# Patient Record
Sex: FEMALE | Race: WHITE | NOT HISPANIC OR LATINO | Employment: OTHER | URBAN - METROPOLITAN AREA
[De-identification: names, ages, dates, MRNs, and addresses within clinical notes are randomized per-mention and may not be internally consistent; named-entity substitution may affect disease eponyms.]

---

## 2017-02-21 ENCOUNTER — APPOINTMENT (EMERGENCY)
Dept: CT IMAGING | Facility: HOSPITAL | Age: 82
End: 2017-02-21
Payer: MEDICARE

## 2017-02-21 ENCOUNTER — APPOINTMENT (EMERGENCY)
Dept: RADIOLOGY | Facility: HOSPITAL | Age: 82
End: 2017-02-21
Payer: MEDICARE

## 2017-02-21 ENCOUNTER — HOSPITAL ENCOUNTER (EMERGENCY)
Facility: HOSPITAL | Age: 82
Discharge: HOME | End: 2017-02-21
Attending: EMERGENCY MEDICINE | Admitting: EMERGENCY MEDICINE
Payer: MEDICARE

## 2017-02-21 VITALS
WEIGHT: 135.14 LBS | HEIGHT: 64 IN | BODY MASS INDEX: 23.07 KG/M2 | HEART RATE: 89 BPM | TEMPERATURE: 98 F | SYSTOLIC BLOOD PRESSURE: 179 MMHG | OXYGEN SATURATION: 97 % | RESPIRATION RATE: 16 BRPM | DIASTOLIC BLOOD PRESSURE: 83 MMHG

## 2017-02-21 DIAGNOSIS — M16.12 ARTHRITIS OF LEFT HIP: ICD-10-CM
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
## 2017-02-21 DIAGNOSIS — S81.812A: ICD-10-CM

## 2017-02-21 DIAGNOSIS — S41.112A SKIN TEAR OF LEFT UPPER EXTREMITY: Primary | ICD-10-CM

## 2017-02-21 PROCEDURE — 73590 X-RAY EXAM OF LOWER LEG: CPT

## 2017-02-21 PROCEDURE — 90715 TDAP VACCINE 7 YRS/> IM: CPT | Performed by: EMERGENCY MEDICINE

## 2017-02-21 PROCEDURE — 73521 X-RAY EXAM HIPS BI 2 VIEWS: CPT

## 2017-02-21 PROCEDURE — 70450 CT HEAD/BRAIN W/O DYE: CPT

## 2017-02-21 PROCEDURE — 99284 EMERGENCY DEPT VISIT MOD MDM: CPT

## 2017-02-21 PROCEDURE — 90471 IMMUNIZATION ADMIN: CPT

## 2017-02-21 PROCEDURE — 73080 X-RAY EXAM OF ELBOW: CPT

## 2017-02-21 RX ORDER — ACETAMINOPHEN 325 MG/1
650 TABLET ORAL ONCE
Status: COMPLETED | OUTPATIENT
Start: 2017-02-21 | End: 2017-02-21

## 2017-02-21 RX ORDER — VERAPAMIL HYDROCHLORIDE 180 MG/1
180 CAPSULE, EXTENDED RELEASE ORAL DAILY
COMMUNITY
End: 2019-10-21 | Stop reason: SDUPTHER

## 2017-02-21 RX ORDER — LISINOPRIL 20 MG/1
20 TABLET ORAL 2 TIMES DAILY
COMMUNITY
End: 2019-08-13 | Stop reason: SDUPTHER

## 2017-02-21 RX ORDER — MULTIVIT-MIN/IRON/FOLIC ACID/K 18-600-40
2000 CAPSULE ORAL DAILY
COMMUNITY
End: 2018-04-26

## 2017-02-21 RX ORDER — FOLIC ACID 1 MG/1
1 TABLET ORAL DAILY
COMMUNITY
End: 2019-09-05 | Stop reason: SDUPTHER

## 2017-02-21 RX ORDER — AMLODIPINE BESYLATE 5 MG/1
5 TABLET ORAL DAILY
COMMUNITY
End: 2020-03-20 | Stop reason: SDUPTHER

## 2017-02-21 RX ORDER — CLOPIDOGREL BISULFATE 75 MG/1
75 TABLET ORAL DAILY
COMMUNITY
End: 2020-02-19 | Stop reason: SDUPTHER

## 2017-02-21 RX ADMIN — ACETAMINOPHEN 650 MG: 325 TABLET ORAL at 08:39

## 2017-02-21 RX ADMIN — Medication 1 APPLICATION: at 07:59

## 2017-02-21 RX ADMIN — TETANUS TOXOID, REDUCED DIPHTHERIA TOXOID AND ACELLULAR PERTUSSIS VACCINE, ADSORBED 0.5 ML: 5; 2.5; 8; 8; 2.5 SUSPENSION INTRAMUSCULAR at 08:46

## 2017-08-02 LAB — HBA1C MFR BLD HPLC: 5.8 %

## 2017-12-05 LAB — HBA1C MFR BLD HPLC: 5.6 %

## 2018-04-26 ENCOUNTER — OFFICE VISIT (OUTPATIENT)
Dept: URGENT CARE | Age: 83
End: 2018-04-26
Payer: MEDICARE

## 2018-04-26 VITALS
OXYGEN SATURATION: 97 % | DIASTOLIC BLOOD PRESSURE: 77 MMHG | HEART RATE: 99 BPM | SYSTOLIC BLOOD PRESSURE: 167 MMHG | BODY MASS INDEX: 24.73 KG/M2 | TEMPERATURE: 98.9 F | WEIGHT: 131 LBS | HEIGHT: 61 IN | RESPIRATION RATE: 20 BRPM
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
## 2018-04-26 DIAGNOSIS — S61.411A LACERATION OF RIGHT HAND WITH INFECTION, INITIAL ENCOUNTER: ICD-10-CM

## 2018-04-26 DIAGNOSIS — L08.9 LACERATION OF RIGHT HAND WITH INFECTION, INITIAL ENCOUNTER: ICD-10-CM

## 2018-04-26 PROCEDURE — G0463 HOSPITAL OUTPT CLINIC VISIT: HCPCS | Performed by: FAMILY MEDICINE

## 2018-04-26 PROCEDURE — 99213 OFFICE O/P EST LOW 20 MIN: CPT | Performed by: FAMILY MEDICINE

## 2018-04-26 RX ORDER — CEPHALEXIN 500 MG/1
500 CAPSULE ORAL EVERY 6 HOURS SCHEDULED
Qty: 28 CAPSULE | Refills: 0 | Status: SHIPPED | OUTPATIENT
Start: 2018-04-26 | End: 2018-05-03

## 2018-04-26 RX ORDER — GINSENG 100 MG
1 CAPSULE ORAL ONCE
Status: COMPLETED | OUTPATIENT
Start: 2018-04-26 | End: 2018-04-26

## 2018-04-26 RX ADMIN — Medication 1 SMALL APPLICATION: at 10:24

## 2018-04-26 NOTE — PROGRESS NOTES
[address] [facility]        NAME: Yarely Saucedo is a 80 y o  female  : 1918    MRN: [id number]  DATE: 2018  TIME: 8:33 AM    Assessment and Plan   Dog scratch [Q58  8XXA]  1  Dog scratch     2  Laceration of right hand with infection, initial encounter  cephalexin (KEFLEX) 500 mg capsule         Patient Instructions   Prescription sent to pharmacy for cephalexin antibiotic-use as directed  Apply bacitracin ointment to-3 times daily  Keep wound clean and dry  Closely monitor for signs of worsening infection  I educated the patient on the signs of sepsis  She is currently afebrile  May use Tylenol or ibuprofen for pain  Patient is up-to-date with her tetanus vaccination  The [address] [place] [address] bite/scratch report was completed  Follow up with PCP in 3-5 days  Proceed to  ER if symptoms worsen  Chief Complaint     Chief Complaint   Patient presents with    Wound Infection     scratched by dog on , rt hand,tetanus 2017         History of Present Illness   The patient is a 59-year-old female who presents with a right hand wound secondary to a dog scratch that occurred approximately 4 days ago  The patient's caregiver stated that she had a small skin tear in which they have been cleaning daily  The past 2 days they have noticed increased redness and swelling as well as pain  She has full range of motion of her right hand  Sensation is intact  Negative radiating pain  She denies any fever or chills  Good intake of water with good urine output  HPI    Review of Systems   Review of Systems   Constitutional: Negative for activity change, chills and fever  Respiratory: Negative for shortness of breath  Cardiovascular: Negative for chest pain  Musculoskeletal: Positive for arthralgias (Chronic arthritis)  Skin: Positive for color change and rash  Right hand wound           Current Medications       Current Outpatient Prescriptions:    amLODIPine (NORVASC) 5 mg tablet, Take 5 mg by mouth daily, Disp: , Rfl:     cephalexin (KEFLEX) 500 mg capsule, Take 1 capsule (500 mg total) by mouth every 6 (six) hours for 7 days, Disp: 28 capsule, Rfl: 0    clopidogrel (PLAVIX) 75 mg tablet, Take 75 mg by mouth daily, Disp: , Rfl:     folic acid (FOLVITE) 1 mg tablet, Take 1 mg by mouth daily, Disp: , Rfl:     lisinopril (ZESTRIL) 20 mg tablet, Take 20 mg by mouth 2 (two) times a day, Disp: , Rfl:     sitaGLIPtin-metFORMIN (JANUMET)  MG per tablet, Take 1 tablet by mouth 2 (two) times a day with meals, Disp: , Rfl:     verapamil (VERELAN PM) 180 MG 24 hr capsule, Take 180 mg by mouth daily, Disp: , Rfl:     Current Allergies     Allergies as of 04/26/2018    (No Known Allergies)            The following portions of the patient's history were reviewed and updated as appropriate: allergies, current medications, past family history, past medical history, past social history, past surgical history and problem list      Past Medical History:   Diagnosis Date    Diabetes mellitus ([facility] [address] )     Hypertension     Macular degeneration        History reviewed  No pertinent surgical history  No family history on file  Medications have been verified  Objective   /77   Pulse 99   Temp 98 9 °F (37 2 °C) (Temporal)   Resp 20   Ht 5' 1" (1 549 m)   Wt 59 4 kg (131 lb)   SpO2 97%   BMI 24 75 kg/m²        Physical Exam     Physical Exam   Constitutional: She appears well-developed and well-nourished  No distress  HENT:   Head: Normocephalic and atraumatic  Eyes: Conjunctivae and EOM are normal  Pupils are equal, round, and reactive to light  Right eye exhibits no discharge  Left eye exhibits no discharge  No scleral icterus  Musculoskeletal:        Right hand: She exhibits tenderness, laceration and swelling   She exhibits normal range of motion, no bony tenderness, normal two-point discrimination, normal capillary refill and no deformity  Normal sensation noted  Normal strength noted  She exhibits no finger abduction, no thumb/finger opposition and no wrist extension trouble  Hands:  Skin: She is not diaphoretic  Nursing note and vitals reviewed

## 2018-04-26 NOTE — PATIENT INSTRUCTIONS
Prescription sent to pharmacy for cephalexin antibiotic-use as directed  Apply bacitracin ointment to-3 times daily  Keep wound clean and dry  Closely monitor for signs of worsening infection  May use Tylenol or ibuprofen for pain  Patient is up-to-date with her tetanus vaccination  Follow up with PCP in 3-5 days  Proceed to  ER if symptoms worsen  Cellulitis   AMBULATORY CARE:   Cellulitis  is a skin infection caused by bacteria  Cellulitis usually appears on the legs and feet, arms and hands, or face  Common signs and symptoms include the following:   · A red, warm, swollen area on your skin    · Pain when the area is touched    · Bumps or blisters (abscess) that may drain pus    · Bumpy, raised skin that feels like an orange peel  Call 911 if:   · You have sudden trouble breathing or chest pain  Seek care immediately if:   · Your wound gets larger and more painful  · You feel a crackling under your skin when you touch it  · You have purple dots or bumps on your skin, or you see bleeding under your skin  · You have new swelling and pain in your legs  · The red, warm, swollen area gets larger  · You see red streaks coming from the infected area  Contact your healthcare provider if:   · You have a fever  · Your fever or pain does not go away or gets worse  · The area does not get smaller after 2 days of antibiotics  · Your skin is flaking or peeling off  · You have questions or concerns about your condition or care  Treatment for cellulitis  may decrease symptoms, stop the infection from spreading, and cure the infection  Treatment depends on how severe your cellulitis is  Cellulitis may go away on its own  You may  instead need antibiotics to help treat the bacterial infection and medicines for pain  Your healthcare provider may draw a [place] around the edges of your cellulitis   If your cellulitis spreads, your healthcare provider will see it outside of the [place]  Manage your symptoms:   · Elevate the area above the level of your heart  as often as you can  This will help decrease swelling and pain  Prop the area on pillows or blankets to keep it elevated comfortably  · Clean the area daily until the wound scabs over  Gently wash the area with soap and water  Pat dry  Use dressings as directed  · Place cool or warm, wet cloths on the area as directed  Use clean cloths and clean water  Leave it on the area until the cloth is room temperature  Pat the area dry with a clean, dry cloth  The cloths may help decrease pain  Prevent cellulitis:   · Do not scratch bug bites or areas of injury  You increase your risk for cellulitis by scratching these areas  · Do not share personal items, such as towels, clothing, and razors  · Clean exercise equipment  with germ-killing  before and after you use it  · Wash your hands often  Use soap and water  Wash your hands after you use the bathroom, change a child's diapers, or sneeze  Wash your hands before you prepare or eat food  Use lotion to prevent dry, cracked skin  · Wear pressure stockings as directed  You may be told to wear the stockings if you have peripheral edema  The stockings improve blood flow and decrease swelling  · Treat athlete's foot  This can help prevent the spread of a bacterial skin infection  Follow up with your healthcare provider within 3 days, or as directed: Your healthcare provider will check if your cellulitis is getting better  You may need different medicine  Write down your questions so you remember to ask them during your visits  © 2017 [address] [employer] Information is for End User's use only and may not be sold, redistributed or otherwise used for commercial purposes  All illustrations and images included in CareNotes® are the copyrighted property of [employer]  or Ashwin Perez  The above information is an  only  It is not intended as medical advice for individual conditions or treatments  Talk to your doctor, nurse or pharmacist before following any medical regimen to see if it is safe and effective for you

## 2018-06-12 LAB — HBA1C MFR BLD HPLC: 5.5 %

## 2018-12-18 LAB — HBA1C MFR BLD HPLC: 5.8 %

## 2019-08-13 ENCOUNTER — TELEPHONE (OUTPATIENT)
Dept: FAMILY MEDICINE CLINIC | Facility: CLINIC | Age: 84
End: 2019-08-13

## 2019-08-13 DIAGNOSIS — I10 ESSENTIAL HYPERTENSION: Primary | ICD-10-CM

## 2019-08-13 RX ORDER — LISINOPRIL 20 MG/1
20 TABLET ORAL 2 TIMES DAILY
Qty: 180 TABLET | Refills: 3 | Status: SHIPPED | OUTPATIENT
Start: 2019-08-13 | End: 2020-08-10

## 2019-08-13 NOTE — TELEPHONE ENCOUNTER
Call from Jori Velez (friend of yours) - she states you said you would start care from now on for her mom, house calls etc  They are requesting a med refill for patient

## 2019-08-16 ENCOUNTER — TELEPHONE (OUTPATIENT)
Dept: FAMILY MEDICINE CLINIC | Facility: CLINIC | Age: 84
End: 2019-08-16

## 2019-08-16 NOTE — TELEPHONE ENCOUNTER
Edwin Reyes is currently taking Tylenol 650 mg 3x daily for hip pain  Son also has her using a Lidocaine patch and CBD cream   He would like to know if there is anything else she can use for the pain  She can not  Have narcotics because she uses a walker to get around  Please advise

## 2019-08-27 NOTE — TELEPHONE ENCOUNTER
I spoke with patient's son in law and reviewed options for pain control  Family wants to avoid stronger pain medication as patient gets up in the middle the night to go to the bathroom and they are concerned about falling  Options would be to consider a trial of tramadol  She is currently on Plavix  This could be stopped and patient could be placed on low-dose aspirin to allow her to use p r n  NSAIDs such as Aleve

## 2019-09-05 DIAGNOSIS — E53.8 FOLIC ACID DEFICIENCY: Primary | ICD-10-CM

## 2019-09-05 RX ORDER — FOLIC ACID 1 MG/1
1 TABLET ORAL DAILY
Qty: 90 TABLET | Refills: 1 | Status: SHIPPED | OUTPATIENT
Start: 2019-09-05 | End: 2020-02-28 | Stop reason: SDUPTHER

## 2019-09-05 NOTE — TELEPHONE ENCOUNTER
Patient's daughter called stating she was a friend of the family and you would refill medications for patient  Patient has not been seen as patient at our office  Does patient need office visit? Jason Gifford listed as PCP  Please advise   She is requesting refill on Folic Acid 1 mg 90 day

## 2019-09-05 NOTE — TELEPHONE ENCOUNTER
Spoke with patient daughter, gave message  She said she was on a 4 month schedule with Dr Nicole Duenas so she is not due until October

## 2019-09-05 NOTE — TELEPHONE ENCOUNTER
I sent in script for Folic acid   I will call to arrange a home visit so she does not have to come to office

## 2019-10-07 NOTE — TELEPHONE ENCOUNTER
aFrhan Dennis called wanting to start scheduling patient for October visit or November  She also stated patient is in need of a Flu shot and an injection for her Hip Arthritis  Please advise  Farhan Dennis would like a return call [phone]  To let her know dates you have available to come out   [phone]

## 2019-10-21 ENCOUNTER — OFFICE VISIT (OUTPATIENT)
Dept: FAMILY MEDICINE CLINIC | Facility: CLINIC | Age: 84
End: 2019-10-21
Payer: MEDICARE

## 2019-10-21 DIAGNOSIS — R54 ADVANCED AGE: ICD-10-CM

## 2019-10-21 DIAGNOSIS — Z23 NEED FOR IMMUNIZATION AGAINST INFLUENZA: ICD-10-CM

## 2019-10-21 DIAGNOSIS — R26.2 AMBULATORY DYSFUNCTION: ICD-10-CM

## 2019-10-21 DIAGNOSIS — I10 ESSENTIAL HYPERTENSION: Primary | ICD-10-CM

## 2019-10-21 DIAGNOSIS — M16.12 PRIMARY OSTEOARTHRITIS OF LEFT HIP: ICD-10-CM

## 2019-10-21 DIAGNOSIS — M17.11 PRIMARY OSTEOARTHRITIS OF RIGHT KNEE: ICD-10-CM

## 2019-10-21 DIAGNOSIS — E11.9 TYPE 2 DIABETES MELLITUS WITHOUT COMPLICATION, WITHOUT LONG-TERM CURRENT USE OF INSULIN (HCC): Primary | ICD-10-CM

## 2019-10-21 DIAGNOSIS — M70.62 TROCHANTERIC BURSITIS OF LEFT HIP: ICD-10-CM

## 2019-10-21 DIAGNOSIS — I10 ESSENTIAL HYPERTENSION: ICD-10-CM

## 2019-10-21 DIAGNOSIS — I63.81 LEFT SIDED LACUNAR INFARCTION (HCC): ICD-10-CM

## 2019-10-21 PROCEDURE — 20610 DRAIN/INJ JOINT/BURSA W/O US: CPT | Performed by: FAMILY MEDICINE

## 2019-10-21 PROCEDURE — 90662 IIV NO PRSV INCREASED AG IM: CPT | Performed by: FAMILY MEDICINE

## 2019-10-21 PROCEDURE — G0008 ADMIN INFLUENZA VIRUS VAC: HCPCS | Performed by: FAMILY MEDICINE

## 2019-10-21 PROCEDURE — 99342 HOME/RES VST NEW LOW MDM 30: CPT | Performed by: FAMILY MEDICINE

## 2019-10-21 RX ORDER — VERAPAMIL HYDROCHLORIDE 180 MG/1
180 CAPSULE, EXTENDED RELEASE ORAL DAILY
Qty: 90 CAPSULE | Refills: 3 | Status: SHIPPED | OUTPATIENT
Start: 2019-10-21 | End: 2019-10-25 | Stop reason: SDUPTHER

## 2019-10-21 RX ADMIN — METHYLPREDNISOLONE ACETATE 80 MG: 80 INJECTION, SUSPENSION INTRA-ARTICULAR; INTRALESIONAL; INTRAMUSCULAR; SOFT TISSUE at 12:46

## 2019-10-21 NOTE — PROGRESS NOTES
Assessment/Plan:         Diagnoses and all orders for this visit:    Type 2 diabetes mellitus without complication, without long-term current use of insulin (HCC)  -     sitaGLIPtin-metFORMIN (JANUMET)  MG per tablet; Take 1 tablet by mouth daily after dinner    Essential hypertension    Left sided lacunar infarction [facility])    Primary osteoarthritis of left hip    Trochanteric bursitis of left hip  -     methylPREDNISolone acetate (DEPO-MEDROL) injection 80 mg  -     Large joint arthrocentesis: L greater trochanteric bursa    Primary osteoarthritis of right knee    Ambulatory dysfunction    Advanced age    Need for immunization against influenza  -     influenza vaccine, 3789-2271, high-dose, PF 0 5 mL (FLUZONE HIGH-DOSE)    Other orders  -     verapamil (CALAN-SR) 180 mg CR tablet         No changes in medications ( on 2 different Ca++ channel blockers)  No labs ordered at this time  Patient's family wants to avoid any sedating medications for pain such as Tramadol or opoids due to fall risk  I would try to avoid NSAIDs while on Plavix  She could consider x-ray guided left hip intra articular steroid injection although mobility/ transportation is an issue with patient  She has a component of left trochanteric bursitis  as a separate procedure today I performed a steroid injection of left lateral hip see procedure note  Flu vaccine today  Follow up visit in 4 months  Patient ID: Michelle Cummings is a 80 y o  female  Home visit  First office visit for this 8 year old female  Patient lives with daughter and son in law  Active problems and PMH see chart  Medications reviewed  Type 2 DM on Janumet 50/500 daily  12/2018 A1c 5 8 No reported hypoglycemic events  Last eye exam > 1 year ago  She is seen by podiatry at home  Hypertension on Lisinopril 20 mg BID, Amlodipine 5 mg daily and Verapamil  mg daily  12/2018 hemoglobin 11 6  Creatinine 0 70    Electrolytes normal   LFTs normal 08/2017 lipid profile cholesterol 151  Triglycerides 117  HDL 67  LDL 61  Hospitalizations/surgeries incarcerated right incisional hernia 2015 status post right total hip replacement 2006  Cholecystectomy 1975  The following portions of the patient's history were reviewed and updated as appropriate: allergies, current medications, past family history, past medical history, past social history, past surgical history and problem list     Review of Systems   Constitutional: Positive for fatigue  Negative for appetite change, chills, fever and unexpected weight change  HENT: Positive for hearing loss  Negative for congestion, ear pain, rhinorrhea, sore throat and trouble swallowing  Eyes: Positive for visual disturbance  History of ARMD   Respiratory: Negative for cough, shortness of breath and wheezing  Cardiovascular: Negative for chest pain, palpitations and leg swelling  History of PAF   Gastrointestinal: Negative for abdominal pain, blood in stool, constipation, diarrhea, nausea and vomiting  Remote history of UGI bleed? Genitourinary: Negative for difficulty urinating  +UI wears depends   Musculoskeletal: Positive for arthralgias, back pain and gait problem  Negative for myalgias  OA left hip  S/p right THR  on Tylenol Arthritis 650 mg 2 tablets three times a day  Ambulation limited  She can walk short distances with a walker  No recent falls  03/2004 x-rays of lumbar spine mild dextroscoliosis  Severe degenerative disc disease at L4-L5 and L5-S1  Skin: Negative for rash  Neurological: Negative for dizziness and headaches  Status post CVA 2010 left basal ganglia infarct with mild right facial weakness  04/2010 MRI brain focal early subacute infarct identified within the left basal ganglia and centrum semiovale with no mass effect or hemorrhagic transformation  Atrophy and chronic microvascular ischemic disease    04/2010 MRA neck mild smooth atherosclerotic disease of the proximal internal carotid artery bulb bilaterally right slightly greater than left  Mild atherosclerotic disease suspected within the distal right vertebral artery  04/2010 MRA brain intracranial atherosclerotic disease involving the M1 segments, middle cerebral artery branches and right posterior cerebral artery with no severe stenosis or occlusive disease   Hematological: Negative for adenopathy  Does not bruise/bleed easily  Psychiatric/Behavioral: Negative for dysphoric mood and sleep disturbance  Objective:      /60 (BP Location: Left arm, Patient Position: Sitting, Cuff Size: Standard)          Physical Exam   Constitutional: She is oriented to person, place, and time  No distress  HENT:   Mouth/Throat: Oropharynx is clear and moist and mucous membranes are normal  No oral lesions  Normal dentition  Hard of hearing    Eyes: Conjunctivae are normal  No scleral icterus  Neck: No JVD present  Carotid bruit is not present  No tracheal deviation present  No thyroid mass and no thyromegaly present  Cardiovascular: Normal rate and regular rhythm  Exam reveals no gallop  Murmur (1/6 systolic murmur at LSB) heard  Pulmonary/Chest: Effort normal and breath sounds normal  No respiratory distress  She has no wheezes  She has no rales  Abdominal: Soft  Bowel sounds are normal  She exhibits no distension, no abdominal bruit and no mass  There is no hepatosplenomegaly  There is no tenderness  There is no rebound and no guarding  Musculoskeletal: She exhibits no edema  Complete loss of range of motion of left hip with internal external rotation  Moderate tenderness over the left trochanteric bursa area  Range of motion of right hip normal   Valgus deformity of right knee  Patient lacks at least 5° of full extension of right knee  Right knee crepitus  No joint line tenderness or effusion  Lymphadenopathy:     She has no cervical adenopathy     Neurological: She is alert and oriented to person, place, and time  No cranial nerve deficit  Skin: No rash noted  No cyanosis  Nails show no clubbing  Psychiatric: She has a normal mood and affect  Her behavior is normal    Nursing note and vitals reviewed  Large joint arthrocentesis: L greater trochanteric bursa  Date/Time: 10/21/2019 7:15 PM  Consent given by: patient  Site marked: site marked  Supporting Documentation  Indications: pain (Left trochanteric bursitis)   Procedure Details  Location: hip - L greater trochanteric bursa  Preparation: Betadine  Needle size: 22 G  Ultrasound guidance: no  Approach: Direct      Patient tolerance: patient tolerated the procedure well with no immediate complications  Dressing:  Sterile dressing applied    Depo-Medrol 80 mg/mL and lidocaine 1% 4 mL

## 2019-10-22 VITALS — SYSTOLIC BLOOD PRESSURE: 140 MMHG | DIASTOLIC BLOOD PRESSURE: 60 MMHG

## 2019-10-22 PROBLEM — E11.9 TYPE 2 DIABETES MELLITUS WITHOUT COMPLICATION, WITHOUT LONG-TERM CURRENT USE OF INSULIN (HCC): Status: ACTIVE | Noted: 2019-10-22

## 2019-10-22 PROBLEM — M70.62 TROCHANTERIC BURSITIS, LEFT HIP: Status: ACTIVE | Noted: 2019-10-22

## 2019-10-22 PROBLEM — H35.30 ARMD (AGE RELATED MACULAR DEGENERATION): Status: ACTIVE | Noted: 2019-10-22

## 2019-10-22 PROBLEM — I10 ESSENTIAL HYPERTENSION: Status: ACTIVE | Noted: 2019-10-22

## 2019-10-22 PROBLEM — R26.2 AMBULATORY DYSFUNCTION: Status: ACTIVE | Noted: 2019-10-22

## 2019-10-22 PROBLEM — R54 ADVANCED AGE: Status: ACTIVE | Noted: 2019-10-22

## 2019-10-22 PROBLEM — M16.12 PRIMARY OSTEOARTHRITIS OF LEFT HIP: Status: ACTIVE | Noted: 2019-10-22

## 2019-10-22 PROBLEM — M17.11 PRIMARY OSTEOARTHRITIS OF RIGHT KNEE: Status: ACTIVE | Noted: 2019-10-22

## 2019-10-22 PROBLEM — I63.81 LEFT SIDED LACUNAR INFARCTION (HCC): Status: ACTIVE | Noted: 2019-10-22

## 2019-10-22 RX ORDER — METHYLPREDNISOLONE ACETATE 80 MG/ML
80 INJECTION, SUSPENSION INTRA-ARTICULAR; INTRALESIONAL; INTRAMUSCULAR; SOFT TISSUE ONCE
Status: COMPLETED | OUTPATIENT
Start: 2019-10-22 | End: 2019-10-21

## 2019-10-25 DIAGNOSIS — I10 ESSENTIAL HYPERTENSION: ICD-10-CM

## 2019-10-25 RX ORDER — VERAPAMIL HYDROCHLORIDE 180 MG/1
180 CAPSULE, EXTENDED RELEASE ORAL DAILY
Qty: 90 CAPSULE | Refills: 3 | Status: SHIPPED | OUTPATIENT
Start: 2019-10-25 | End: 2020-07-15 | Stop reason: SDUPTHER

## 2020-02-19 DIAGNOSIS — I63.81 LEFT SIDED LACUNAR INFARCTION (HCC): ICD-10-CM

## 2020-02-19 DIAGNOSIS — I10 ESSENTIAL HYPERTENSION: Primary | ICD-10-CM

## 2020-02-19 RX ORDER — CLOPIDOGREL BISULFATE 75 MG/1
TABLET ORAL
Qty: 90 TABLET | Refills: 3 | Status: SHIPPED | OUTPATIENT
Start: 2020-02-19 | End: 2020-10-16

## 2020-02-19 RX ORDER — CLOPIDOGREL BISULFATE 75 MG/1
75 TABLET ORAL DAILY
Qty: 30 TABLET | Refills: 5 | Status: SHIPPED | OUTPATIENT
Start: 2020-02-19 | End: 2020-02-19

## 2020-02-19 NOTE — TELEPHONE ENCOUNTER
Patient is requesting a refill of mediation that was prescribed by another provider      PLAVIX 75 mg  1 pill daily  #90 w refills    [address]

## 2020-02-28 DIAGNOSIS — E53.8 FOLIC ACID DEFICIENCY: ICD-10-CM

## 2020-02-28 RX ORDER — FOLIC ACID 1 MG/1
1 TABLET ORAL DAILY
Qty: 90 TABLET | Refills: 1 | Status: SHIPPED | OUTPATIENT
Start: 2020-02-28 | End: 2020-08-28

## 2020-02-28 NOTE — TELEPHONE ENCOUNTER
Patient's son in law requested refill on    Folic Acid 1 mg  1 pill daily  #90    [facility]

## 2020-03-20 DIAGNOSIS — E11.9 TYPE 2 DIABETES MELLITUS WITHOUT COMPLICATION, WITHOUT LONG-TERM CURRENT USE OF INSULIN (HCC): ICD-10-CM

## 2020-03-20 DIAGNOSIS — I10 ESSENTIAL HYPERTENSION: Primary | ICD-10-CM

## 2020-03-20 RX ORDER — AMLODIPINE BESYLATE 5 MG/1
5 TABLET ORAL DAILY
Qty: 90 TABLET | Refills: 3 | Status: SHIPPED | OUTPATIENT
Start: 2020-03-20 | End: 2020-10-16

## 2020-03-20 NOTE — TELEPHONE ENCOUNTER
Patient requesting refill on Janumet  mg one tab a day 90 day supply with refills and Amlodipine 5 mg 90 day supply with refills   [address] [facility]

## 2020-04-08 ENCOUNTER — TELEMEDICINE (OUTPATIENT)
Dept: FAMILY MEDICINE CLINIC | Facility: CLINIC | Age: 85
End: 2020-04-08
Payer: MEDICARE

## 2020-04-08 DIAGNOSIS — M16.12 PRIMARY OSTEOARTHRITIS OF LEFT HIP: ICD-10-CM

## 2020-04-08 DIAGNOSIS — R26.2 AMBULATORY DYSFUNCTION: ICD-10-CM

## 2020-04-08 DIAGNOSIS — E11.9 TYPE 2 DIABETES MELLITUS WITHOUT COMPLICATION, WITHOUT LONG-TERM CURRENT USE OF INSULIN (HCC): Primary | ICD-10-CM

## 2020-04-08 DIAGNOSIS — M70.62 TROCHANTERIC BURSITIS, LEFT HIP: ICD-10-CM

## 2020-04-08 DIAGNOSIS — R54 ADVANCED AGE: ICD-10-CM

## 2020-04-08 DIAGNOSIS — I10 ESSENTIAL HYPERTENSION: ICD-10-CM

## 2020-04-08 DIAGNOSIS — I63.81 LEFT SIDED LACUNAR INFARCTION (HCC): ICD-10-CM

## 2020-04-08 DIAGNOSIS — M17.11 PRIMARY OSTEOARTHRITIS OF RIGHT KNEE: ICD-10-CM

## 2020-04-08 PROCEDURE — 99214 OFFICE O/P EST MOD 30 MIN: CPT | Performed by: FAMILY MEDICINE

## 2020-05-15 ENCOUNTER — TELEPHONE (OUTPATIENT)
Dept: FAMILY MEDICINE CLINIC | Facility: CLINIC | Age: 85
End: 2020-05-15

## 2020-05-15 DIAGNOSIS — L30.9 PERIANAL DERMATITIS: Primary | ICD-10-CM

## 2020-05-15 RX ORDER — NYSTATIN 100000 U/G
CREAM TOPICAL 2 TIMES DAILY
Qty: 30 G | Refills: 1 | Status: SHIPPED | OUTPATIENT
Start: 2020-05-15 | End: 2022-07-09

## 2020-05-18 ENCOUNTER — TELEPHONE (OUTPATIENT)
Dept: FAMILY MEDICINE CLINIC | Facility: CLINIC | Age: 85
End: 2020-05-18

## 2020-07-15 DIAGNOSIS — I10 ESSENTIAL HYPERTENSION: ICD-10-CM

## 2020-07-15 RX ORDER — VERAPAMIL HYDROCHLORIDE 180 MG/1
180 CAPSULE, EXTENDED RELEASE ORAL DAILY
Qty: 90 CAPSULE | Refills: 3 | Status: SHIPPED | OUTPATIENT
Start: 2020-07-15 | End: 2020-10-16

## 2020-08-08 DIAGNOSIS — I10 ESSENTIAL HYPERTENSION: ICD-10-CM

## 2020-08-10 RX ORDER — LISINOPRIL 20 MG/1
TABLET ORAL
Qty: 180 TABLET | Refills: 3 | Status: SHIPPED | OUTPATIENT
Start: 2020-08-10 | End: 2021-07-27

## 2020-08-28 DIAGNOSIS — E53.8 FOLIC ACID DEFICIENCY: ICD-10-CM

## 2020-08-28 RX ORDER — FOLIC ACID 1 MG/1
TABLET ORAL
Qty: 90 TABLET | Refills: 1 | Status: SHIPPED | OUTPATIENT
Start: 2020-08-28 | End: 2020-10-16

## 2020-09-14 ENCOUNTER — TELEPHONE (OUTPATIENT)
Dept: FAMILY MEDICINE CLINIC | Facility: CLINIC | Age: 85
End: 2020-09-14

## 2020-10-15 ENCOUNTER — OFFICE VISIT (OUTPATIENT)
Dept: FAMILY MEDICINE CLINIC | Facility: CLINIC | Age: 85
End: 2020-10-15
Payer: MEDICARE

## 2020-10-15 DIAGNOSIS — R54 ADVANCED AGE: ICD-10-CM

## 2020-10-15 DIAGNOSIS — I63.81 LEFT SIDED LACUNAR INFARCTION (HCC): ICD-10-CM

## 2020-10-15 DIAGNOSIS — M16.12 PRIMARY OSTEOARTHRITIS OF LEFT HIP: ICD-10-CM

## 2020-10-15 DIAGNOSIS — E11.9 TYPE 2 DIABETES MELLITUS WITHOUT COMPLICATION, WITHOUT LONG-TERM CURRENT USE OF INSULIN (HCC): Primary | ICD-10-CM

## 2020-10-15 DIAGNOSIS — I10 ESSENTIAL HYPERTENSION: ICD-10-CM

## 2020-10-15 DIAGNOSIS — M17.11 PRIMARY OSTEOARTHRITIS OF RIGHT KNEE: ICD-10-CM

## 2020-10-15 DIAGNOSIS — I48.0 PAROXYSMAL ATRIAL FIBRILLATION (HCC): ICD-10-CM

## 2020-10-15 DIAGNOSIS — R26.2 AMBULATORY DYSFUNCTION: ICD-10-CM

## 2020-10-15 DIAGNOSIS — M70.62 TROCHANTERIC BURSITIS, LEFT HIP: ICD-10-CM

## 2020-10-15 DIAGNOSIS — Z23 NEED FOR IMMUNIZATION AGAINST INFLUENZA: ICD-10-CM

## 2020-10-15 PROCEDURE — 99348 HOME/RES VST EST LOW MDM 30: CPT | Performed by: FAMILY MEDICINE

## 2020-10-15 PROCEDURE — 20610 DRAIN/INJ JOINT/BURSA W/O US: CPT | Performed by: FAMILY MEDICINE

## 2020-10-16 ENCOUNTER — TELEPHONE (OUTPATIENT)
Dept: FAMILY MEDICINE CLINIC | Facility: CLINIC | Age: 85
End: 2020-10-16

## 2020-10-16 VITALS — DIASTOLIC BLOOD PRESSURE: 80 MMHG | SYSTOLIC BLOOD PRESSURE: 140 MMHG | HEART RATE: 72 BPM | RESPIRATION RATE: 16 BRPM

## 2020-10-16 PROBLEM — I48.0 PAROXYSMAL ATRIAL FIBRILLATION (HCC): Status: ACTIVE | Noted: 2020-10-16

## 2020-10-16 PROCEDURE — G0008 ADMIN INFLUENZA VIRUS VAC: HCPCS | Performed by: FAMILY MEDICINE

## 2020-10-16 PROCEDURE — 90662 IIV NO PRSV INCREASED AG IM: CPT | Performed by: FAMILY MEDICINE

## 2020-10-16 RX ORDER — METHYLPREDNISOLONE ACETATE 80 MG/ML
80 INJECTION, SUSPENSION INTRA-ARTICULAR; INTRALESIONAL; INTRAMUSCULAR; SOFT TISSUE ONCE
Status: COMPLETED | OUTPATIENT
Start: 2020-10-16 | End: 2020-10-16

## 2020-10-16 RX ORDER — VERAPAMIL HYDROCHLORIDE 180 MG/1
180 CAPSULE, EXTENDED RELEASE ORAL DAILY
COMMUNITY
Start: 2019-04-22 | End: 2020-10-22 | Stop reason: SDUPTHER

## 2020-10-16 RX ORDER — SITAGLIPTIN AND METFORMIN HYDROCHLORIDE 500; 50 MG/1; MG/1
TABLET, FILM COATED ORAL DAILY
COMMUNITY
Start: 2019-04-09 | End: 2020-10-16

## 2020-10-16 RX ORDER — AMLODIPINE BESYLATE 5 MG/1
TABLET ORAL DAILY
COMMUNITY
Start: 2019-04-22 | End: 2020-10-22 | Stop reason: SDUPTHER

## 2020-10-16 RX ORDER — LISINOPRIL 20 MG/1
TABLET ORAL EVERY 12 HOURS
COMMUNITY
Start: 2018-11-20 | End: 2020-10-16

## 2020-10-16 RX ADMIN — METHYLPREDNISOLONE ACETATE 80 MG: 80 INJECTION, SUSPENSION INTRA-ARTICULAR; INTRALESIONAL; INTRAMUSCULAR; SOFT TISSUE at 15:06

## 2020-10-20 ENCOUNTER — TELEPHONE (OUTPATIENT)
Dept: FAMILY MEDICINE CLINIC | Facility: CLINIC | Age: 85
End: 2020-10-20

## 2020-10-22 ENCOUNTER — TELEPHONE (OUTPATIENT)
Dept: FAMILY MEDICINE CLINIC | Facility: CLINIC | Age: 85
End: 2020-10-22

## 2020-10-22 DIAGNOSIS — I10 ESSENTIAL HYPERTENSION: Primary | ICD-10-CM

## 2020-10-22 RX ORDER — VERAPAMIL HYDROCHLORIDE 180 MG/1
180 CAPSULE, EXTENDED RELEASE ORAL DAILY
Qty: 90 CAPSULE | Refills: 3 | Status: SHIPPED | OUTPATIENT
Start: 2020-10-22 | End: 2021-10-11

## 2020-10-22 RX ORDER — AMLODIPINE BESYLATE 5 MG/1
5 TABLET ORAL DAILY
Qty: 90 TABLET | Refills: 3 | Status: SHIPPED | OUTPATIENT
Start: 2020-10-22 | End: 2021-10-11

## 2020-11-24 ENCOUNTER — TELEPHONE (OUTPATIENT)
Dept: FAMILY MEDICINE CLINIC | Facility: CLINIC | Age: 85
End: 2020-11-24

## 2021-01-14 ENCOUNTER — TELEPHONE (OUTPATIENT)
Dept: FAMILY MEDICINE CLINIC | Facility: CLINIC | Age: 86
End: 2021-01-14

## 2021-01-14 NOTE — TELEPHONE ENCOUNTER
Patient son-in-law Sarah Enrique called looking for advice on how to treat patient's bedsore on her left calf  No redness around sore or fever  They have been applying neosporin for the last 2 weeks but it has not gone away  Call Matthew Cornell with advice for treatment

## 2021-01-15 DIAGNOSIS — L89.892: Primary | ICD-10-CM

## 2021-01-15 RX ORDER — OSTOMY SUPPLY 1"
EACH MISCELLANEOUS
Qty: 10 EACH | Refills: 2 | Status: SHIPPED | OUTPATIENT
Start: 2021-01-15 | End: 2022-07-09

## 2021-01-15 NOTE — TELEPHONE ENCOUNTER
I called and spoke with patient's son-in-law  Patient has developed a small open area left lateral calf no trauma or injury she sleeps on her left side likely a pressure area family has been cleaning area and applying Neosporin daily no fevers or chills  Minimal drainage  Plan thin DuoDerm applied to affected area change every 3 days and as needed  Monitor for any changes    Advised to call if any worsening

## 2021-01-15 NOTE — TELEPHONE ENCOUNTER
Son-in-law, Josh Mendez, phoned asking for call to be returned to him instead of Andreia Kurtz   His phone # is [phone]

## 2021-03-24 ENCOUNTER — TELEPHONE (OUTPATIENT)
Dept: FAMILY MEDICINE CLINIC | Facility: CLINIC | Age: 86
End: 2021-03-24

## 2021-03-24 NOTE — TELEPHONE ENCOUNTER
Spoke to Patient's son  We will call if we have any updates   As of right now there is no update on home bound vaccines

## 2021-03-24 NOTE — TELEPHONE ENCOUNTER
Roman Francois called asking about a Covid vaccine for patient  He would like to know if there are any updates on vaccines for homebound patients  Can patient be given a script to give to [facility]? Please advise/ Roman Francois would like a return call    [phone]

## 2021-03-24 NOTE — TELEPHONE ENCOUNTER
Please advise  Also, maybe clerical can check with teams Covid scheduling group to see if there are any options for homebound patients

## 2021-04-07 DIAGNOSIS — E11.9 TYPE 2 DIABETES MELLITUS WITHOUT COMPLICATION, WITHOUT LONG-TERM CURRENT USE OF INSULIN (HCC): ICD-10-CM

## 2021-07-27 DIAGNOSIS — I10 ESSENTIAL HYPERTENSION: ICD-10-CM

## 2021-07-27 RX ORDER — LISINOPRIL 20 MG/1
TABLET ORAL
Qty: 180 TABLET | Refills: 3 | Status: SHIPPED | OUTPATIENT
Start: 2021-07-27

## 2021-10-11 DIAGNOSIS — I10 ESSENTIAL HYPERTENSION: ICD-10-CM

## 2021-10-11 RX ORDER — AMLODIPINE BESYLATE 5 MG/1
TABLET ORAL
Qty: 90 TABLET | Refills: 3 | Status: SHIPPED | OUTPATIENT
Start: 2021-10-11

## 2021-10-11 RX ORDER — VERAPAMIL HYDROCHLORIDE 180 MG/1
CAPSULE, EXTENDED RELEASE ORAL
Qty: 90 CAPSULE | Refills: 3 | Status: SHIPPED | OUTPATIENT
Start: 2021-10-11

## 2021-10-25 ENCOUNTER — OFFICE VISIT (OUTPATIENT)
Dept: FAMILY MEDICINE CLINIC | Facility: CLINIC | Age: 86
End: 2021-10-25
Payer: MEDICARE

## 2021-10-25 DIAGNOSIS — R26.2 AMBULATORY DYSFUNCTION: ICD-10-CM

## 2021-10-25 DIAGNOSIS — I48.0 PAROXYSMAL ATRIAL FIBRILLATION (HCC): ICD-10-CM

## 2021-10-25 DIAGNOSIS — E11.9 TYPE 2 DIABETES MELLITUS WITHOUT COMPLICATION, WITHOUT LONG-TERM CURRENT USE OF INSULIN (HCC): Primary | ICD-10-CM

## 2021-10-25 DIAGNOSIS — M16.12 PRIMARY OSTEOARTHRITIS OF LEFT HIP: ICD-10-CM

## 2021-10-25 DIAGNOSIS — I63.81 LEFT SIDED LACUNAR INFARCTION (HCC): ICD-10-CM

## 2021-10-25 DIAGNOSIS — R54 ADVANCED AGE: ICD-10-CM

## 2021-10-25 DIAGNOSIS — M17.11 PRIMARY OSTEOARTHRITIS OF RIGHT KNEE: ICD-10-CM

## 2021-10-25 DIAGNOSIS — I10 ESSENTIAL HYPERTENSION: ICD-10-CM

## 2021-10-25 PROCEDURE — 99349 HOME/RES VST EST MOD MDM 40: CPT | Performed by: FAMILY MEDICINE

## 2021-10-26 VITALS — DIASTOLIC BLOOD PRESSURE: 80 MMHG | SYSTOLIC BLOOD PRESSURE: 128 MMHG | HEART RATE: 76 BPM | RESPIRATION RATE: 16 BRPM

## 2021-10-26 PROBLEM — M70.62 TROCHANTERIC BURSITIS, LEFT HIP: Status: RESOLVED | Noted: 2019-10-22 | Resolved: 2021-10-26

## 2022-04-04 DIAGNOSIS — E11.9 TYPE 2 DIABETES MELLITUS WITHOUT COMPLICATION, WITHOUT LONG-TERM CURRENT USE OF INSULIN (HCC): ICD-10-CM

## 2022-04-21 ENCOUNTER — HOSPITAL ENCOUNTER (INPATIENT)
Facility: HOSPITAL | Age: 87
LOS: 6 days | Discharge: NURSING, REHAB OR LONG-TERM CARE | DRG: 872 | End: 2022-04-27
Attending: EMERGENCY MEDICINE | Admitting: INTERNAL MEDICINE
Payer: MEDICARE

## 2022-04-21 ENCOUNTER — APPOINTMENT (EMERGENCY)
Dept: CT IMAGING | Facility: HOSPITAL | Age: 87
DRG: 872 | End: 2022-04-21
Payer: MEDICARE

## 2022-04-21 ENCOUNTER — APPOINTMENT (EMERGENCY)
Dept: RADIOLOGY | Facility: HOSPITAL | Age: 87
DRG: 872 | End: 2022-04-21
Payer: MEDICARE

## 2022-04-21 DIAGNOSIS — R79.89 ELEVATED LFTS: ICD-10-CM

## 2022-04-21 DIAGNOSIS — A41.9 SEPSIS (HCC): Primary | ICD-10-CM

## 2022-04-21 DIAGNOSIS — T39.1X1A TOXIC EFFECT OF ACETAMINOPHEN, ACCIDENTAL OR UNINTENTIONAL, INITIAL ENCOUNTER: ICD-10-CM

## 2022-04-21 DIAGNOSIS — I42.9 CARDIOMYOPATHY, UNSPECIFIED TYPE (HCC): ICD-10-CM

## 2022-04-21 DIAGNOSIS — R78.81 BACTEREMIA: ICD-10-CM

## 2022-04-21 DIAGNOSIS — R74.01 TRANSAMINITIS: ICD-10-CM

## 2022-04-21 LAB
2HR DELTA HS TROPONIN: 8 NG/L
ALBUMIN SERPL BCP-MCNC: 3 G/DL (ref 3.5–5)
ALP SERPL-CCNC: 666 U/L (ref 46–116)
ALT SERPL W P-5'-P-CCNC: 1507 U/L (ref 12–78)
ANION GAP SERPL CALCULATED.3IONS-SCNC: 14 MMOL/L (ref 4–13)
APAP SERPL-MCNC: 15.8 UG/ML (ref 10–20)
APTT PPP: 24 SECONDS (ref 23–37)
AST SERPL W P-5'-P-CCNC: 2363 U/L (ref 5–45)
BASOPHILS # BLD AUTO: 0.02 THOUSANDS/ΜL (ref 0–0.1)
BASOPHILS NFR BLD AUTO: 0 % (ref 0–1)
BILIRUB SERPL-MCNC: 1.01 MG/DL (ref 0.2–1)
BUN SERPL-MCNC: 26 MG/DL (ref 5–25)
CALCIUM ALBUM COR SERPL-MCNC: 9.3 MG/DL (ref 8.3–10.1)
CALCIUM SERPL-MCNC: 8.5 MG/DL (ref 8.3–10.1)
CARDIAC TROPONIN I PNL SERPL HS: 19 NG/L
CARDIAC TROPONIN I PNL SERPL HS: 27 NG/L
CHLORIDE SERPL-SCNC: 104 MMOL/L (ref 100–108)
CK SERPL-CCNC: 82 U/L (ref 26–192)
CO2 SERPL-SCNC: 22 MMOL/L (ref 21–32)
CREAT SERPL-MCNC: 0.81 MG/DL (ref 0.6–1.3)
EOSINOPHIL # BLD AUTO: 0 THOUSAND/ΜL (ref 0–0.61)
EOSINOPHIL NFR BLD AUTO: 0 % (ref 0–6)
ERYTHROCYTE [DISTWIDTH] IN BLOOD BY AUTOMATED COUNT: 13.3 % (ref 11.6–15.1)
ETHANOL SERPL-MCNC: <3 MG/DL (ref 0–3)
FLUAV RNA RESP QL NAA+PROBE: NEGATIVE
FLUBV RNA RESP QL NAA+PROBE: NEGATIVE
GFR SERPL CREATININE-BSD FRML MDRD: 58 ML/MIN/1.73SQ M
GLUCOSE SERPL-MCNC: 136 MG/DL (ref 65–140)
HCT VFR BLD AUTO: 34.8 % (ref 34.8–46.1)
HGB BLD-MCNC: 11 G/DL (ref 11.5–15.4)
IMM GRANULOCYTES # BLD AUTO: 0.04 THOUSAND/UL (ref 0–0.2)
IMM GRANULOCYTES NFR BLD AUTO: 0 % (ref 0–2)
INR PPP: 1.09 (ref 0.84–1.19)
LACTATE SERPL-SCNC: 3.3 MMOL/L (ref 0.5–2)
LACTATE SERPL-SCNC: 5.6 MMOL/L (ref 0.5–2)
LYMPHOCYTES # BLD AUTO: 0.18 THOUSANDS/ΜL (ref 0.6–4.47)
LYMPHOCYTES NFR BLD AUTO: 2 % (ref 14–44)
MCH RBC QN AUTO: 30.7 PG (ref 26.8–34.3)
MCHC RBC AUTO-ENTMCNC: 31.6 G/DL (ref 31.4–37.4)
MCV RBC AUTO: 97 FL (ref 82–98)
MONOCYTES # BLD AUTO: 0.54 THOUSAND/ΜL (ref 0.17–1.22)
MONOCYTES NFR BLD AUTO: 5 % (ref 4–12)
NEUTROPHILS # BLD AUTO: 10.28 THOUSANDS/ΜL (ref 1.85–7.62)
NEUTS SEG NFR BLD AUTO: 93 % (ref 43–75)
NRBC BLD AUTO-RTO: 0 /100 WBCS
PLATELET # BLD AUTO: 379 THOUSANDS/UL (ref 149–390)
PMV BLD AUTO: 9.1 FL (ref 8.9–12.7)
POTASSIUM SERPL-SCNC: 3.8 MMOL/L (ref 3.5–5.3)
PROT SERPL-MCNC: 6.7 G/DL (ref 6.4–8.2)
PROTHROMBIN TIME: 14.1 SECONDS (ref 11.6–14.5)
RBC # BLD AUTO: 3.58 MILLION/UL (ref 3.81–5.12)
RSV RNA RESP QL NAA+PROBE: NEGATIVE
SALICYLATES SERPL-MCNC: <3 MG/DL (ref 3–20)
SARS-COV-2 RNA RESP QL NAA+PROBE: NEGATIVE
SODIUM SERPL-SCNC: 140 MMOL/L (ref 136–145)
WBC # BLD AUTO: 11.06 THOUSAND/UL (ref 4.31–10.16)

## 2022-04-21 PROCEDURE — 82550 ASSAY OF CK (CPK): CPT | Performed by: EMERGENCY MEDICINE

## 2022-04-21 PROCEDURE — 87040 BLOOD CULTURE FOR BACTERIA: CPT | Performed by: EMERGENCY MEDICINE

## 2022-04-21 PROCEDURE — 87186 SC STD MICRODIL/AGAR DIL: CPT | Performed by: EMERGENCY MEDICINE

## 2022-04-21 PROCEDURE — 82077 ASSAY SPEC XCP UR&BREATH IA: CPT | Performed by: EMERGENCY MEDICINE

## 2022-04-21 PROCEDURE — 83036 HEMOGLOBIN GLYCOSYLATED A1C: CPT

## 2022-04-21 PROCEDURE — 85730 THROMBOPLASTIN TIME PARTIAL: CPT | Performed by: EMERGENCY MEDICINE

## 2022-04-21 PROCEDURE — 93005 ELECTROCARDIOGRAM TRACING: CPT

## 2022-04-21 PROCEDURE — 87154 CUL TYP ID BLD PTHGN 6+ TRGT: CPT | Performed by: EMERGENCY MEDICINE

## 2022-04-21 PROCEDURE — 85025 COMPLETE CBC W/AUTO DIFF WBC: CPT | Performed by: EMERGENCY MEDICINE

## 2022-04-21 PROCEDURE — G1004 CDSM NDSC: HCPCS

## 2022-04-21 PROCEDURE — 80143 DRUG ASSAY ACETAMINOPHEN: CPT | Performed by: EMERGENCY MEDICINE

## 2022-04-21 PROCEDURE — 96365 THER/PROPH/DIAG IV INF INIT: CPT

## 2022-04-21 PROCEDURE — 0241U HB NFCT DS VIR RESP RNA 4 TRGT: CPT | Performed by: EMERGENCY MEDICINE

## 2022-04-21 PROCEDURE — 96361 HYDRATE IV INFUSION ADD-ON: CPT

## 2022-04-21 PROCEDURE — 84484 ASSAY OF TROPONIN QUANT: CPT | Performed by: EMERGENCY MEDICINE

## 2022-04-21 PROCEDURE — 74177 CT ABD & PELVIS W/CONTRAST: CPT

## 2022-04-21 PROCEDURE — 36415 COLL VENOUS BLD VENIPUNCTURE: CPT

## 2022-04-21 PROCEDURE — 80179 DRUG ASSAY SALICYLATE: CPT | Performed by: EMERGENCY MEDICINE

## 2022-04-21 PROCEDURE — 85610 PROTHROMBIN TIME: CPT | Performed by: EMERGENCY MEDICINE

## 2022-04-21 PROCEDURE — 80053 COMPREHEN METABOLIC PANEL: CPT | Performed by: EMERGENCY MEDICINE

## 2022-04-21 PROCEDURE — 82248 BILIRUBIN DIRECT: CPT

## 2022-04-21 PROCEDURE — 99285 EMERGENCY DEPT VISIT HI MDM: CPT

## 2022-04-21 PROCEDURE — 83605 ASSAY OF LACTIC ACID: CPT | Performed by: EMERGENCY MEDICINE

## 2022-04-21 PROCEDURE — 87077 CULTURE AEROBIC IDENTIFY: CPT | Performed by: EMERGENCY MEDICINE

## 2022-04-21 PROCEDURE — 71045 X-RAY EXAM CHEST 1 VIEW: CPT

## 2022-04-21 PROCEDURE — 99285 EMERGENCY DEPT VISIT HI MDM: CPT | Performed by: EMERGENCY MEDICINE

## 2022-04-21 RX ORDER — ACETAMINOPHEN 160 MG/5ML
1 SUSPENSION, ORAL (FINAL DOSE FORM) ORAL ONCE
Status: DISCONTINUED | OUTPATIENT
Start: 2022-04-21 | End: 2022-04-21

## 2022-04-21 RX ORDER — ACETAMINOPHEN 160 MG/5ML
1 SUSPENSION, ORAL (FINAL DOSE FORM) ORAL ONCE
Status: COMPLETED | OUTPATIENT
Start: 2022-04-21 | End: 2022-04-21

## 2022-04-21 RX ADMIN — CEFTRIAXONE 1000 MG: 1 INJECTION, POWDER, FOR SOLUTION INTRAMUSCULAR; INTRAVENOUS at 20:26

## 2022-04-21 RX ADMIN — IOHEXOL 100 ML: 350 INJECTION, SOLUTION INTRAVENOUS at 21:19

## 2022-04-21 RX ADMIN — SODIUM CHLORIDE 1500 ML: 0.9 INJECTION, SOLUTION INTRAVENOUS at 21:52

## 2022-04-22 ENCOUNTER — APPOINTMENT (INPATIENT)
Dept: ULTRASOUND IMAGING | Facility: HOSPITAL | Age: 87
DRG: 872 | End: 2022-04-22
Payer: MEDICARE

## 2022-04-22 PROBLEM — T39.1X1A ACETAMINOPHEN TOXICITY: Status: ACTIVE | Noted: 2022-04-22

## 2022-04-22 PROBLEM — R78.81 BACTEREMIA: Status: ACTIVE | Noted: 2022-04-22

## 2022-04-22 PROBLEM — T39.1X1A ACETAMINOPHEN TOXICITY: Status: RESOLVED | Noted: 2022-04-22 | Resolved: 2022-04-22

## 2022-04-22 PROBLEM — R74.01 TRANSAMINITIS: Status: ACTIVE | Noted: 2022-04-22

## 2022-04-22 LAB
4HR DELTA HS TROPONIN: 6 NG/L
ALBUMIN SERPL BCP-MCNC: 2.5 G/DL (ref 3.5–5)
ALBUMIN SERPL BCP-MCNC: 2.7 G/DL (ref 3.5–5)
ALP SERPL-CCNC: 439 U/L (ref 46–116)
ALP SERPL-CCNC: 517 U/L (ref 46–116)
ALT SERPL W P-5'-P-CCNC: 1126 U/L (ref 12–78)
ALT SERPL W P-5'-P-CCNC: 1516 U/L (ref 12–78)
ANION GAP SERPL CALCULATED.3IONS-SCNC: 12 MMOL/L (ref 4–13)
APAP SERPL-MCNC: <2 UG/ML (ref 10–20)
AST SERPL W P-5'-P-CCNC: 1410 U/L (ref 5–45)
AST SERPL W P-5'-P-CCNC: 717 U/L (ref 5–45)
BACTERIA UR QL AUTO: NORMAL /HPF
BASOPHILS # BLD AUTO: 0.03 THOUSANDS/ΜL (ref 0–0.1)
BASOPHILS NFR BLD AUTO: 0 % (ref 0–1)
BILIRUB DIRECT SERPL-MCNC: 0.13 MG/DL (ref 0–0.2)
BILIRUB DIRECT SERPL-MCNC: 0.14 MG/DL (ref 0–0.2)
BILIRUB DIRECT SERPL-MCNC: 0.43 MG/DL (ref 0–0.2)
BILIRUB SERPL-MCNC: 0.29 MG/DL (ref 0.2–1)
BILIRUB SERPL-MCNC: 0.37 MG/DL (ref 0.2–1)
BILIRUB UR QL STRIP: NEGATIVE
BUN SERPL-MCNC: 16 MG/DL (ref 5–25)
CALCIUM ALBUM COR SERPL-MCNC: 9.1 MG/DL (ref 8.3–10.1)
CALCIUM SERPL-MCNC: 8.1 MG/DL (ref 8.3–10.1)
CARDIAC TROPONIN I PNL SERPL HS: 25 NG/L
CHLORIDE SERPL-SCNC: 108 MMOL/L (ref 100–108)
CLARITY UR: CLEAR
CO2 SERPL-SCNC: 24 MMOL/L (ref 21–32)
COLOR UR: YELLOW
CREAT SERPL-MCNC: 0.57 MG/DL (ref 0.6–1.3)
EOSINOPHIL # BLD AUTO: 0 THOUSAND/ΜL (ref 0–0.61)
EOSINOPHIL NFR BLD AUTO: 0 % (ref 0–6)
ERYTHROCYTE [DISTWIDTH] IN BLOOD BY AUTOMATED COUNT: 13.7 % (ref 11.6–15.1)
EST. AVERAGE GLUCOSE BLD GHB EST-MCNC: 111 MG/DL
GFR SERPL CREATININE-BSD FRML MDRD: 74 ML/MIN/1.73SQ M
GLUCOSE SERPL-MCNC: 125 MG/DL (ref 65–140)
GLUCOSE SERPL-MCNC: 130 MG/DL (ref 65–140)
GLUCOSE SERPL-MCNC: 131 MG/DL (ref 65–140)
GLUCOSE SERPL-MCNC: 131 MG/DL (ref 65–140)
GLUCOSE SERPL-MCNC: 98 MG/DL (ref 65–140)
GLUCOSE UR STRIP-MCNC: NEGATIVE MG/DL
HBA1C MFR BLD: 5.5 %
HCT VFR BLD AUTO: 32.5 % (ref 34.8–46.1)
HGB BLD-MCNC: 10.2 G/DL (ref 11.5–15.4)
HGB UR QL STRIP.AUTO: ABNORMAL
IMM GRANULOCYTES # BLD AUTO: 0.1 THOUSAND/UL (ref 0–0.2)
IMM GRANULOCYTES NFR BLD AUTO: 1 % (ref 0–2)
INR PPP: 1.27 (ref 0.84–1.19)
KETONES UR STRIP-MCNC: ABNORMAL MG/DL
LACTATE SERPL-SCNC: 1.3 MMOL/L (ref 0.5–2)
LEUKOCYTE ESTERASE UR QL STRIP: NEGATIVE
LYMPHOCYTES # BLD AUTO: 0.72 THOUSANDS/ΜL (ref 0.6–4.47)
LYMPHOCYTES NFR BLD AUTO: 5 % (ref 14–44)
MCH RBC QN AUTO: 30.7 PG (ref 26.8–34.3)
MCHC RBC AUTO-ENTMCNC: 31.4 G/DL (ref 31.4–37.4)
MCV RBC AUTO: 98 FL (ref 82–98)
MONOCYTES # BLD AUTO: 1.01 THOUSAND/ΜL (ref 0.17–1.22)
MONOCYTES NFR BLD AUTO: 7 % (ref 4–12)
NEUTROPHILS # BLD AUTO: 13.61 THOUSANDS/ΜL (ref 1.85–7.62)
NEUTS SEG NFR BLD AUTO: 87 % (ref 43–75)
NITRITE UR QL STRIP: NEGATIVE
NON-SQ EPI CELLS URNS QL MICRO: NORMAL /HPF
NRBC BLD AUTO-RTO: 0 /100 WBCS
PH UR STRIP.AUTO: 7 [PH]
PLATELET # BLD AUTO: 332 THOUSANDS/UL (ref 149–390)
PLATELET # BLD AUTO: 349 THOUSANDS/UL (ref 149–390)
PMV BLD AUTO: 9.5 FL (ref 8.9–12.7)
PMV BLD AUTO: 9.6 FL (ref 8.9–12.7)
POTASSIUM SERPL-SCNC: 3.5 MMOL/L (ref 3.5–5.3)
PROCALCITONIN SERPL-MCNC: 11.76 NG/ML
PROT SERPL-MCNC: 5.8 G/DL (ref 6.4–8.2)
PROT SERPL-MCNC: 6.3 G/DL (ref 6.4–8.2)
PROT UR STRIP-MCNC: ABNORMAL MG/DL
PROTHROMBIN TIME: 15.8 SECONDS (ref 11.6–14.5)
RBC # BLD AUTO: 3.32 MILLION/UL (ref 3.81–5.12)
RBC #/AREA URNS AUTO: NORMAL /HPF
SODIUM SERPL-SCNC: 144 MMOL/L (ref 136–145)
SP GR UR STRIP.AUTO: <=1.005 (ref 1–1.03)
UROBILINOGEN UR QL STRIP.AUTO: 1 E.U./DL
WBC # BLD AUTO: 15.47 THOUSAND/UL (ref 4.31–10.16)
WBC #/AREA URNS AUTO: NORMAL /HPF

## 2022-04-22 PROCEDURE — 86663 EPSTEIN-BARR ANTIBODY: CPT | Performed by: PHYSICIAN ASSISTANT

## 2022-04-22 PROCEDURE — 86644 CMV ANTIBODY: CPT | Performed by: PHYSICIAN ASSISTANT

## 2022-04-22 PROCEDURE — 76705 ECHO EXAM OF ABDOMEN: CPT

## 2022-04-22 PROCEDURE — 99223 1ST HOSP IP/OBS HIGH 75: CPT | Performed by: INTERNAL MEDICINE

## 2022-04-22 PROCEDURE — 80076 HEPATIC FUNCTION PANEL: CPT

## 2022-04-22 PROCEDURE — 36415 COLL VENOUS BLD VENIPUNCTURE: CPT | Performed by: EMERGENCY MEDICINE

## 2022-04-22 PROCEDURE — 86664 EPSTEIN-BARR NUCLEAR ANTIGEN: CPT | Performed by: PHYSICIAN ASSISTANT

## 2022-04-22 PROCEDURE — 84484 ASSAY OF TROPONIN QUANT: CPT | Performed by: EMERGENCY MEDICINE

## 2022-04-22 PROCEDURE — 86645 CMV ANTIBODY IGM: CPT | Performed by: PHYSICIAN ASSISTANT

## 2022-04-22 PROCEDURE — 82948 REAGENT STRIP/BLOOD GLUCOSE: CPT

## 2022-04-22 PROCEDURE — 82248 BILIRUBIN DIRECT: CPT

## 2022-04-22 PROCEDURE — 86665 EPSTEIN-BARR CAPSID VCA: CPT | Performed by: PHYSICIAN ASSISTANT

## 2022-04-22 PROCEDURE — 84145 PROCALCITONIN (PCT): CPT

## 2022-04-22 PROCEDURE — 80143 DRUG ASSAY ACETAMINOPHEN: CPT | Performed by: EMERGENCY MEDICINE

## 2022-04-22 PROCEDURE — 85610 PROTHROMBIN TIME: CPT | Performed by: EMERGENCY MEDICINE

## 2022-04-22 PROCEDURE — 99449 NTRPROF PH1/NTRNET/EHR 31/>: CPT | Performed by: EMERGENCY MEDICINE

## 2022-04-22 PROCEDURE — 97163 PT EVAL HIGH COMPLEX 45 MIN: CPT

## 2022-04-22 PROCEDURE — 83605 ASSAY OF LACTIC ACID: CPT

## 2022-04-22 PROCEDURE — 80074 ACUTE HEPATITIS PANEL: CPT

## 2022-04-22 PROCEDURE — 85049 AUTOMATED PLATELET COUNT: CPT

## 2022-04-22 PROCEDURE — 80053 COMPREHEN METABOLIC PANEL: CPT | Performed by: EMERGENCY MEDICINE

## 2022-04-22 PROCEDURE — 87529 HSV DNA AMP PROBE: CPT | Performed by: PHYSICIAN ASSISTANT

## 2022-04-22 PROCEDURE — 81001 URINALYSIS AUTO W/SCOPE: CPT | Performed by: EMERGENCY MEDICINE

## 2022-04-22 PROCEDURE — 85025 COMPLETE CBC W/AUTO DIFF WBC: CPT

## 2022-04-22 RX ORDER — SODIUM CHLORIDE 9 MG/ML
75 INJECTION, SOLUTION INTRAVENOUS CONTINUOUS
Status: DISCONTINUED | OUTPATIENT
Start: 2022-04-22 | End: 2022-04-22

## 2022-04-22 RX ORDER — AMLODIPINE BESYLATE 5 MG/1
5 TABLET ORAL DAILY
Status: DISCONTINUED | OUTPATIENT
Start: 2022-04-22 | End: 2022-04-27 | Stop reason: HOSPADM

## 2022-04-22 RX ORDER — LISINOPRIL 20 MG/1
20 TABLET ORAL 2 TIMES DAILY
Status: DISCONTINUED | OUTPATIENT
Start: 2022-04-22 | End: 2022-04-27 | Stop reason: HOSPADM

## 2022-04-22 RX ADMIN — ENOXAPARIN SODIUM 30 MG: 40 INJECTION SUBCUTANEOUS at 08:28

## 2022-04-22 RX ADMIN — VERAPAMIL HYDROCHLORIDE 180 MG: 180 TABLET ORAL at 08:51

## 2022-04-22 RX ADMIN — ACETYLCYSTEINE 3005 MG: 200 INJECTION, SOLUTION INTRAVENOUS at 01:52

## 2022-04-22 RX ADMIN — LISINOPRIL 20 MG: 20 TABLET ORAL at 08:28

## 2022-04-22 RX ADMIN — ACETYLCYSTEINE 9015 MG: 200 INJECTION, SOLUTION INTRAVENOUS at 00:08

## 2022-04-22 RX ADMIN — ACETYLCYSTEINE 6010 MG: 200 INJECTION, SOLUTION INTRAVENOUS at 06:13

## 2022-04-22 RX ADMIN — SODIUM CHLORIDE 75 ML/HR: 0.9 INJECTION, SOLUTION INTRAVENOUS at 03:57

## 2022-04-22 RX ADMIN — AMLODIPINE BESYLATE 5 MG: 5 TABLET ORAL at 08:28

## 2022-04-22 RX ADMIN — CEFTRIAXONE 1000 MG: 1 INJECTION, POWDER, FOR SOLUTION INTRAMUSCULAR; INTRAVENOUS at 20:18

## 2022-04-22 RX ADMIN — LISINOPRIL 20 MG: 20 TABLET ORAL at 17:31

## 2022-04-22 NOTE — CONSULTS
Consultation - [facility]  Ema Melvin 80 y o  female MRN: [id number]  Unit/Bed#: S -08 Encounter: [id number]        Inpatient consult to gastroenterology  Consult performed by: Peyman Barakat PA-C  Consult ordered by: David Kerr MD          Reason for Consult / Principal Problem: elevated LFTs    ASSESSMENT and PLAN:    Principal Problem:    Sepsis ([address] )  Active Problems:    Essential hypertension    Type 2 diabetes mellitus without complication, without long-term current use of insulin (HCC)    Acetaminophen toxicity    Transaminitis    #1  Elevated LFTs: patient presented with upper abdomina pain and fever with highly elevated LFTs  AST 2363 down to 1410  ALT [id number]  Alk-phos 666 down to 517  T bili was 1 01 now 0 37  Her Tylenol level was detectable however she had gotten Tylenol with EMS prior to admission  She does not take more than 3 to 4 Tylenol capsules daily  Medical toxicology also on board  Doubt that this is secondary to a Tylenol overdose  Suspect that this could be related to ischemia versus viral etiology  CT scan was negative  Right upper quadrant ultrasound showed some ring down artifact throughout the liver which could raise the possibility of pneumobilia but this was not noticed on contrast enhanced CT scan yesterday  Patient's pain has resolved  -can complete NAC therapy  -check CMV, HSV, EBV  -follow up acute hepatitis panel  -monitor LFTs and INR closely, will repeat in AM  -continue abx per SLIM  -------------------------------------------------------------------------------------------------------------------    HPI:  This is a 8-year-old female with a history of diabetes, hypertension, and macular degeneration who presented to the hospital yesterday secondary to upper abdominal pain and fever  The regional node had reported that she was having chest pain however patient reports that she was having pain under both breasts equally  Denies any nausea or vomiting to me  Denies any diarrhea or constipation  Denies any black stool or blood in the stool  Patient at this point reports that the pain has completely resolved  She denied having any lightheadedness, dizziness, or syncope at home  She does admit to a decreased water intake recently  Appetite is okay  She reports taking about 3 to 4 tablets of Tylenol daily for many years  She denies taking any more recently  It does not sound like she has had any recent antibiotics or other medications in the last few weeks  Denies any alcohol use  Denies any NSAID use  REVIEW OF SYSTEMS:    CONSTITUTIONAL: Denies any chills, or rigors  Good appetite, and no recent weight loss  +fever  HEENT: No earache or tinnitus  Denies hearing loss or visual disturbances  CARDIOVASCULAR: No chest pain or palpitations  RESPIRATORY: Denies any cough, hemoptysis, shortness of breath or dyspnea on exertion  GASTROINTESTINAL: As noted in the History of Present Illness  GENITOURINARY: No problems with urination  Denies any hematuria or dysuria  NEUROLOGIC: No dizziness or vertigo, denies headaches  MUSCULOSKELETAL: Denies any muscle or joint pain  SKIN: Denies skin rashes or itching  ENDOCRINE: Denies excessive thirst  Denies intolerance to heat or cold  PSYCHOSOCIAL: Denies depression or anxiety  Denies any recent memory loss  Historical Information   Past Medical History:   Diagnosis Date    Diabetes mellitus ([facility] [address] )     Hypertension     Macular degeneration      History reviewed  No pertinent surgical history  Social History   Social History     Substance and Sexual Activity   Alcohol Use No     Social History     Substance and Sexual Activity   Drug Use No     Social History     Tobacco Use   Smoking Status Never Smoker   Smokeless Tobacco Not on file     History reviewed  No pertinent family history      Meds/Allergies     Medications Prior to Admission   Medication    amLODIPine (NORVASC) 5 mg tablet    Control Gel Formula Dressing (DuoDERM CGF Extra Thin) MISC    lisinopril (ZESTRIL) 20 mg tablet    Multiple Vitamins-Minerals (CENTRUM SILVER PO)    nystatin (MYCOSTATIN) cream    sitaGLIPtin-metFORMIN (JANUMET)  MG per tablet    verapamil (VERELAN PM) 180 MG 24 hr capsule     Current Facility-Administered Medications   Medication Dose Route Frequency    acetylcysteine (ACETADOTE) 6,010 mg in dextrose 5 % 1,000 mL IVPB  100 mg/kg Intravenous Once    amLODIPine (NORVASC) tablet 5 mg  5 mg Oral Daily    cefTRIAXone (ROCEPHIN) 1,000 mg in dextrose 5 % 50 mL IVPB  1,000 mg Intravenous Q24H    enoxaparin (LOVENOX) subcutaneous injection 30 mg  30 mg Subcutaneous Daily    insulin lispro (HumaLOG) 100 units/mL subcutaneous injection 1-5 Units  1-5 Units Subcutaneous TID AC    lisinopril (ZESTRIL) tablet 20 mg  20 mg Oral BID    sodium chloride 0 9 % infusion  75 mL/hr Intravenous Continuous    verapamil (CALAN-SR) CR tablet 180 mg  180 mg Oral Daily       No Known Allergies        Objective     Blood pressure 158/60, pulse 96, temperature 98 °F (36 7 °C), resp  rate 18, height 5' 1" (1 549 m), weight 60 1 kg (132 lb 7 9 oz), SpO2 97 %  Intake/Output Summary (Last 24 hours) at 4/22/2022 1039  Last data filed at 4/22/2022 0836  Gross per 24 hour   Intake 1550 ml   Output 1450 ml   Net 100 ml         PHYSICAL EXAM:      General Appearance:   Alert, cooperative, no distress, appears stated age    HEENT:   Normocephalic, atraumatic, anicteric, no oropharyngeal thrush present      Neck:  Supple, symmetrical, trachea midline, no adenopathy;    thyroid: no enlargement/tenderness/nodules; no carotid  bruit or JVD    Lungs:   Clear to auscultation bilaterally; no rales, rhonchi or wheezing; respirations unlabored    Heart[de-identified]   S1 and S2 normal; regular rate and rhythm; no murmur, rub, or gallop     Abdomen:   Soft, non-tender, non-distended; normal bowel sounds; no masses, no organomegaly    Genitalia:   Deferred    Rectal:   Deferred    Extremities:  No cyanosis, clubbing or edema    Pulses:  2+ and symmetric all extremities    Skin:  Skin color, texture, turgor normal, no rashes or lesions    Lymph nodes:  No palpable cervical, axillary or inguinal lymphadenopathy        Lab Results:   Results from last 7 days   Lab Units 04/22/22  0522 04/22/22  0521 04/22/22  0521   WBC Thousand/uL  --   --  15 47*   HEMOGLOBIN g/dL  --   --  10 2*   HEMATOCRIT %  --   --  32 5*   PLATELETS Thousands/uL 349   < > 332   NEUTROS PCT %  --   --  87*   LYMPHS PCT %  --   --  5*   MONOS PCT %  --   --  7   EOS PCT %  --   --  0    < > = values in this interval not displayed  Results from last 7 days   Lab Units 04/22/22  0728   POTASSIUM mmol/L 3 5   CHLORIDE mmol/L 108   CO2 mmol/L 24   BUN mg/dL 16   CREATININE mg/dL 0 57*   CALCIUM mg/dL 8 1*   ALK PHOS U/L 517*   ALT U/L 1,516*   AST U/L 1,410*     Results from last 7 days   Lab Units 04/22/22  0728   INR  1 27*           Imaging Studies: I have personally reviewed pertinent imaging studies  XR chest 1 view portable    Result Date: 4/22/2022  Impression: No acute cardiopulmonary disease  Workstation performed: [id number]     CT abdomen pelvis with contrast    Result Date: 4/21/2022  Impression: No evidence of acute intra-abdominal or pelvic pathology  Workstation performed: [id number]           Patient was seen and examined by Dr Tonia Ramirez  All navarro medical decisions were made by Dr Tonia Ramirez  Thank you for allowing us to participate in the care of this present patient  We will follow-up with you closely

## 2022-04-22 NOTE — PLAN OF CARE
Problem: Potential for Falls  Goal: Patient will remain free of falls  Description: INTERVENTIONS:  - Educate patient/family on patient safety including physical limitations  - Instruct patient to call for assistance with activity   - Consult OT/PT to assist with strengthening/mobility   - Keep Call bell within reach  - Keep bed low and locked with side rails adjusted as appropriate  - Keep care items and personal belongings within reach  - Initiate and maintain comfort rounds  - Make Fall Risk Sign visible to staff  - Offer Toileting every 4 Hours, in advance of need  - Initiate/Maintain bed alarm  - Obtain necessary fall risk management equipment: bed alarm yellow bracelet  - Apply yellow socks and bracelet for high fall risk patients  - Consider moving patient to room near nurses station  Outcome: Progressing

## 2022-04-22 NOTE — ASSESSMENT & PLAN NOTE
Recent Labs     04/21/22 1954 04/22/22  0728   AST 2,363* 1,410*   ALT 1,507* 1,516*   ALKPHOS 666* 517*   TBILI 1 01* 0 37     Acetaminophen:  Daily use unknown dosage  Acetaminophen level 18 5    Imaging  · CT Abd/Pev:  Unremarkable  · U/S:  Pending  There is a mixed pattern in terms of the transaminitis  Status post acetylcysteine infusion in the emergency department  Etiologies:  Most likely secondary to infarction  Need to rule out viral etiology     Plan:  · Continue to trend LFTs, INR  · GI on board  · Follow Hepatitis panel

## 2022-04-22 NOTE — ASSESSMENT & PLAN NOTE
POA: Sepsis as evidenced by fever 101 7, tachycardia 98, leukocytosis 11 06       Recent Labs     04/21/22  1954 04/22/22  0521   WBC 11 06* 15 47*     Recent Labs     04/21/22 2021 04/21/22 2158 04/22/22  0014   LACTICACID 5 6* 3 3* 1 3       /70   Pulse 84   Temp 98 1 °F (36 7 °C)   Resp 16   Ht 5' 1" (1 549 m)   Wt 60 1 kg (132 lb 7 9 oz)   LMP  (LMP Unknown)   SpO2 95%   BMI 25 03 kg/m²     CT abdomen pelvis with contrast was unremarkable  Ultrasound right upper quadrant shows pneumobilia, normal common bile duct  Chest x-ray unremarkable  Urinalysis unremarkable  Patient had 1/2 E coli on blood cultures  Plan:   Abx:  Ceftriaxone 1 g Q 24 hours   Discontinue IV fluids   Two HIDA scan rule out acute cholecystitis given ultrasound findings   Continue to follow culture sensitivities

## 2022-04-22 NOTE — PLAN OF CARE
Problem: Potential for Falls  Goal: Patient will remain free of falls  Description: INTERVENTIONS:  - Educate patient/family on patient safety including physical limitations  - Instruct patient to call for assistance with activity   - Consult OT/PT to assist with strengthening/mobility   - Keep Call bell within reach  - Keep bed low and locked with side rails adjusted as appropriate  - Keep care items and personal belongings within reach  - Initiate and maintain comfort rounds  - Apply yellow socks and bracelet for high fall risk patients  - Consider moving patient to room near nurses station  Outcome: Progressing     Problem: Prexisting or High Potential for Compromised Skin Integrity  Goal: Skin integrity is maintained or improved  Description: INTERVENTIONS:  - Identify patients at risk for skin breakdown  - Assess and monitor skin integrity  - Assess and monitor nutrition and hydration status  - Monitor labs   - Assess for incontinence   - Turn and reposition patient  - Assist with mobility/ambulation  - Relieve pressure over bony prominences  - Avoid friction and shearing  - Provide appropriate hygiene as needed including keeping skin clean and dry  - Evaluate need for skin moisturizer/barrier cream  - Collaborate with interdisciplinary team   - Patient/family teaching  - Consider wound care consult   Outcome: Progressing     Problem: MOBILITY - ADULT  Goal: Maintain or return to baseline ADL function  Description: INTERVENTIONS:  -  Assess patient's ability to carry out ADLs; assess patient's baseline for ADL function and identify physical deficits which impact ability to perform ADLs (bathing, care of mouth/teeth, toileting, grooming, dressing, etc )  - Assess/evaluate cause of self-care deficits   - Assess range of motion  - Assess patient's mobility; develop plan if impaired  - Assess patient's need for assistive devices and provide as appropriate  - Encourage maximum independence but intervene and supervise when necessary  - Involve family in performance of ADLs  - Assess for home care needs following discharge   - Consider OT consult to assist with ADL evaluation and planning for discharge  - Provide patient education as appropriate  Outcome: Progressing  Goal: Maintains/Returns to pre admission functional level  Description: INTERVENTIONS:  - Perform BMAT or MOVE assessment daily    - Set and communicate daily mobility goal to care team and patient/family/caregiver     - Collaborate with rehabilitation services on mobility goals if consulted  - Record patient progress and toleration of activity level   Outcome: Progressing

## 2022-04-22 NOTE — H&P
[facility] 7/19/1918, 80 y o  female MRN: [id number]  Unit/Bed#: S -01 Encounter: [id number]  Primary Care Provider: Salvador Mendiola MD   Date and time admitted to hospital: 4/21/2022  7:24 PM    Sepsis [facility])  Assessment & Plan  POA: Sepsis as evidenced by fever 101 7, tachycardia 98, leukocytosis 11 06  Suspected source:  Urinary tract infection    Recent Labs     04/21/22 1954   WBC 11 06*     Recent Labs     04/21/22 2021 04/21/22 2158 04/22/22  0014   LACTICACID 5 6* 3 3* 1 3       /62 (BP Location: Left arm)   Pulse 78   Temp 99 5 °F (37 5 °C) (Oral)   Resp 18   Ht 5' 1" (1 549 m)   Wt 60 1 kg (132 lb 7 9 oz)   LMP  (LMP Unknown)   SpO2 94%   BMI 25 03 kg/m²     CT abdomen pelvis with contrast   No evidence of acute intra-abdominal or pelvic pathology  · Chest x-ray unremarkable    · In ED fluid resuscitation 30ml/KG received 1500ml  Plan:   Follow-up pending blood culture   Follow-up pending urine culture   Abx:  Ceftriaxone 1 g Q 24 hours   IVF: normal saline at 75 mL/hour        Acetaminophen toxicity  Assessment & Plan  · Takes daily acetaminophen 4 tablets per day unknown dosage  · Serum acetaminophen level 15 8    Plan  · Continue acetylcysteine started in the ED  · Trend LFTs  · Consult toxicology  Type 2 diabetes mellitus without complication, without long-term current use of insulin [facility])  Assessment & Plan  Lab Results   Component Value Date    HGBA1C 5 8 12/18/2018       No results for input(s): POCGLU in the last 72 hours  Blood Sugar Average: Last 72 hrs:  ·    Plan  · Hold Janumet  · Insulin sliding scale  · Hemoglobin A1c  Essential hypertension  Assessment & Plan  · Blood pressure acceptable  · Continue amlodipine 5 mg daily  · Continue verapamil 180 mg daily    · Continue lisinopril 20 mg b i d     Transaminitis  Assessment & Plan  Recent Labs     04/21/22 1954   AST 2,363*   ALT 1,507* ALKPHOS 666*   TBILI 1 01*     Acetaminophen:  Daily use unknown dosage  Acetaminophen level 18 5    Imaging  · CT Abd/Pev:  Unremarkable  · U/S:  Pending  There is a mixed pattern in terms of the transaminitis    Plan:  · Continue to trend LFTs  · Hepatitis panel  · Consider G I Consult if worsening liver function  VTE Pharmacologic Prophylaxis: VTE Score: 8 High Risk (Score >/= 5) - Pharmacological DVT Prophylaxis Ordered: heparin  Sequential Compression Devices Ordered  Code Status: Level 1 - Full Code   Discussion with family: Attempted to update  (daughter) via phone  Unable to contact  Anticipated Length of Stay: Patient will be admitted on an inpatient basis with an anticipated length of stay of greater than 2 midnights secondary to Sepsis, acetaminophen poisoning       Chief Complaint:  Fever, nausea, vomiting and abdominal pain  History of Present Illness:  Shannan Fulton is a 80 y o  female with a PMH of Hypertension, type 2 diabetes mellitus, who presents with fevers, nausea, vomiting and abdominal pain  Symptoms started acutely today  Denies hematemesis or dark colored emesis  Denies cough, congestion, sick contacts or recent travel  Denies chest pain or heart palpitations  Denies diarrhea constipation  Denies dysuria, frequency or urgency  Daughter who is the primary caregiver reports that patient's urine has been foul smelling lately  In the ED patient noted to be febrile, tachycardic  Labs significant for leukocytosis, lactic acidosis concerning for sepsis suspected source of infection being urinary tract  Patient also noted with transaminitis in the thousands and with an elevated serum acetaminophen level  Reports significant acetaminophen intake daily  Concern for unintentional acetaminophen poisoning  Review of Systems:  Review of Systems   Constitutional: Positive for fever  Negative for chills  HENT: Negative for ear pain and sore throat      Eyes: Negative for pain and visual disturbance  Respiratory: Negative for cough and shortness of breath  Cardiovascular: Negative for chest pain and palpitations  Gastrointestinal: Positive for abdominal pain, nausea and vomiting  Negative for constipation and diarrhea  Genitourinary: Negative for dysuria and hematuria  Musculoskeletal: Negative for arthralgias and back pain  Skin: Negative for color change and rash  Neurological: Negative for seizures and syncope  All other systems reviewed and are negative  Past Medical and Surgical History:   Past Medical History:   Diagnosis Date    Diabetes mellitus ([facility] [address] )     Hypertension     Macular degeneration        History reviewed  No pertinent surgical history  Meds/Allergies:  Prior to Admission medications    Medication Sig Start Date End Date Taking? Authorizing Provider   amLODIPine (NORVASC) 5 mg tablet TAKE 1 TABLET(5 MG) BY MOUTH DAILY 10/11/21  Yes Jorge A Gillespie MD   Control Gel Formula Dressing (DuoDERM CGF Extra Thin) MISC Apply to affected area every 3 days 1/15/21  Yes Jorge A Gillespie MD   lisinopril (ZESTRIL) 20 mg tablet TAKE 1 TABLET(20 MG) BY MOUTH TWICE DAILY 7/27/21  Yes Jorge A Gillespie MD   Multiple Vitamins-Minerals (CENTRUM SILVER PO) Take 1 tablet by mouth daily   Yes Historical Provider, MD   nystatin (MYCOSTATIN) cream Apply topically 2 (two) times a day 5/15/20  Yes Jorge A Gillespie MD   sitaGLIPtin-metFORMIN [facility])  MG per tablet Take 1 tablet by mouth daily after dinner 4/4/22  Yes Jorge A Gillespie MD   verapamil (VERELAN PM) 180 MG 24 hr capsule TAKE 1 CAPSULE(180 MG) BY MOUTH DAILY 10/11/21  Yes Jorge A Gillespie MD     I have reviewed home medications using recent Epic encounter  Allergies: No Known Allergies    Social History:  Marital Status:     Occupation:  Retired  Patient Pre-hospital Living Situation: Home  Patient Pre-hospital Level of Mobility: manual wheelchair  Patient Pre-hospital Diet Restrictions: None  Substance Use History:   Social History     Substance and Sexual Activity   Alcohol Use No     Social History     Tobacco Use   Smoking Status Never Smoker   Smokeless Tobacco Not on file     Social History     Substance and Sexual Activity   Drug Use No       Family History:  History reviewed  No pertinent family history  Physical Exam:     Vitals:   Blood Pressure: 158/73 (04/22/22 0129)  Pulse: 89 (04/22/22 0129)  Temperature: 98 9 °F (37 2 °C) (04/22/22 0129)  Temp Source: Oral (04/21/22 2300)  Respirations: 18 (04/22/22 0030)  Height: 5' 1" (154 9 cm) (04/21/22 1947)  Weight - Scale: 60 1 kg (132 lb 7 9 oz) (04/21/22 1947)  SpO2: 93 % (04/22/22 0129)    Physical Exam  Vitals and nursing note reviewed  Constitutional:       General: She is not in acute distress  Appearance: She is well-developed  She is ill-appearing  HENT:      Head: Normocephalic and atraumatic  Eyes:      General: No scleral icterus  Right eye: No discharge  Left eye: No discharge  Pupils: Pupils are equal, round, and reactive to light  Cardiovascular:      Rate and Rhythm: Normal rate and regular rhythm  Heart sounds: No murmur heard  Pulmonary:      Effort: Pulmonary effort is normal  No respiratory distress  Breath sounds: Normal breath sounds  No wheezing, rhonchi or rales  Abdominal:      General: Bowel sounds are normal       Palpations: Abdomen is soft  Tenderness: There is generalized abdominal tenderness  There is no right CVA tenderness, guarding or rebound  Negative signs include Chew's sign  Musculoskeletal:      Cervical back: Neck supple  Right lower leg: No edema  Left lower leg: No edema  Skin:     General: Skin is warm and dry  Capillary Refill: Capillary refill takes less than 2 seconds  Neurological:      General: No focal deficit present  Mental Status: She is alert and oriented to person, place, and time  Psychiatric:         Mood and Affect: Mood normal          Behavior: Behavior normal          Additional Data:     Lab Results:  Results from last 7 days   Lab Units 04/21/22 1954   WBC Thousand/uL 11 06*   HEMOGLOBIN g/dL 11 0*   HEMATOCRIT % 34 8   PLATELETS Thousands/uL 379   NEUTROS PCT % 93*   LYMPHS PCT % 2*   MONOS PCT % 5   EOS PCT % 0     Results from last 7 days   Lab Units 04/21/22 1954   SODIUM mmol/L 140   POTASSIUM mmol/L 3 8   CHLORIDE mmol/L 104   CO2 mmol/L 22   BUN mg/dL 26*   CREATININE mg/dL 0 81   ANION GAP mmol/L 14*   CALCIUM mg/dL 8 5   ALBUMIN g/dL 3 0*   TOTAL BILIRUBIN mg/dL 1 01*   ALK PHOS U/L 666*   ALT U/L 1,507*   AST U/L 2,363*   GLUCOSE RANDOM mg/dL 136     Results from last 7 days   Lab Units 04/21/22 1954   INR  1 09             Results from last 7 days   Lab Units 04/22/22  0014 04/21/22  2158 04/21/22 2021   LACTIC ACID mmol/L 1 3 3 3* 5 6*       Imaging: Reviewed radiology reports from this admission including: chest xray and abdominal/pelvic CT  CT abdomen pelvis with contrast   Final Result by Nicola Link MD (04/21 2212)      No evidence of acute intra-abdominal or pelvic pathology  Workstation performed: [id number]         XR chest 1 view portable   ED Interpretation by Sanjana Espinoza MD (04/21 2113)   No acute findings      US right upper quadrant with liver dopplers    (Results Pending)       EKG and Other Studies Reviewed on Admission:   · EKG: NSR  HR 95     ** Please Note: This note has been constructed using a voice recognition system   **

## 2022-04-22 NOTE — ASSESSMENT & PLAN NOTE
Lab Results   Component Value Date    HGBA1C 5 5 04/21/2022       Recent Labs     04/22/22  0803 04/22/22  1110 04/22/22  1611   POCGLU 131 98 125       Blood Sugar Average: Last 72 hrs:  · (P) 118   Plan  · Hold Janumet  · Insulin sliding scale  · Hemoglobin A1c

## 2022-04-22 NOTE — PLAN OF CARE
Problem: PHYSICAL THERAPY ADULT  Goal: Performs mobility at highest level of function for planned discharge setting  See evaluation for individualized goals  Description: Treatment/Interventions: Functional transfer training,LE strengthening/ROM,Therapeutic exercise,Endurance training,Cognitive reorientation,Patient/family training,Equipment eval/education,Bed mobility,Gait training          See flowsheet documentation for full assessment, interventions and recommendations  Note: Prognosis: Fair  Problem List: Decreased strength,Decreased range of motion,Decreased endurance,Decreased mobility,Impaired balance,Decreased cognition,Decreased safety awareness,Impaired vision,Impaired hearing,Decreased skin integrity,Pain  Assessment: Pt presents with fevers, nausea, vomiting and abdominal pain  Dx: sepsis, essential HTN, DM, acetaminophen toxicity, and transaminitis  order placed for PT eval and tx  pt presents w/ comorbidities of HTN, macular degeneration, and DM and personal factors of advanced age, mobilizing w/ assistive device, positive fall history, hearing impairments, visual impairments, inability to perform IADLs and inability to perform ADLs  pt presents w/ pain, weakness, decreased ROM, decreased endurance, impaired balance, impaired hearing, visual impairment, decreased safety awareness, fall risk and impaired skin integrity  these impairments are evident in findings from physical examination (weakness, decreased ROM and impaired skin integrity), mobility assessment (need for max to total assist w/ all phases of mobility, inability to ambulate and need for cueing for mobility technique), and Barthel Index: 15/100  pt needed input for task focus and mobility technique  pt is at risk for falls due to physical and safety awareness deficits   pt's clinical presentation is unstable/unpredictable (evident in need for assist w/ all phases of mobility, pain impacting overall mobility status and need for input for task focus and mobility technique/safety w/ limited carryover of education received)  pt needs inpatient PT tx to improve mobility deficits and progress mobility training as appropriate  discharge recommendation is for inpatient rehab to reduce fall risk and maximize level of functional independence  Pt would benefit from Wound Care consult to address skin integrity and Gerontology consult to address cognition and aging related issues  PT Discharge Recommendation: Post acute rehabilitation services          See flowsheet documentation for full assessment

## 2022-04-22 NOTE — ASSESSMENT & PLAN NOTE
POA: Sepsis as evidenced by fever 101 7, tachycardia 98, leukocytosis 11 06  Suspected source:  Urinary tract infection    Recent Labs     04/21/22  1954   WBC 11 06*     Recent Labs     04/21/22 2021 04/21/22 2158 04/22/22  0014   LACTICACID 5 6* 3 3* 1 3       /62 (BP Location: Left arm)   Pulse 78   Temp 99 5 °F (37 5 °C) (Oral)   Resp 18   Ht 5' 1" (1 549 m)   Wt 60 1 kg (132 lb 7 9 oz)   LMP  (LMP Unknown)   SpO2 94%   BMI 25 03 kg/m²     CT abdomen pelvis with contrast   No evidence of acute intra-abdominal or pelvic pathology  · Chest x-ray unremarkable    · In ED fluid resuscitation 30ml/KG received 1500ml  Plan:   Follow-up pending blood culture   Follow-up pending urine culture   Abx:  Ceftriaxone 1 g Q 24 hours     IVF: normal saline at 75 mL/hour

## 2022-04-22 NOTE — ASSESSMENT & PLAN NOTE
· Blood pressure acceptable  · Continue amlodipine 5 mg daily  · Continue verapamil 180 mg daily  · Continue lisinopril 20 mg b i d

## 2022-04-22 NOTE — ASSESSMENT & PLAN NOTE
Lab Results   Component Value Date    HGBA1C 5 8 12/18/2018       No results for input(s): POCGLU in the last 72 hours  Blood Sugar Average: Last 72 hrs:  ·    Plan  · Hold Janumet  · Insulin sliding scale  · Hemoglobin A1c

## 2022-04-22 NOTE — PROGRESS NOTES
Received phone call from poison control, Darin Vuong, jony to check AST, ALT, and tylenol level 2hrs before 3rd bag of acetylcysteine ends  Notified Yeny Timmons resident who ordered toxicology consult

## 2022-04-22 NOTE — PHYSICAL THERAPY NOTE
PHYSICAL THERAPY EVALUATION NOTE    Patient Name: Mj Mcdonough  [id number] Date: 4/22/2022  AGE:   80 y o  Mrn:   [id number]  ADMIT DX:  Chest pain [R07 9]  Elevated LFTs [R79 89]  Sepsis ([facility] [address] ) [A41 9]    Past Medical History:   Diagnosis Date    Diabetes mellitus ([facility] )     Hypertension     Macular degeneration      Length Of Stay: 1  PHYSICAL THERAPY EVALUATION :    04/22/22 1656   PT Last Visit   PT Visit Date 04/22/22   Pain Assessment   Pain Assessment Tool FLACC   Pain Location/Orientation Other (Comment)  (right heel)   Hospital Pain Intervention(s) Repositioned   Pain Rating: FLACC (Rest) - Face 0   Pain Rating: FLACC (Rest) - Legs 0   Pain Rating: FLACC (Rest) - Activity 0   Pain Rating: FLACC (Rest) - Cry 1   Pain Rating: FLACC (Rest) - Consolability 1   Score: FLACC (Rest) 2   Pain Rating: FLACC (Activity) - Face 1   Pain Rating: FLACC (Activity) - Legs 0   Pain Rating: FLACC (Activity) - Activity 0   Pain Rating: FLACC (Activity) - Cry 1   Pain Rating: FLACC (Activity) - Consolability 1   Score: FLACC (Activity) 3   Restrictions/Precautions   Other Precautions Chair Alarm; Bed Alarm;Multiple lines; Fall Risk;Pain;Hard of hearing;Visual impairment  ([place])   Home Living   Type of [address] [place] [address] Two level; Able to live on main level with bedroom/bathroom; Other (Comment)  (3 SANDRA)   Additional Comments lives w/ daughter and XU  ambulates w/ roller walker and family assist  owns transport chair and bedside commode  walk in shower w/ grab bars and shower chair  needs assist w/ ADLs and IADLs  + fall history  pt ambulates 50 feet at most and does not leave the house  Prior Function   Comments pt noted to have area erythema of right medial Achilles tendon and area of point tenderness  [facility] was notified     General   Additional Pertinent History room air resting pulse ox 98% and 91 BPM, active 94% and 104 BPM  Family/Caregiver Present Yes   Cognition   Arousal/Participation Cooperative   Orientation Level Oriented to person; Other (Comment)  (pt was identified w/ full name, birth date)   Following Commands Follows one step commands inconsistently   Subjective   Subjective pt seen supine in bed w/ daughter and XU present  pt agreed to PT eval  reports having right heel pain  input was needed for task focus  pt's family provided social history   RUE Assessment   RUE Assessment X  (3/5, shoulder 2/5)   LUE Assessment   LUE Assessment X  (limited active/passive ROM)   RLE Assessment   RLE Assessment X  (3/5, ankle 3-/5)   LLE Assessment   LLE Assessment X  (3-/5)   Bed Mobility   Supine to Sit 2  Maximal assistance   Additional items Assist x 1;HOB elevated; Bedrails; Increased time required;Verbal cues;LE management  (for trunk/LE positioning)   Transfers   Sit to Stand 2  Maximal assistance   Additional items Assist x 1; Increased time required;Verbal cues  (for hand placement, LE positioning)   Stand to Sit 2  Maximal assistance   Additional items Assist x 1; Impulsive;Verbal cues  (for body positioning, controlled descent, safety)   Stand pivot 1  Dependent   Additional items Assist x 1; Increased time required;Verbal cues  (for walker positioning, safety)   Additional Comments pt declined additional mobilization due to arrival of dinner  Ambulation/Elevation   Gait pattern Not appropriate  (pt was unable to advance LEs in standing position)   Assistive Device Rolling walker   Balance   Static [place]itting [place] -   Static Standing Zero  (w/ roller walker)   Ambulatory Zero   Activity Tolerance   Activity Tolerance Patient limited by fatigue   Nurse Made Aware spoke to [facility]   Assessment   Prognosis Fair   Problem List Decreased strength;Decreased range of motion;Decreased endurance;Decreased mobility; Impaired balance;Decreased cognition;Decreased safety awareness; Impaired vision; Impaired hearing;Decreased skin integrity;Pain   Assessment Pt presents with fevers, nausea, vomiting and abdominal pain  Dx: sepsis, essential HTN, DM, acetaminophen toxicity, and transaminitis  order placed for PT eval and tx  pt presents w/ comorbidities of HTN, macular degeneration, and DM and personal factors of advanced age, mobilizing w/ assistive device, positive fall history, hearing impairments, visual impairments, inability to perform IADLs and inability to perform ADLs  pt presents w/ pain, weakness, decreased ROM, decreased endurance, impaired balance, impaired hearing, visual impairment, decreased safety awareness, fall risk and impaired skin integrity  these impairments are evident in findings from physical examination (weakness, decreased ROM and impaired skin integrity), mobility assessment (need for max to total assist w/ all phases of mobility, inability to ambulate and need for cueing for mobility technique), and Barthel Index: 15/100  pt needed input for task focus and mobility technique  pt is at risk for falls due to physical and safety awareness deficits  pt's clinical presentation is unstable/unpredictable (evident in need for assist w/ all phases of mobility, pain impacting overall mobility status and need for input for task focus and mobility technique/safety w/ limited carryover of education received)  pt needs inpatient PT tx to improve mobility deficits and progress mobility training as appropriate  discharge recommendation is for inpatient rehab to reduce fall risk and maximize level of functional independence  Pt would benefit from Wound Care consult to address skin integrity and Gerontology consult to address cognition and aging related issues  Goals   Patient Goals pt did not state goals when asked  STG Expiration Date 05/02/22   Short Term Goal #1 pt will:  Increase bilateral LE strength 1/2 grade to facilitate independent mobility, Perform bed mobility w/ minx1 to increase level of independence, Perform all transfers w/ minx1 to improve independence, Ambulate 50 ft  with roller walker w/ minx1 w/o LOB to improve functional independence, Increase all balance 1 grade to decrease risk for falls, Tolerate 3 hr OOB to faciliate upright tolerance and Improve Barthel Index score to 40 or greater to facilitate independence   PT Treatment Day 0   Plan   Treatment/Interventions Functional transfer training;LE strengthening/ROM; Therapeutic exercise; Endurance training;Cognitive reorientation;Patient/family training;Equipment eval/education; Bed mobility;Gait training   PT Frequency 4-6x/wk   Recommendation   PT Discharge Recommendation Post acute rehabilitation services   Additional Comments Pt would benefit from Wound Care consult to address skin integrity and Gerontology consult to address cognition and aging related issues  Additional Comments 2 recommend roller walker use w/ mobility   AM-PAC Basic Mobility Inpatient   Turning in Bed Without Bedrails 2   Lying on Back to Sitting on Edge of Flat Bed 2   Moving Bed to Chair 1   Standing Up From Chair 2   Walk in Room 1   Climb 3-5 Stairs 1   Basic Mobility Inpatient Raw Score 9   Turning Head Towards Sound 4   Follow Simple Instructions 3   Low Function Basic Mobility Raw Score 16   Low Function Basic Mobility Standardized Score 25 72   Highest Level Of Mobility   -[facility] Goal 3: Sit at edge of bed   -[facility] Highest Level of Mobility 4: Move to chair/commode   -[facility] Goal Achieved Yes   Barthel Index   Feeding 0   Bathing 0   Grooming Score 0   Dressing Score 0   Bladder Score 0   Bowels Score 10   Toilet Use Score 0   Transfers (Bed/Chair) Score 5   Mobility (Level Surface) Score 0   Stairs Score 0   Barthel Index Score 15   End of Consult   Patient Position at End of Consult Bedside chair;Bed/Chair alarm activated; All needs within reach   End of Consult Comments time of session was extended due to interuption by GI service       The patient's AM-PAC Basic Mobility Inpatient Short Form Raw Score is 9  A Raw score of less than or equal to 16 suggests the patient may benefit from discharge to post-acute rehabilitation services  Please also refer to the recommendation of the Physical Therapist for safe discharge planning  Skilled PT recommended while in hospital and upon DC to progress pt toward treatment goals       Violeta Dubon, PT

## 2022-04-22 NOTE — ASSESSMENT & PLAN NOTE
Recent Labs     04/21/22 1954   AST 2,363*   ALT 1,507*   ALKPHOS 666*   TBILI 1 01*     Acetaminophen:  Daily use unknown dosage  Acetaminophen level 18 5    Imaging  · CT Abd/Pev:  Unremarkable  · U/S:  Pending  There is a mixed pattern in terms of the transaminitis    Plan:  · Continue to trend LFTs  · Hepatitis panel  · Consider G I Consult if worsening liver function

## 2022-04-22 NOTE — CASE MANAGEMENT
Case Management Discharge Planning Note    Patient name Lacey Garcia  Location S /S -01 MRN [id number]  : 1918 Date 2022       Current Admission Date: 2022  Current Admission Diagnosis:Sepsis [facility])   Patient Active Problem List    Diagnosis Date Noted    Acetaminophen toxicity 2022    Transaminitis 2022    Sepsis ([facility] [address] ) 2022    [facility]ox[facility] fibrillation ([facility] ) 10/16/2020    Essential hypertension 10/22/2019    Primary osteoarthritis of left hip 10/22/2019    Ambulatory dysfunction 10/22/2019    Advanced age 10/22/2019    Type 2 diabetes mellitus without complication, without long-term current use of insulin ([facility] ) 10/22/2019    ARMD (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 degeneration) 10/22/2019    Left sided lacunar infarction ([facility] [address] ) 10/22/2019    Primary osteoarthritis of right knee 10/22/2019      LOS (days): 1  Geometric Mean LOS (GMLOS) (days): 3 50  Days to GMLOS:3     OBJECTIVE:  Risk of Unplanned Readmission Score: 12         Current admission status: Inpatient   Preferred Pharmacy:   [facility] [address] [place] [address]  Phone: [phone] Fax: [phone] [address]  Phone: [phone] Fax: [phone]    Primary Care Provider: Jennifer Maddox MD    Primary Insurance: MEDICARE  Secondary Insurance: AARP    DISCHARGE DETAILS:      CM discussed goals of care with primary provider based on scores of Goals of Care systems list

## 2022-04-22 NOTE — ED PROVIDER NOTES
History  Chief Complaint   Patient presents with    Chest Pain     Patient c/o chest [ain and fever at home  History provided by:  Patient   used: No      Patient is a 8-year-old female presenting to emergency department with abdominal pain and fever at home  Nausea and vomiting at home  No diarrhea  Daughter states last diaper change urine smelled bad  No rashes  No cough  No congestion  No sick contacts  Patient awake and alert  Denies any pain at this time  MDM septic evaluation, antibiotics, will need admission to hospital      ECG shows rate of 95, sinus, right bundle branch block, nonspecific ST and T-waves throughout, no significant changes from previous EKG, independently interpreted by me    No pneumonia on chest x-ray  Spoke with daughter and son-in-law, takes Tylenol daily, at most 4 tablets per day, usually 3 times per day  Prior to Admission Medications   Prescriptions Last Dose Informant Patient Reported? Taking? Control Gel Formula Dressing (DuoDERM CGF Extra Thin) MISC   No Yes   Sig: Apply to affected area every 3 days   Multiple Vitamins-Minerals (CENTRUM SILVER PO)  Other (Specify) Yes Yes   Sig: Take 1 tablet by mouth daily   amLODIPine (NORVASC) 5 mg tablet   No Yes   Sig: TAKE 1 TABLET(5 MG) BY MOUTH DAILY   lisinopril (ZESTRIL) 20 mg tablet   No Yes   Sig: TAKE 1 TABLET(20 MG) BY MOUTH TWICE DAILY   nystatin (MYCOSTATIN) cream   No Yes   Sig: Apply topically 2 (two) times a day   sitaGLIPtin-metFORMIN (JANUMET)  MG per tablet   No Yes   Sig: Take 1 tablet by mouth daily after dinner   verapamil (VERELAN PM) 180 MG 24 hr capsule   No Yes   Sig: TAKE 1 CAPSULE(180 MG) BY MOUTH DAILY      Facility-Administered Medications: None       Past Medical History:   Diagnosis Date    Diabetes mellitus ([facility] [address] )     Hypertension     Macular degeneration        History reviewed  No pertinent surgical history  History reviewed   No pertinent family history  I have reviewed and agree with the history as documented  E-Cigarette/Vaping     E-Cigarette/Vaping Substances     Social History     Tobacco Use    Smoking status: Never Smoker    Smokeless tobacco: Not on file   Substance Use Topics    Alcohol use: No    Drug use: No       Review of Systems   Constitutional: Positive for fever  Negative for chills and diaphoresis  HENT: Negative for congestion and sore throat  Respiratory: Negative for cough, shortness of breath, wheezing and stridor  Cardiovascular: Negative for chest pain, palpitations and leg swelling  Gastrointestinal: Positive for abdominal pain, nausea and vomiting  Negative for blood in stool and diarrhea  Genitourinary: Negative for dysuria, frequency and urgency  Musculoskeletal: Negative for neck pain and neck stiffness  Skin: Negative for pallor and rash  Neurological: Negative for dizziness, syncope, weakness, light-headedness and headaches  All other systems reviewed and are negative  Physical Exam  Physical Exam  Vitals reviewed  Constitutional:       Appearance: She is well-developed  HENT:      Head: Normocephalic and atraumatic  Eyes:      Pupils: Pupils are equal, round, and reactive to light  Cardiovascular:      Rate and Rhythm: Normal rate and regular rhythm  Heart sounds: Normal heart sounds  Pulmonary:      Effort: Pulmonary effort is normal  No respiratory distress  Breath sounds: Normal breath sounds  Abdominal:      General: Bowel sounds are normal       Palpations: Abdomen is soft  Tenderness: There is abdominal tenderness  Comments: Generalized tenderness   Musculoskeletal:         General: Normal range of motion  Cervical back: Neck supple  Right lower leg: No tenderness  No edema  Left lower leg: No tenderness  No edema  Skin:     General: Skin is warm and dry  Capillary Refill: Capillary refill takes less than 2 seconds     Neurological: General: No focal deficit present  Mental Status: She is alert and oriented to person, place, and time           Vital Signs  ED Triage Vitals [04/21/22 1947]   Temperature Pulse Respirations Blood Pressure SpO2   (!) 101 7 °F (38 7 °C) 98 18 143/66 95 %      Temp Source Heart Rate Source Patient Position - Orthostatic VS BP Location FiO2 (%)   Oral Monitor Lying Left arm --      Pain Score       5           Vitals:    04/25/22 0710 04/25/22 1600 04/25/22 2221 04/26/22 0650   BP: 158/80 151/67 157/66 163/89   Pulse: 88 81 93 83   Patient Position - Orthostatic VS:  Sitting           Visual Acuity      ED Medications  Medications   amLODIPine (NORVASC) tablet 5 mg (5 mg Oral Given 4/26/22 1009)   lisinopril (ZESTRIL) tablet 20 mg (20 mg Oral Given 4/26/22 1010)   verapamil (CALAN-SR) CR tablet 180 mg (180 mg Oral Given 4/26/22 1010)   enoxaparin (LOVENOX) subcutaneous injection 30 mg (30 mg Subcutaneous Given 4/26/22 1011)   insulin lispro (HumaLOG) 100 units/mL subcutaneous injection 1-5 Units (1 Units Subcutaneous Not Given 4/26/22 1105)   amoxicillin-clavulanate (AUGMENTIN) 500-125 mg per tablet 1 tablet (1 tablet Oral Given 4/26/22 1010)   acetaminophen (FOR EMS ONLY) (TYLENOL) oral suspension 650 mg (0 mg Does not apply Given to EMS 4/21/22 1936)   ceftriaxone (ROCEPHIN) 1 g/50 mL in dextrose IVPB (0 mg Intravenous Stopped 4/21/22 2056)   iohexol (OMNIPAQUE) 350 MG/ML injection (SINGLE-DOSE) 100 mL (100 mL Intravenous Given 4/21/22 2119)   sodium chloride 0 9 % bolus 1,500 mL (0 mL Intravenous Stopped 4/21/22 2252)   acetylcysteine (ACETADOTE) 9,015 mg in dextrose 5 % 200 mL IVPB (9,015 mg Intravenous New Bag 4/22/22 0008)   acetylcysteine (ACETADOTE) 3,005 mg in dextrose 5 % 500 mL IVPB (3,005 mg Intravenous New Bag 4/22/22 0152)   acetylcysteine (ACETADOTE) 6,010 mg in dextrose 5 % 1,000 mL IVPB (6,010 mg Intravenous New Bag 4/22/22 0613)   potassium chloride (K-DUR,KLOR-CON) CR tablet 40 mEq (40 mEq Oral Given 4/23/22 1823)       Diagnostic Studies  Results Reviewed     Procedure Component Value Units Date/Time    Blood culture #1 [[id number]]  (Abnormal)  (Susceptibility) Collected: 04/21/22 2021    Lab Status: Preliminary result Specimen: Blood from Arm, Right Updated: 04/25/22 1045     Blood Culture Escherichia coli     Gram Stain Result Gram negative rods    Susceptibility     Escherichia coli (1)     Antibiotic Interpretation Microscan Method Status    ZID Performed  Yes HENRY Final                   Blood culture #2 [[id number]]  (Abnormal)  (Susceptibility) Collected: 04/21/22 2021    Lab Status: Preliminary result Specimen: Blood from Arm, Left Updated: 04/25/22 1042     Blood Culture Escherichia coli     Gram Stain Result Gram negative rods    Susceptibility     Escherichia coli (1)     Antibiotic Interpretation Microscan   Method Status    Ampicillin ($$) Susceptible <=8 00 ug/ml HENRY Preliminary    Aztreonam ($$$)  Susceptible <=4 ug/ml HENRY Preliminary    Cefazolin ($) Susceptible <=2 00 ug/ml HENRY Preliminary    Ciprofloxacin ($)  Susceptible <=0 25 ug/ml HENRY Preliminary    Gentamicin ($$) Susceptible <=2 ug/ml HENRY Preliminary    Levofloxacin ($) Susceptible <=0 50 ug/ml HENRY Preliminary    Tetracycline Susceptible <=4 ug/ml HENRY Preliminary    Tobramycin ($) Susceptible <=2 ug/ml HENRY Preliminary    Trimethoprim + Sulfamethoxazole ($$$) Susceptible <=0 5/9 5 ug/ml HENRY Preliminary                   Blood Culture Identification Panel [[id number]]  (Abnormal) Collected: 04/21/22 2021    Lab Status: Preliminary result Specimen: Blood from Arm, Left Updated: 04/24/22 6978     Escherichia coli Detected    Narrative:      Culture and susceptiblity to follow      Hemoglobin A1c w/EAG Estimation (Orders if not completed within the last 90 days) [[id number]] Collected: 04/21/22 1954    Lab Status: Final result Specimen: Blood from Arm, Right Updated: 04/22/22 0948     Hemoglobin A1C 5 5 %       mg/dl     UA w Reflex to Microscopic w Reflex to Culture [[id number]]  (Abnormal) Collected: 04/22/22 0237    Lab Status: Final result Specimen: Urine, Other Updated: 04/22/22 0244     Color, UA Yellow     Clarity, UA Clear     Specific Gravity, UA <=1 005     pH, UA 7 0     Leukocytes, UA Negative     Nitrite, UA Negative     Protein, UA Trace mg/dl      Glucose, UA Negative mg/dl      Ketones, UA 40 (2+) mg/dl      Urobilinogen, UA 1 0 E U /dl      Bilirubin, UA Negative     Blood, UA Trace-lysed    Bilirubin, direct [[id number]]  (Abnormal) Collected: 04/21/22 2236    Lab Status: Final result Specimen: Blood from Arm, Left Updated: 04/22/22 0146     Bilirubin, Direct 0 43 mg/dL     HS Troponin I 4hr [[id number]]  (Normal) Collected: 04/22/22 0014    Lab Status: Final result Specimen: Blood from Arm, Left Updated: 04/22/22 0045     hs TnI 4hr 25 ng/L      Delta 4hr hsTnI 6 ng/L     Lactic acid, plasma [[id number]]  (Normal) Collected: 04/22/22 0014    Lab Status: Final result Specimen: Blood from Arm, Left Updated: 04/22/22 0040     LACTIC ACID 1 3 mmol/L     Narrative:      Result may be elevated if tourniquet was used during collection  Salicylate level [[id number]]  (Abnormal) Collected: 04/21/22 2236    Lab Status: Final result Specimen: Blood from Arm, Left Updated: 46/22/07 9525     Salicylate Lvl <3 mg/dL     Acetaminophen level-If concentration is detectable, please discuss with medical  on call   [[id number]]  (Normal) Collected: 04/21/22 2236    Lab Status: Final result Specimen: Blood from Arm, Left Updated: 04/21/22 2259     Acetaminophen Level 15 8 ug/mL     Ethanol [[id number]]  (Normal) Collected: 04/21/22 2236    Lab Status: Final result Specimen: Blood from Arm, Left Updated: 04/21/22 2257     Ethanol Lvl <3 mg/dL     CK (with reflex to MB) [[id number]]  (Normal) Collected: 04/21/22 1954    Lab Status: Final result Specimen: Blood from Arm, Right Updated: 04/21/22 2245     Total CK 82 U/L     Protime-INR [[id number]]  (Normal) Collected: 04/21/22 1954    Lab Status: Final result Specimen: Blood from Arm, Right Updated: 04/21/22 2240     Protime 14 1 seconds      INR 1 09    APTT [[id number]]  (Normal) Collected: 04/21/22 1954    Lab Status: Final result Specimen: Blood from Arm, Right Updated: 04/21/22 2240     PTT 24 seconds     Lactic acid 2 Hours [[id number]]  (Abnormal) Collected: 04/21/22 2158    Lab Status: Final result Specimen: Blood from Arm, Left Updated: 04/21/22 2237     LACTIC ACID 3 3 mmol/L     Narrative:      Result may be elevated if tourniquet was used during collection  HS Troponin I 2hr [[id number]]  (Normal) Collected: 04/21/22 2158    Lab Status: Final result Specimen: Blood from Arm, Left Updated: 04/21/22 2233     hs TnI 2hr 27 ng/L      Delta 2hr hsTnI 8 ng/L     Lactic acid [[id number]]  (Abnormal) Collected: 04/21/22 2021    Lab Status: Final result Specimen: Blood from Arm, Right Updated: 04/21/22 2120     LACTIC ACID 5 6 mmol/L     Narrative:      Result may be elevated if tourniquet was used during collection  COVID/FLU/RSV - 2 hour TAT [[id number]]  (Normal) Collected: 04/21/22 2021    Lab Status: Final result Specimen: Nares from Nose Updated: 04/21/22 2113     SARS-CoV-2 Negative     INFLUENZA A PCR Negative     INFLUENZA B PCR Negative     RSV PCR Negative    Narrative:      FOR PEDIATRIC PATIENTS - copy/paste COVID Guidelines URL to browser: [email or web address]  [email or web address]    SARS-CoV-2 assay is a Nucleic Acid Amplification assay intended for the  qualitative detection of nucleic acid from SARS-CoV-2 in nasopharyngeal  swabs  Results are for the presumptive identification of SARS-CoV-2 RNA  Positive results are indicative of infection with SARS-CoV-2, the virus  causing COVID-19, but do not rule out bacterial infection or co-infection  with other viruses   Laboratories within the [place] and its  territories are required to report all positive results to the appropriate  public health authorities  Negative results do not preclude SARS-CoV-2  infection and should not be used as the sole basis for treatment or other  patient management decisions  Negative results must be combined with  clinical observations, patient history, and epidemiological information  This test has not been FDA cleared or approved  This test has been authorized by FDA under an Emergency Use Authorization  (EUA)  This test is only authorized for the duration of time the  declaration that circumstances exist justifying the authorization of the  emergency use of an in vitro diagnostic tests for detection of SARS-CoV-2  virus and/or diagnosis of COVID-19 infection under section 564(b)(1) of  the Act, 21 U  S C  366FLR-8(H)(8), unless the authorization is terminated  or revoked sooner  The test has been validated but independent review by FDA  and CLIA is pending  Test performed using [employer] GeneXpert: This RT-PCR assay targets N2,  a region unique to SARS-CoV-2  A conserved region in the E-gene was chosen  for pan-Sarbecovirus detection which includes SARS-CoV-2      CBC and differential [[id number]]  (Abnormal) Collected: 04/21/22 1954    Lab Status: Final result Specimen: Blood from Arm, Right Updated: 04/21/22 2049     WBC 11 06 Thousand/uL      RBC 3 58 Million/uL      Hemoglobin 11 0 g/dL      Hematocrit 34 8 %      MCV 97 fL      MCH 30 7 pg      MCHC 31 6 g/dL      RDW 13 3 %      MPV 9 1 fL      Platelets 559 Thousands/uL      nRBC 0 /100 WBCs      Neutrophils Relative 93 %      Immat GRANS % 0 %      Lymphocytes Relative 2 %      Monocytes Relative 5 %      Eosinophils Relative 0 %      Basophils Relative 0 %      Neutrophils Absolute 10 28 Thousands/µL      Immature Grans Absolute 0 04 Thousand/uL      Lymphocytes Absolute 0 18 Thousands/µL      Monocytes Absolute 0 54 Thousand/µL      Eosinophils Absolute 0 00 Thousand/µL      Basophils Absolute 0 02 Thousands/µL     Narrative: This is an appended report  These results have been appended to a previously verified report  HS Troponin 0hr (reflex protocol) [[id number]]  (Normal) Collected: 04/21/22 1954    Lab Status: Final result Specimen: Blood from Arm, Right Updated: 04/21/22 2031     hs TnI 0hr 19 ng/L     Comprehensive metabolic panel [[id number]]  (Abnormal) Collected: 04/21/22 1954    Lab Status: Final result Specimen: Blood from Arm, Right Updated: 04/21/22 2030     Sodium 140 mmol/L      Potassium 3 8 mmol/L      Chloride 104 mmol/L      CO2 22 mmol/L      ANION GAP 14 mmol/L      BUN 26 mg/dL      Creatinine 0 81 mg/dL      Glucose 136 mg/dL      Calcium 8 5 mg/dL      Corrected Calcium 9 3 mg/dL      AST 2,363 U/L      ALT 1,507 U/L      Alkaline Phosphatase 666 U/L      Total Protein 6 7 g/dL      Albumin 3 0 g/dL      Total Bilirubin 1 01 mg/dL      eGFR 58 ml/min/1 73sq m     Narrative:      [place] guidelines for Chronic Kidney Disease (CKD):     Stage 1 with normal or high GFR (GFR > 90 mL/min/1 73 square meters)    Stage 2 Mild CKD (GFR = 60-89 mL/min/1 73 square meters)    Stage 3A Moderate CKD (GFR = 45-59 mL/min/1 73 square meters)    Stage 3B Moderate CKD (GFR = 30-44 mL/min/1 73 square meters)    Stage 4 Severe CKD (GFR = 15-29 mL/min/1 73 square meters)    Stage 5 End Stage CKD (GFR <15 mL/min/1 73 square meters)  Note: GFR calculation is accurate only with a steady state creatinine                 US right upper quadrant   Final Result by Josue Galarza MD (04/22 6920)      1  Limited visualization of the liver  There appear to be ring down artifacts throughout the liver which raise the possibility of pneumobilia  Suggest further evaluation with contrast-enhanced CT  2   Common bile duct measures 8 mm which can be within normal range for patient's age  Additionally this appears grossly similar to prior CT 6/7/2015    No sonographic evidence of choledocholithiasis  The study was marked in [facility] for immediate notification  Workstation performed: [id number]         CT abdomen pelvis with contrast   Final Result by Afia Maharaj MD (04/21 2212)      No evidence of acute intra-abdominal or pelvic pathology  Workstation performed: [id number]         XR chest 1 view portable   ED Interpretation by Nataly Flanagan MD (04/21 2113)   No acute findings      Final Result by Radu Garrison DO (04/22 0778)      No acute cardiopulmonary disease  Workstation performed: [id number]                    Procedures  Procedures         ED Course  ED Course as of 04/26/22 1415   Thu Apr 21, 2022 2126 Thirty per kilos bolus given based on ideal body weight   2246 Lactic acid improving   2328 Spoke with poison control, Dr Nicole Solano, recommends NAC                               SBIRT 20yo+      Most Recent Value   SBIRT (23 yo +)    In order to provide better care to our patients, we are screening all of our patients for alcohol and drug use  Would it be okay to ask you these screening questions? Yes Filed at: 04/21/2022 1952   Initial Alcohol Screen: US AUDIT-C     1  How often do you have a drink containing alcohol? 0 Filed at: 04/21/2022 1952   2  How many drinks containing alcohol do you have on a typical day you are drinking? 0 Filed at: 04/21/2022 1952   3b  FEMALE Any Age, or MALE 65+: How often do you have 4 or more drinks on one occassion? 0 Filed at: 04/21/2022 1952   Audit-C Score 0 Filed at: 04/21/2022 1952   HONG: How many times in the past year have you    Used an illegal drug or used a prescription medication for non-medical reasons?  Never Filed at: 04/21/2022 1952                    MDM    Disposition  Final diagnoses:   Sepsis ([address] )   Elevated LFTs     Time reflects when diagnosis was documented in both MDM as applicable and the Disposition within this note     Time User Action Codes Description Comment 4/21/2022 11:40 PM Tadevalexsandravishal Teresa Add [A41 9] Sepsis ([facility] [address] )     4/21/2022 11:40 PM Tadjc Teresa Add [R79 89] Elevated LFTs     4/22/2022  3:05 AM Liliam Arias Add [T39 1X1A] Toxic effect of acetaminophen, accidental or unintentional, initial encounter     4/22/2022  8:34 AM Kelly Overcast Add [R74 01] Transaminitis     4/23/2022  8:38 AM Kelly Overcast Add [R78 81] Bacteremia     4/24/2022 10:09 AM Pospisil, Aslhey Eastman Add [I42 9] Cardiomyopathy, unspecified type [facility])       ED Disposition     ED Disposition Condition Date/Time Comment    Admit Stable Thu Apr 21, 2022 11:40 PM Case was discussed with medicine ap and the patient's admission status was agreed to be Admission Status: inpatient status to the service of Dr Meet Ford          Follow-up Information     Follow up With Specialties Details Why Contact Info    Danni Jenkins MD Family Medicine Follow up in 1 week(s)  [address] [place] [address]  [phone]            Current Discharge Medication List      START taking these medications    Details   amoxicillin-clavulanate (AUGMENTIN) 500-125 mg per tablet Take 1 tablet by mouth every 8 (eight) hours for 4 days  Qty: 13 tablet, Refills: 0    Associated Diagnoses: Sepsis ([facility] )         CONTINUE these medications which have NOT CHANGED    Details   amLODIPine (NORVASC) 5 mg tablet TAKE 1 TABLET(5 MG) BY MOUTH DAILY  Qty: 90 tablet, Refills: 3    Associated Diagnoses: Essential hypertension      Control Gel Formula Dressing (DuoDERM CGF Extra Thin) MISC Apply to affected area every 3 days  Qty: 10 each, Refills: 2    Associated Diagnoses: Pressure injury of left calf, stage 2 (HCC)      lisinopril (ZESTRIL) 20 mg tablet TAKE 1 TABLET(20 MG) BY MOUTH TWICE DAILY  Qty: 180 tablet, Refills: 3    Associated Diagnoses: Essential hypertension      Multiple Vitamins-Minerals (CENTRUM SILVER PO) Take 1 tablet by mouth daily      nystatin (MYCOSTATIN) cream Apply topically 2 (two) times a day  Qty: 30 g, Refills: 1    Associated Diagnoses: Perianal dermatitis      sitaGLIPtin-metFORMIN (JANUMET)  MG per tablet Take 1 tablet by mouth daily after dinner  Qty: 90 tablet, Refills: 3    Associated Diagnoses: Type 2 diabetes mellitus without complication, without long-term current use of insulin (HCC)      verapamil (VERELAN PM) 180 MG 24 hr capsule TAKE 1 CAPSULE(180 MG) BY MOUTH DAILY  Qty: 90 capsule, Refills: 3    Associated Diagnoses: Essential hypertension             Outpatient Discharge Orders   CBC and differential   Standing Status: Future Standing Exp  Date: 04/26/23     Comprehensive metabolic panel   Standing Status: Future Standing Exp   Date: 04/26/23       PDMP Review     None          ED Provider  Electronically Signed by           Swati Martinez MD  04/26/22 5773

## 2022-04-22 NOTE — CASE MANAGEMENT
Case Management Assessment & Discharge Planning Note    Patient name Paul Radford  Location S /S -01 MRN [id number]  : 1918 Date 2022       Current Admission Date: 2022  Current Admission Diagnosis:Sepsis [facility])   Patient Active Problem List    Diagnosis Date Noted    Acetaminophen toxicity 2022    Transaminitis 2022    Sepsis ([facility] ) 2022    Paroxysmal atrial fibrillation ([facility] ) 10/16/2020    Essential hypertension 10/22/2019    Primary osteoarthritis of left hip 10/22/2019    Ambulatory dysfunction 10/22/2019    Advanced age 10/22/2019    Type 2 diabetes mellitus without complication, without long-term current use of insulin ([facility] ) 10/22/2019    ARMD (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 degeneration) 10/22/2019    Left sided lacunar infarction ([facility] ) 10/22/2019    Primary osteoarthritis of right knee 10/22/2019      LOS (days): 1  Geometric Mean LOS (GMLOS) (days): 3 50  Days to GMLOS:2 8     OBJECTIVE:    Risk of Unplanned Readmission Score: 12         Current admission status: Inpatient  Referral Reason: SNF, Initial,Rehab    Preferred Pharmacy:   [facility] [address] [place] [address]  Phone: [phone] Fax: [address] [address]  Phone: [phone] Fax: [phone]    Primary Care Provider: Shashi Chandler MD    Primary Insurance: MEDICARE  Secondary Insurance: [facility]    ASSESSMENT:  [address], [facility] - Daughter   Primary Phone: [phone] (Mobile)               Advance Directives  Does patient have a Health Care POA?: Yes  Does patient have Advance Directives?: Yes  Advance Directives: Living will,Power of  for health care,Power of  for finance  Primary Contact: daughter       Readmission Root Cause  30 Day Readmission: No    Patient Information  Admitted from[de-identified] Home  Mental Status: Alert  During Assessment patient was accompanied by: Daughter  Assessment information provided by[de-identified] Daughter  Primary Caregiver: Family  Caregiver's Name[de-identified] Albino Dom Relationship to Patient[de-identified] Family Member  Support Systems: [facility],[address] of Residence: [address] [facility] do you live in?: [facility] entry access options   Select all that apply : Stairs  Number of steps to enter home : 3  Do the steps have railings?: Yes  Type of Current Residence: 2 story home  Upon entering residence, is there a bedroom on the main floor (no further steps)?: Yes  Upon entering residence, is there a bathroom on the main floor (no further steps)?: Yes  In the last 12 months, was there a time when you were not able to pay the mortgage or rent on time?: No  In the last 12 months, was there a time when you did not have a steady place to sleep or slept in a shelter (including now)?: No  Living Arrangements: Lives w/ Daughter  Is patient a ?: No    Activities of Daily Living Prior to Admission  Functional Status: Assistance  Completes ADLs independently?: No  Level of ADL dependence: Assistance  Ambulates independently?: No  Level of ambulatory dependence: Assistance  Does patient use assisted devices?: Yes  Assisted Devices (DME) used: Bedside Commode,Walker,Shower Chair  Does patient currently own DME?: Yes  What DME does the patient currently own?: Bedside Commode,Shower Chair,Walker  Does patient have a history of Outpatient Therapy (PT/OT)?: No  Does the patient have a history of Short-Term Rehab?: Yes ([facility])  Does patient have a history of HHC?: Yes  Does patient currently have [facility]?: No         Patient Information Continued  Income Source: Pension/custodial  Does patient have prescription coverage?: Yes  Within the past 12 months, you worried that your food would run out before you got the money to buy more : Never true  Within the past 12 months, the food you bought just didnt last and you didnt have money to get more : Never true  Does patient receive dialysis treatments?: No  Does patient have a history of substance abuse?: No  Does patient have a history of Mental Health Diagnosis?: No         Means of Transportation  Means of Transport to Appts[de-identified] Family transport  In the past 12 months, has lack of transportation kept you from medical appointments or from getting medications?: No  In the past 12 months, has lack of transportation kept you from meetings, work, or from getting things needed for daily living?: No        DISCHARGE DETAILS:    Discharge planning discussed with[de-identified] luis manuel and XU  Freedom of Choice: Yes  Comments - Freedom of Choice: CM reviewed the options related to DC once PT and OT evaluate pt  CM reviewed rehab options as well as LTC possibilities  [employer]rol information has been given as well in the event family decides they would like to bring pt home at any time and need the assistance  CM reviewed with the family the process for rehab and what CM does to assist them  They feel as though they are not able to care for pt appropriately at this time and are considering LTC  CM will continue to follow to assist with needs and planning  CM reviewed with the family evaluations will be completed by therapy and we will go from there  One step at a time  Emotional support provided to daughter and XU  CM passed on to PT requesting she be a priority in the morning to be seen  CM also wrote in Sticky Note that family prefers no one speak with pt regarding DCP unless they are present      CM contacted family/caregiver?: Yes  Were Treatment Team discharge recommendations reviewed with patient/caregiver?: Yes  Did patient/caregiver verbalize understanding of patient care needs?: Yes  Were patient/caregiver advised of the risks associated with not following Treatment Team discharge recommendations?: Yes    Contacts  Patient Contacts: daughter  Relationship to Patient[de-identified] Family  Contact Method: In Person  Reason/Outcome: Continuity of [address]         Is the patient interested in [place] [address] at discharge?: No    DME Referral Provided  Referral made for DME?: No                 Discharge Destination Plan[de-identified] Short Term Rehab  Transport at Discharge : S Ambulance  Dispatcher Contacted:  No                 Transfer Mode: Stretcher  Accompanied by: EMS personnel  Transfer Equipment: S devices

## 2022-04-22 NOTE — CONSULTS
PHONE [id number] Toxicology  Howard Weaver 80 y o  female MRN: [id number]  Unit/Bed#: S -01 Encounter: [id number]      Reason for Consult / Principal Problem: Detectable acetaminophen concentration, transaminitis    Inpatient consult to Toxicology  Consult performed by: Marta Butt MD  Consult ordered by: Elvira Miller DO        04/22/22      ASSESSMENT:  1) Sepsis  2) Abdominal pain  3) Transaminitis  4) Detectable acetaminophen concentration    DISCUSSION/ RECOMMENDATIONS:  I think that it is unlikely that the patient's transaminitis is secondary to acetaminophen toxicity  Based on EMS replenishment logs it appears the patient was given acetaminophen by EMS, which would in part or in whole explain her concentration of 15 8 ug/mL on presentation  History of taking "up to 4 tabs daily" would also not generally be consistent with expectation of developing chronic acetaminophen toxicity, even with the patient's age and likely poor nutritional status  INR is normal which argues against chronic acetaminophen toxicity unless this is very early in the process  This picture could theoretically be consistent with a significant acute overdose multiple hours prior to presentation, but that does not seem plausible in this case  Nonetheless, as there is no harm expected from N-acetylcysteine (NAC), as it is expected to be beneficial on the low chance this is early chronic acetaminophen toxicity, and as there is some data suggesting it is beneficial for patients with acute liver injury in general, I would recommend that it be continued at this time  I have ordered repeat acetaminophen concentration and CMP to trend  Given the low likelihood that this is acetaminophen toxicity, I would recommend continuing to consider other etiologies  Recommend considering GI consult          For further questions, please call [facility]  Service or Patient Access Center to reach the medical  on call            Hx and PE limited by the dynamics of a phone consultation  I have not personally interviewed or evaluated the patient, but only advised based on the information provided to me  Primary provider is responsible for all clinical decisions  Pertinent history, physical exam and clinical findings and course discussed: Tashia Trotter is a 8 y o  year old female who presented with abdominal pain and findings consistent with sepsis  Had detectable acetaminophen concentration last night, but does appear she was given acetaminophen by EMS  History provided was that she uses "up to 4 tabs" of acetaminophen daily (max 2 grams depending on strength)  Review of systems and physical exam not performed by me  Historical Information   Past Medical History:   Diagnosis Date    Diabetes mellitus ([facility] )     Hypertension     Macular degeneration      History reviewed  No pertinent surgical history  Social History   Social History     Substance and Sexual Activity   Alcohol Use No     Social History     Substance and Sexual Activity   Drug Use No     Social History     Tobacco Use   Smoking Status Never Smoker   Smokeless Tobacco Not on file     History reviewed  No pertinent family history  Prior to Admission medications    Medication Sig Start Date End Date Taking?  Authorizing Provider   amLODIPine (NORVASC) 5 mg tablet TAKE 1 TABLET(5 MG) BY MOUTH DAILY 10/11/21  Yes Satnam Walker MD   Control Gel Formula Dressing (DuoDERM CGF Extra Thin) MISC Apply to affected area every 3 days 1/15/21  Yes Satnam Walker MD   lisinopril (ZESTRIL) 20 mg tablet TAKE 1 TABLET(20 MG) BY MOUTH TWICE DAILY 7/27/21  Yes Satnam Walker MD   Multiple Vitamins-Minerals (CENTRUM SILVER PO) Take 1 tablet by mouth daily   Yes Historical Provider, MD   nystatin (MYCOSTATIN) cream Apply topically 2 (two) times a day 5/15/20  Yes Satnam Walker MD   sitaGLIPtin-metFORMIN (JANUMET)  MG per tablet Take 1 tablet by mouth daily after dinner 4/4/22  Yes Sudarshan Thomas MD   verapamil (VERELAN PM) 180 MG 24 hr capsule TAKE 1 CAPSULE(180 MG) BY MOUTH DAILY 10/11/21  Yes Sudarshan Thomas MD       Current Facility-Administered Medications   Medication Dose Route Frequency    acetylcysteine (ACETADOTE) 6,010 mg in dextrose 5 % 1,000 mL IVPB  100 mg/kg Intravenous Once    amLODIPine (NORVASC) tablet 5 mg  5 mg Oral Daily    cefTRIAXone (ROCEPHIN) 1,000 mg in dextrose 5 % 50 mL IVPB  1,000 mg Intravenous Q24H    enoxaparin (LOVENOX) subcutaneous injection 30 mg  30 mg Subcutaneous Daily    insulin lispro (HumaLOG) 100 units/mL subcutaneous injection 1-5 Units  1-5 Units Subcutaneous TID AC    lisinopril (ZESTRIL) tablet 20 mg  20 mg Oral BID    sodium chloride 0 9 % infusion  75 mL/hr Intravenous Continuous    verapamil (CALAN-SR) CR tablet 180 mg  180 mg Oral Daily       No Known Allergies    Objective       Intake/Output Summary (Last 24 hours) at 4/22/2022 0726  Last data filed at 4/22/2022 8695  Gross per 24 hour   Intake 1550 ml   Output 500 ml   Net 1050 ml       Invasive Devices:   Peripheral IV 04/21/22 Right;Lateral Antecubital (Active)   Site Assessment WDL; Clean;Dry 04/22/22 0300   Dressing Type Transparent 04/22/22 0300   Line Status Infusing 04/22/22 0300   Dressing Status Clean;Dry; Intact 04/22/22 0300       Peripheral IV 04/21/22 Left;Dorsal (posterior) Forearm (Active)   Site Assessment WDL; Clean;Dry 04/22/22 0300   Dressing Type Transparent 04/22/22 0300   Line Status Blood return noted; Flushed 04/22/22 0300   Dressing Status Dry; Intact; Clean 04/22/22 0300       Vitals   Vitals:    04/21/22 2345 04/22/22 0000 04/22/22 0030 04/22/22 0129   BP: 115/58 116/59 131/62 158/73   TempSrc:       Pulse: 82 78 78 89   Resp: 18 18 18    Patient Position - Orthostatic VS: Lying Lying Lying    Temp:    98 9 °F (37 2 °C)         Lab Results: I have personally reviewed pertinent reports        Labs:  Results from last 7 days   Lab Units 04/22/22  0522 04/22/22  0521 04/22/22  0521   WBC Thousand/uL  --   --  15 47*   HEMOGLOBIN g/dL  --   --  10 2*   HEMATOCRIT %  --   --  32 5*   PLATELETS Thousands/uL 349   < > 332   NEUTROS PCT %  --   --  87*   LYMPHS PCT %  --   --  5*   MONOS PCT %  --   --  7    < > = values in this interval not displayed  Results from last 7 days   Lab Units 04/21/22 1954   POTASSIUM mmol/L 3 8   CHLORIDE mmol/L 104   CO2 mmol/L 22   BUN mg/dL 26*   CREATININE mg/dL 0 81   CALCIUM mg/dL 8 5   ALK PHOS U/L 666*   ALT U/L 1,507*   AST U/L 2,363*      Results from last 7 days   Lab Units 04/21/22 1954   INR  1 09   PTT seconds 24     Results from last 7 days   Lab Units 04/22/22  0014 04/21/22  2158 04/21/22  2021   LACTIC ACID mmol/L 1 3 3 3* 5 6*     0   Lab Value Date/Time    TROPONINI <0 04 06/08/2015 0112         Results from last 7 days   Lab Units 04/21/22  2236   ACETAMINOPHEN LVL ug/mL 15 8   ETHANOL LVL mg/dL <3   SALICYLATE LVL mg/dL <3*     Invalid input(s): EXTPREGUR    Imaging Studies: I have personally reviewed pertinent reports  Counseling / Coordination of Care  Total time spent today 31 minutes  This was a phone consultation

## 2022-04-22 NOTE — QUICK NOTE
Repeat acetaminophen concentration undetectable as expected  AST improving, ALT stable  Recommend continuing NAC through end of 21 hour course, and it can then be discontinued  As noted in initial consult note, still low suspicion that this is acetaminophen toxicity and GI consultation could be considered  We will sign off at this time  Please call with any further questions or concerns

## 2022-04-22 NOTE — ASSESSMENT & PLAN NOTE
· Takes daily acetaminophen 4 tablets per day unknown dosage  · Serum acetaminophen level 15 8    Plan  · Continue acetylcysteine started in the ED  · Trend LFTs  · Consult toxicology

## 2022-04-22 NOTE — ASSESSMENT & PLAN NOTE
· Takes daily acetaminophen 4 tablets per day unknown dosage  · Serum acetaminophen level 15 8  Acetaminophen toxicity is unlikely given patient was only taking 2 g of Tylenol per day per GI    Plan  · Received 3 doses of acetylcysteine

## 2022-04-23 PROBLEM — I50.43 ACUTE ON CHRONIC COMBINED SYSTOLIC AND DIASTOLIC HF (HEART FAILURE) (HCC): Status: ACTIVE | Noted: 2022-04-23

## 2022-04-23 PROBLEM — I43 CARDIAC AMYLOIDOSIS (HCC): Status: ACTIVE | Noted: 2022-04-23

## 2022-04-23 PROBLEM — E85.4 CARDIAC AMYLOIDOSIS (HCC): Status: ACTIVE | Noted: 2022-04-23

## 2022-04-23 LAB
ALBUMIN SERPL BCP-MCNC: 2.8 G/DL (ref 3.5–5)
ALP SERPL-CCNC: 407 U/L (ref 46–116)
ALP SERPL-CCNC: 410 U/L (ref 46–116)
ALP SERPL-CCNC: 411 U/L (ref 46–116)
ALT SERPL W P-5'-P-CCNC: 866 U/L (ref 12–78)
ALT SERPL W P-5'-P-CCNC: 889 U/L (ref 12–78)
ALT SERPL W P-5'-P-CCNC: 889 U/L (ref 12–78)
ANION GAP SERPL CALCULATED.3IONS-SCNC: 9 MMOL/L (ref 4–13)
AST SERPL W P-5'-P-CCNC: 328 U/L (ref 5–45)
AST SERPL W P-5'-P-CCNC: 337 U/L (ref 5–45)
AST SERPL W P-5'-P-CCNC: 346 U/L (ref 5–45)
ATRIAL RATE: 95 BPM
BILIRUB DIRECT SERPL-MCNC: 0.12 MG/DL (ref 0–0.2)
BILIRUB DIRECT SERPL-MCNC: 0.12 MG/DL (ref 0–0.2)
BILIRUB SERPL-MCNC: 0.33 MG/DL (ref 0.2–1)
BILIRUB SERPL-MCNC: 0.34 MG/DL (ref 0.2–1)
BILIRUB SERPL-MCNC: 0.35 MG/DL (ref 0.2–1)
BUN SERPL-MCNC: 6 MG/DL (ref 5–25)
CALCIUM ALBUM COR SERPL-MCNC: 9.3 MG/DL (ref 8.3–10.1)
CALCIUM SERPL-MCNC: 8.3 MG/DL (ref 8.3–10.1)
CHLORIDE SERPL-SCNC: 106 MMOL/L (ref 100–108)
CMV IGG SERPL IA-ACNC: 2.7 U/ML (ref 0–0.59)
CMV IGM SERPL IA-ACNC: <30 AU/ML (ref 0–29.9)
CO2 SERPL-SCNC: 26 MMOL/L (ref 21–32)
CREAT SERPL-MCNC: 0.41 MG/DL (ref 0.6–1.3)
EBV NA IGG SER IA-ACNC: 85.7 U/ML (ref 0–17.9)
EBV VCA IGG SER IA-ACNC: 84.8 U/ML (ref 0–17.9)
EBV VCA IGM SER IA-ACNC: <36 U/ML (ref 0–35.9)
ERYTHROCYTE [DISTWIDTH] IN BLOOD BY AUTOMATED COUNT: 13.9 % (ref 11.6–15.1)
GFR SERPL CREATININE-BSD FRML MDRD: 83 ML/MIN/1.73SQ M
GLUCOSE SERPL-MCNC: 117 MG/DL (ref 65–140)
GLUCOSE SERPL-MCNC: 118 MG/DL (ref 65–140)
GLUCOSE SERPL-MCNC: 121 MG/DL (ref 65–140)
GLUCOSE SERPL-MCNC: 177 MG/DL (ref 65–140)
GLUCOSE SERPL-MCNC: 98 MG/DL (ref 65–140)
HCT VFR BLD AUTO: 32.6 % (ref 34.8–46.1)
HGB BLD-MCNC: 10.5 G/DL (ref 11.5–15.4)
INR PPP: 1.05 (ref 0.84–1.19)
INTERPRETATION: ABNORMAL
MCH RBC QN AUTO: 31 PG (ref 26.8–34.3)
MCHC RBC AUTO-ENTMCNC: 32.2 G/DL (ref 31.4–37.4)
MCV RBC AUTO: 96 FL (ref 82–98)
P AXIS: 59 DEGREES
PLATELET # BLD AUTO: 335 THOUSANDS/UL (ref 149–390)
PMV BLD AUTO: 9.4 FL (ref 8.9–12.7)
POTASSIUM SERPL-SCNC: 3 MMOL/L (ref 3.5–5.3)
PR INTERVAL: 160 MS
PROT SERPL-MCNC: 6.5 G/DL (ref 6.4–8.2)
PROTHROMBIN TIME: 13.7 SECONDS (ref 11.6–14.5)
QRS AXIS: 204 DEGREES
QRSD INTERVAL: 114 MS
QT INTERVAL: 378 MS
QTC INTERVAL: 475 MS
RBC # BLD AUTO: 3.39 MILLION/UL (ref 3.81–5.12)
SODIUM SERPL-SCNC: 141 MMOL/L (ref 136–145)
T WAVE AXIS: -1 DEGREES
VENTRICULAR RATE: 95 BPM
WBC # BLD AUTO: 10.6 THOUSAND/UL (ref 4.31–10.16)

## 2022-04-23 PROCEDURE — 93010 ELECTROCARDIOGRAM REPORT: CPT | Performed by: INTERNAL MEDICINE

## 2022-04-23 PROCEDURE — 85610 PROTHROMBIN TIME: CPT

## 2022-04-23 PROCEDURE — 80076 HEPATIC FUNCTION PANEL: CPT

## 2022-04-23 PROCEDURE — 80053 COMPREHEN METABOLIC PANEL: CPT

## 2022-04-23 PROCEDURE — 85027 COMPLETE CBC AUTOMATED: CPT

## 2022-04-23 PROCEDURE — 99232 SBSQ HOSP IP/OBS MODERATE 35: CPT | Performed by: INTERNAL MEDICINE

## 2022-04-23 PROCEDURE — 99223 1ST HOSP IP/OBS HIGH 75: CPT | Performed by: INTERNAL MEDICINE

## 2022-04-23 PROCEDURE — 82948 REAGENT STRIP/BLOOD GLUCOSE: CPT

## 2022-04-23 RX ORDER — POTASSIUM CHLORIDE 20 MEQ/1
40 TABLET, EXTENDED RELEASE ORAL 2 TIMES DAILY
Status: COMPLETED | OUTPATIENT
Start: 2022-04-23 | End: 2022-04-23

## 2022-04-23 RX ADMIN — LISINOPRIL 20 MG: 20 TABLET ORAL at 10:43

## 2022-04-23 RX ADMIN — ENOXAPARIN SODIUM 30 MG: 40 INJECTION SUBCUTANEOUS at 10:43

## 2022-04-23 RX ADMIN — CEFTRIAXONE 1000 MG: 1 INJECTION, POWDER, FOR SOLUTION INTRAMUSCULAR; INTRAVENOUS at 20:37

## 2022-04-23 RX ADMIN — AMLODIPINE BESYLATE 5 MG: 5 TABLET ORAL at 10:43

## 2022-04-23 RX ADMIN — VERAPAMIL HYDROCHLORIDE 180 MG: 180 TABLET ORAL at 10:45

## 2022-04-23 RX ADMIN — POTASSIUM CHLORIDE 40 MEQ: 1500 TABLET, EXTENDED RELEASE ORAL at 18:23

## 2022-04-23 RX ADMIN — LISINOPRIL 20 MG: 20 TABLET ORAL at 18:23

## 2022-04-23 RX ADMIN — POTASSIUM CHLORIDE 40 MEQ: 1500 TABLET, EXTENDED RELEASE ORAL at 10:43

## 2022-04-23 NOTE — ASSESSMENT & PLAN NOTE
Recent Labs     04/21/22  1954 04/22/22  0728 04/22/22  1606   AST 2,363* 1,410* 717*   ALT 1,507* 1,516* 1,126*   ALKPHOS 666* 517* 439*   TBILI 1 01* 0 37 0 29     Acetaminophen:  Daily use unknown dosage  Acetaminophen level 18 5    Imaging  · CT Abd/Pev:  Unremarkable  · U/S:  Pending  There is a mixed pattern in terms of the transaminitis  Status post acetylcysteine infusion in the emergency department  Etiologies:  Most likely secondary to hepatic infarction, versus hypoperfusion from sepsis  Need to rule out viral etiology     Plan:  · Continue to trend LFTs, INR  · GI on board  · Follow Hepatitis panel, HSV type 1, 2 PCR, CMV, EBV, HSV  · Pending Doppler ultrasound studies

## 2022-04-23 NOTE — ASSESSMENT & PLAN NOTE
1/2 E coli bacteremia  No clear source for now  ID on board possible MRCP  However risk versus benefit is being discussed with patient  Patient's family believes that if something is found with MRCP, with her advanced age, will it be worth doing anything  Patients family notified that patient is clinically improving  No more fever, white count trending down, blood pressure stable    Repeat cultures at 48 hours of antibiotic  Continue ceftriaxone  Follow sensitivities

## 2022-04-23 NOTE — ASSESSMENT & PLAN NOTE
POA: Sepsis as evidenced by fever 101 7, tachycardia 98, leukocytosis 11 06       Recent Labs     04/21/22 1954 04/22/22  0521 04/23/22  0828   WBC 11 06* 15 47* 10 60*     Recent Labs     04/21/22 2021 04/21/22  2158 04/22/22  0014   LACTICACID 5 6* 3 3* 1 3       /86 (BP Location: Right arm)   Pulse 95   Temp 99 1 °F (37 3 °C) (Oral)   Resp 18   Ht 5' 1" (1 549 m)   Wt 60 1 kg (132 lb 7 9 oz)   LMP  (LMP Unknown)   SpO2 95%   BMI 25 03 kg/m²     CT abdomen pelvis with contrast was unremarkable  Ultrasound right upper quadrant shows pneumobilia, normal common bile duct  Chest x-ray unremarkable  Urinalysis unremarkable  Patient had 1/2 E coli on blood cultures  Plan:   Abx:  Ceftriaxone 1 g Q 24 hours   Discontinue IV fluids   Two HIDA scan rule out acute cholecystitis given ultrasound findings   Continue to follow culture sensitivities   Post acute rehab was recommended: Reach notify case management

## 2022-04-23 NOTE — ASSESSMENT & PLAN NOTE
Wt Readings from Last 3 Encounters:   04/21/22 60 1 kg (132 lb 7 9 oz)   04/26/18 59 4 kg (131 lb)   02/21/17 61 3 kg (135 lb 2 3 oz)

## 2022-04-23 NOTE — PROGRESS NOTES
[facility]  Progress Note Laura King 7/19/1918, 80 y o  female MRN: [id number]  Unit/Bed#: S -01 Encounter: [id number]  Primary Care Provider: Danni Jenkins MD   Date and time admitted to hospital: 4/21/2022  7:24 PM    * Sepsis [facility])  Assessment & Plan  POA: Sepsis as evidenced by fever 101 7, tachycardia 98, leukocytosis 11 06       Recent Labs     04/21/22 1954 04/22/22  0521 04/23/22  0828   WBC 11 06* 15 47* 10 60*     Recent Labs     04/21/22 2021 04/21/22  2158 04/22/22  0014   LACTICACID 5 6* 3 3* 1 3       /86 (BP Location: Right arm)   Pulse 95   Temp 99 1 °F (37 3 °C) (Oral)   Resp 18   Ht 5' 1" (1 549 m)   Wt 60 1 kg (132 lb 7 9 oz)   LMP  (LMP Unknown)   SpO2 95%   BMI 25 03 kg/m²     CT abdomen pelvis with contrast was unremarkable  Ultrasound right upper quadrant shows pneumobilia, normal common bile duct  Chest x-ray unremarkable  Urinalysis unremarkable  Patient had 1/2 E coli on blood cultures  Plan:   Abx:  Ceftriaxone 1 g Q 24 hours   Discontinue IV fluids   Two HIDA scan rule out acute cholecystitis given ultrasound findings   Continue to follow culture sensitivities   Post acute rehab was recommended: Reach notify case management  Transaminitis  Assessment & Plan  Recent Labs     04/21/22 1954 04/22/22  0728 04/22/22  1606   AST 2,363* 1,410* 717*   ALT 1,507* 1,516* 1,126*   ALKPHOS 666* 517* 439*   TBILI 1 01* 0 37 0 29     Acetaminophen:  Daily use unknown dosage  Acetaminophen level 18 5    Imaging  · CT Abd/Pev:  Unremarkable  · U/S:  Pending  There is a mixed pattern in terms of the transaminitis  Status post acetylcysteine infusion in the emergency department  Etiologies:  Most likely secondary to hepatic infarction, versus hypoperfusion from sepsis  Need to rule out viral etiology     Plan:  · Continue to trend LFTs, INR  · GI on board  · Follow Hepatitis panel, HSV type 1, 2 PCR, CMV, EBV, HSV  · Pending Doppler ultrasound studies          Bacteremia  Assessment & Plan  1/2 E coli bacteremia  No clear source for now  ID on board possible MRCP  However risk versus benefit is being discussed with patient  Patient's family believes that if something is found with MRCP, with her advanced age, will it be worth doing anything  Patients family notified that patient is clinically improving  No more fever, white count trending down, blood pressure stable  Repeat cultures at 48 hours of antibiotic  Continue ceftriaxone  Follow sensitivities    Type 2 diabetes mellitus without complication, without long-term current use of insulin [facility])  Assessment & Plan  Lab Results   Component Value Date    HGBA1C 5 5 04/21/2022       Recent Labs     04/22/22  1611 04/22/22  2148 04/23/22  0731 04/23/22  1105   POCGLU 125 130 118 177*       Blood Sugar Average: Last 72 hrs:  · (P) 573 [id number]   Plan  · Hold Janumet  · Insulin sliding scale  · Hemoglobin A1c  Essential hypertension  Assessment & Plan  · Blood pressure acceptable  · Continue amlodipine 5 mg daily  · Continue verapamil 180 mg daily  · Continue lisinopril 20 mg b i d  VTE Pharmacologic Prophylaxis: VTE Score: 8 High Risk (Score >/= 5) - Pharmacological DVT Prophylaxis Ordered: enoxaparin (Lovenox)  Sequential Compression Devices Ordered  Patient Centered Rounds: I performed bedside rounds with nursing staff today  Discussions with Specialists or Other Care Team Provider:  Gastroenterology    Education and Discussions with Family / Patient: Updated  (daughter) at bedside  Current Length of Stay: 2 day(s)  Current Patient Status: Inpatient   Discharge Plan: Anticipate discharge in 24-48 hrs to home  Code Status: Level 1 - Full Code    Subjective:   Patient is noted to be comfortable, not in respiratory distress  No abdominal pain, no nausea no vomiting  Good appetite noted    No other subjective complaints noted    Objective: Vitals:   Temp (24hrs), Av 4 °F (36 9 °C), Min:97 7 °F (36 5 °C), Max:99 1 °F (37 3 °C)    Temp:  [97 7 °F (36 5 °C)-99 1 °F (37 3 °C)] 99 1 °F (37 3 °C)  HR:  [82-95] 95  Resp:  [18-20] 18  BP: (167-183)/(74-95) 167/86  SpO2:  [95 %] 95 %  Body mass index is 25 03 kg/m²  Input and Output Summary (last 24 hours): Intake/Output Summary (Last 24 hours) at 2022 1557  Last data filed at 2022 1507  Gross per 24 hour   Intake 260 ml   Output 2050 ml   Net -1790 ml       Physical Exam:   Physical Exam  Vitals and nursing note reviewed  Constitutional:       General: She is not in acute distress  Appearance: She is well-developed  She is not ill-appearing  HENT:      Head: Normocephalic and atraumatic  Nose: Nose normal       Mouth/Throat:      Mouth: Mucous membranes are moist    Eyes:      Conjunctiva/sclera: Conjunctivae normal    Cardiovascular:      Rate and Rhythm: Normal rate and regular rhythm  Heart sounds: No murmur heard  Pulmonary:      Effort: Pulmonary effort is normal  No respiratory distress  Breath sounds: Normal breath sounds  Abdominal:      Palpations: Abdomen is soft  Tenderness: There is no abdominal tenderness  Musculoskeletal:      Cervical back: Neck supple  Right lower leg: No edema  Left lower leg: No edema  Skin:     General: Skin is warm and dry  Capillary Refill: Capillary refill takes less than 2 seconds  Neurological:      General: No focal deficit present  Mental Status: She is alert and oriented to person, place, and time     Psychiatric:         Mood and Affect: Mood normal           Additional Data:     Labs:  Results from last 7 days   Lab Units 22  0828 22  0522 22  0521   WBC Thousand/uL 10 60*   < > 15 47*   HEMOGLOBIN g/dL 10 5*   < > 10 2*   HEMATOCRIT % 32 6*   < > 32 5*   PLATELETS Thousands/uL 335   < > 332   NEUTROS PCT %  --   --  87*   LYMPHS PCT %  --   --  5*   MONOS PCT % --   --  7   EOS PCT %  --   --  0    < > = values in this interval not displayed       Results from last 7 days   Lab Units 04/23/22  0828   SODIUM mmol/L 141   POTASSIUM mmol/L 3 0*   CHLORIDE mmol/L 106   CO2 mmol/L 26   BUN mg/dL 6   CREATININE mg/dL 0 41*   ANION GAP mmol/L 9   CALCIUM mg/dL 8 3   ALBUMIN g/dL 2 8*   TOTAL BILIRUBIN mg/dL 0 33   ALK PHOS U/L 407*   ALT U/L 866*   AST U/L 337*   GLUCOSE RANDOM mg/dL 117     Results from last 7 days   Lab Units 04/23/22  0829   INR  1 05     Results from last 7 days   Lab Units 04/23/22  1535 04/23/22  1105 04/23/22  0731 04/22/22  2148 04/22/22  1611 04/22/22  1110 04/22/22  0803   POC GLUCOSE mg/dl 98 177* 118 130 125 98 131     Results from last 7 days   Lab Units 04/21/22  1954   HEMOGLOBIN A1C % 5 5     Results from last 7 days   Lab Units 04/22/22  0728 04/22/22  0014 04/21/22  2158 04/21/22 2021   LACTIC ACID mmol/L  --  1 3 3 3* 5 6*   PROCALCITONIN ng/ml 11 76*  --   --   --        Lines/Drains:  Invasive Devices  Report    Peripheral Intravenous Line            Peripheral IV 04/21/22 Left;Dorsal (posterior) Forearm 1 day    Peripheral IV 04/21/22 Right;Lateral Antecubital 1 day                      Imaging: Reviewed radiology reports from this admission including: abdominal/pelvic CT    Recent Cultures (last 7 days):   Results from last 7 days   Lab Units 04/21/22 2021   BLOOD CULTURE  Escherichia coli*  Escherichia coli*   GRAM STAIN RESULT  Gram negative rods*  Gram negative rods*       Last 24 Hours Medication List:   Current Facility-Administered Medications   Medication Dose Route Frequency Provider Last Rate    amLODIPine  5 mg Oral Daily Vanessa Medrano DO      cefTRIAXone  1,000 mg Intravenous Q24H Vanessa Medrano DO 1,000 mg (04/22/22 2018)    enoxaparin  30 mg Subcutaneous Daily Vanessa Medrano DO      insulin lispro  1-5 Units Subcutaneous TID FLORENTIN Bustos DO      lisinopril  20 mg Oral BID Vanessa Medrano DO      potassium chloride  40 mEq Oral BID Padmini Erickson MD      verapamil  180 mg Oral Daily Cassidy Hand DO          Today, Patient Was Seen By: Zofia Huerta MD    **Please Note: This note may have been constructed using a voice recognition system  **

## 2022-04-23 NOTE — CONSULTS
Consultation - Infectious Disease   Jennifer Jones 80 y o  female MRN: [id number]  Unit/Bed#: S -01 Encounter: [id number]      IMPRESSION & RECOMMENDATIONS:   Impression/Recommendations:  1  Severe sepsis, POA  Fever, HR, lactic acidosis  Due to # 2/3  No other appreciable source  ROS and exam otherwise benign  UA, flu/RSV/COVID PCR, CXR, CTA/P otherwise unremarkable  Clinically stable and nontoxic  Improving  Rec:  · Continue antibiotics as below  · Follow-up blood cultures as below  · Follow temperatures closely  · Recheck CBC in a m  · Supportive care as per the primary service    2   E coli bacteremia  Due to # 3  No other appreciable source  UA, CTA/P otherwise unremarkable  Low risk for MDRO  Rec:  · Continue ceftriaxone for now  · Follow-up formal ID/susceptibilities and tailor antibiotics as indicated    3  Acute cholangitis  Suspect due to biliary sludging  Status post remote cholecystectomy  CTA/P, ADALI U/S showed possible pneumobilia but no choledocholithiasis or biliary dilatation  LFTs remain elevated but trending downward  Rec:  · Continue antibiotics as above  · Recheck CMP in a m  · Discontinue HIDA given status post cholecystectomy  · Discussed possible MRCP with family who decline as unclear if patient would tolerate study and they would not pursue any active intervention if abnormality found given advanced age    3  DM  A1c 5 5  The above impression and plan was discussed in detail with the patient's daughter at the bedside as well as Dr Suad Schwartz  Antibiotics:  Ceftriaxone #2    Thank you for this consultation  We will follow along with you  HISTORY OF PRESENT ILLNESS:  Reason for Consult: Bacteremia    HPI: Jennifer Jones is a 80 y o  female relatively healthy for her age  Her daughter is at the bedside and helps provide history  She has a remote history of cholecystectomy decades ago    Patient initially presented to the hospital on 04/21 with complaints of fever and epigastric pain at home that lasted 5 hours  The patient had a similar episode of abdominal discomfort approximately 6-8 months ago  Upon presentation she was noted to be febrile tachycardia  Her labs revealed lactic acidosis and significantly elevated LFTs  She underwent CTA/P and right upper quadrant ultrasound which showed no stones or biliary dilatation  There was note of possible pneumobilia  She was started on IV antibiotics  Her blood cultures have come back growing gram-negative rods  Her LFTs are improving  We are asked to comment on further evaluation and management  In performing this consult, I have reviewed prior admission and outpatient visit records in detail  REVIEW OF SYSTEMS:  Her abdominal pain has resolved  Denies fevers, chills, sweats, nausea, vomiting, or diarrhea  A complete system-based review of systems is otherwise negative  PAST MEDICAL HISTORY:  Past Medical History:   Diagnosis Date    Diabetes mellitus ([facility] [address] )     Hypertension     Macular degeneration      History reviewed  No pertinent surgical history  FAMILY HISTORY:  Non-contributory    SOCIAL HISTORY:  Social History     Substance and Sexual Activity   Alcohol Use No     Social History     Substance and Sexual Activity   Drug Use No     Social History     Tobacco Use   Smoking Status Never Smoker   Smokeless Tobacco Not on file       ALLERGIES:  No Known Allergies    MEDICATIONS:  All current active medications have been reviewed      PHYSICAL EXAM:  Vitals:  Temp:  [97 7 °F (36 5 °C)-98 1 °F (36 7 °C)] 97 7 °F (36 5 °C)  HR:  [82-84] 82  Resp:  [16-20] 18  BP: (148-183)/(70-95) 183/95  SpO2:  [95 %] 95 %  Temp (24hrs), Av 9 °F (36 6 °C), Min:97 7 °F (36 5 °C), Max:98 1 °F (36 7 °C)  Current: Temperature: 97 7 °F (36 5 °C)     Physical Exam:  General:  Well-nourished, well-developed, in no acute distress  Eyes:  Conjunctive clear with no hemorrhages or effusions  Oropharynx:  No ulcers, no lesions  Neck:  Supple, no lymphadenopathy  Lungs:  Normal respiratory excursion, no accessory muscle use  Cardiac:  Regular rate and rhythm, extremities well perfused  Abdomen:  Soft, non-tender, non-distended  Extremities:  No peripheral cyanosis, clubbing, or edema  Skin:  No rashes, no ulcers  Neurological:  Moves all four extremities spontaneously, sensation grossly intact    LABS, IMAGING, & OTHER STUDIES:  Lab Results:  I have personally reviewed pertinent labs  Results from last 7 days   Lab Units 04/23/22  0828 04/23/22  0827 04/23/22  0821 04/22/22  1606 04/22/22  0728 04/21/22 1954 04/21/22 1954   POTASSIUM mmol/L 3 0*  --   --   --  3 5  --  3 8   CHLORIDE mmol/L 106  --   --   --  108  --  104   CO2 mmol/L 26  --   --   --  24  --  22   BUN mg/dL 6  --   --   --  16  --  26*   CREATININE mg/dL 0 41*  --   --   --  0 57*  --  0 81   EGFR ml/min/1 73sq m 83  --   --   --  74  --  58   CALCIUM mg/dL 8 3  --   --   --  8 1*  --  8 5   AST U/L 337* 346* 328*   < > 1,410*   < > 2,363*   ALT U/L 866* 889* 889*   < > 1,516*   < > 1,507*   ALK PHOS U/L 407* 410* 411*   < > 517*   < > 666*    < > = values in this interval not displayed  Results from last 7 days   Lab Units 04/23/22  0828 04/22/22  0522 04/22/22  0521 04/21/22 1954 04/21/22 1954   WBC Thousand/uL 10 60*  --  15 47*  --  11 06*   HEMOGLOBIN g/dL 10 5*  --  10 2*  --  11 0*   PLATELETS Thousands/uL 335 349 332   < > 379    < > = values in this interval not displayed  Results from last 7 days   Lab Units 04/21/22 2021   BLOOD CULTURE  Gram Negative Anibal resembling Escherichia coli*   GRAM STAIN RESULT  Gram negative rods*  Gram negative rods*       Imaging Studies:   I have personally reviewed pertinent imaging study reports and images in PACS  CT A/P reviewed personally no biliary ductal dilatation    EKG, Pathology, and Other Studies:   I have personally reviewed pertinent reports

## 2022-04-23 NOTE — ASSESSMENT & PLAN NOTE
Lab Results   Component Value Date    HGBA1C 5 5 04/21/2022       Recent Labs     04/22/22  1611 04/22/22  2148 04/23/22  0731 04/23/22  1105   POCGLU 125 130 118 177*       Blood Sugar Average: Last 72 hrs:  · (P) 426 [id number]   Plan  · Hold Janumet  · Insulin sliding scale  · Hemoglobin A1c

## 2022-04-24 ENCOUNTER — APPOINTMENT (INPATIENT)
Dept: NON INVASIVE DIAGNOSTICS | Facility: HOSPITAL | Age: 87
DRG: 872 | End: 2022-04-24
Payer: MEDICARE

## 2022-04-24 LAB
ANION GAP SERPL CALCULATED.3IONS-SCNC: 10 MMOL/L (ref 4–13)
AORTIC ROOT: 3.1 CM
AORTIC VALVE MEAN VELOCITY: 17.2 M/S
ASCENDING AORTA: 3.6 CM (ref 1.83–2.73)
AV AREA BY CONTINUOUS VTI: 1.4 CM2
AV AREA PEAK VELOCITY: 1.4 CM2
AV LVOT MEAN GRADIENT: 4 MMHG
AV LVOT PEAK GRADIENT: 6 MMHG
AV MEAN GRADIENT: 14 MMHG
AV PEAK GRADIENT: 23 MMHG
AV VALVE AREA: 1.4 CM2
AV VELOCITY RATIO: 0.5
BUN SERPL-MCNC: 16 MG/DL (ref 5–25)
CALCIUM SERPL-MCNC: 9 MG/DL (ref 8.3–10.1)
CHLORIDE SERPL-SCNC: 109 MMOL/L (ref 100–108)
CO2 SERPL-SCNC: 23 MMOL/L (ref 21–32)
CREAT SERPL-MCNC: 0.68 MG/DL (ref 0.6–1.3)
DOP CALC AO PEAK VEL: 2.4 M/S
DOP CALC AO VTI: 40 CM
DOP CALC LVOT AREA: 2.54 CM2
DOP CALC LVOT DIAMETER: 1.8 CM
DOP CALC LVOT PEAK VEL VTI: 22 CM
DOP CALC LVOT PEAK VEL: 1.2 M/S
DOP CALC LVOT STROKE INDEX: 44 ML/M2
DOP CALC LVOT STROKE VOLUME: 55.95 CM3
E WAVE DECELERATION TIME: 258 MS
ERYTHROCYTE [DISTWIDTH] IN BLOOD BY AUTOMATED COUNT: 13.7 % (ref 11.6–15.1)
FRACTIONAL SHORTENING: 36 % (ref 28–44)
GFR SERPL CREATININE-BSD FRML MDRD: 70 ML/MIN/1.73SQ M
GLUCOSE SERPL-MCNC: 111 MG/DL (ref 65–140)
GLUCOSE SERPL-MCNC: 112 MG/DL (ref 65–140)
GLUCOSE SERPL-MCNC: 118 MG/DL (ref 65–140)
GLUCOSE SERPL-MCNC: 139 MG/DL (ref 65–140)
GLUCOSE SERPL-MCNC: 95 MG/DL (ref 65–140)
HCT VFR BLD AUTO: 35.3 % (ref 34.8–46.1)
HGB BLD-MCNC: 11.3 G/DL (ref 11.5–15.4)
INTERVENTRICULAR SEPTUM IN DIASTOLE (PARASTERNAL SHORT AXIS VIEW): 1.8 CM
INTERVENTRICULAR SEPTUM: 1.8 CM (ref 0.49–0.92)
LEFT ATRIUM AREA SYSTOLE SINGLE PLANE A4C: 15.9 CM2
LEFT ATRIUM SIZE: 3.9 CM
LEFT INTERNAL DIMENSION IN SYSTOLE: 1.6 CM (ref 2.31–3.5)
LEFT VENTRICULAR INTERNAL DIMENSION IN DIASTOLE: 2.5 CM (ref 3.76–5.59)
LEFT VENTRICULAR POSTERIOR WALL IN END DIASTOLE: 1.8 CM (ref 0.48–0.9)
LEFT VENTRICULAR STROKE VOLUME: 14 ML
LVSV (TEICH): 14 ML
MCH RBC QN AUTO: 31.2 PG (ref 26.8–34.3)
MCHC RBC AUTO-ENTMCNC: 32 G/DL (ref 31.4–37.4)
MCV RBC AUTO: 98 FL (ref 82–98)
MV E'TISSUE VEL-SEP: 6 CM/S
MV PEAK A VEL: 1.28 M/S
MV PEAK E VEL: 62 CM/S
MV STENOSIS PRESSURE HALF TIME: 75 MS
MV VALVE AREA P 1/2 METHOD: 2.93 CM2
PLATELET # BLD AUTO: 377 THOUSANDS/UL (ref 149–390)
PMV BLD AUTO: 9.6 FL (ref 8.9–12.7)
POTASSIUM SERPL-SCNC: 4.3 MMOL/L (ref 3.5–5.3)
RBC # BLD AUTO: 3.62 MILLION/UL (ref 3.81–5.12)
RIGHT ATRIUM AREA SYSTOLE A4C: 13.3 CM2
RIGHT VENTRICLE ID DIMENSION: 3.5 CM
SL CV LV EF: 70
SL CV PED ECHO LEFT VENTRICLE DIASTOLIC VOLUME (MOD BIPLANE) 2D: 21 ML
SL CV PED ECHO LEFT VENTRICLE SYSTOLIC VOLUME (MOD BIPLANE) 2D: 8 ML
SODIUM SERPL-SCNC: 142 MMOL/L (ref 136–145)
TR MAX PG: 25 MMHG
TR PEAK VELOCITY: 2.5 M/S
TRICUSPID VALVE PEAK REGURGITATION VELOCITY: 2.51 M/S
WBC # BLD AUTO: 8.92 THOUSAND/UL (ref 4.31–10.16)
Z-SCORE OF ASCENDING AORTA: 5.77
Z-SCORE OF INTERVENTRICULAR SEPTUM IN END DIASTOLE: 10.05
Z-SCORE OF LEFT VENTRICULAR DIMENSION IN END DIASTOLE: -6.06
Z-SCORE OF LEFT VENTRICULAR DIMENSION IN END SYSTOLE: -4.54
Z-SCORE OF LEFT VENTRICULAR POSTERIOR WALL IN END DIASTOLE: 10.16

## 2022-04-24 PROCEDURE — 93306 TTE W/DOPPLER COMPLETE: CPT

## 2022-04-24 PROCEDURE — 99232 SBSQ HOSP IP/OBS MODERATE 35: CPT | Performed by: INTERNAL MEDICINE

## 2022-04-24 PROCEDURE — 80048 BASIC METABOLIC PNL TOTAL CA: CPT

## 2022-04-24 PROCEDURE — 97110 THERAPEUTIC EXERCISES: CPT

## 2022-04-24 PROCEDURE — 82948 REAGENT STRIP/BLOOD GLUCOSE: CPT

## 2022-04-24 PROCEDURE — 85027 COMPLETE CBC AUTOMATED: CPT

## 2022-04-24 PROCEDURE — 97116 GAIT TRAINING THERAPY: CPT

## 2022-04-24 PROCEDURE — 93306 TTE W/DOPPLER COMPLETE: CPT | Performed by: INTERNAL MEDICINE

## 2022-04-24 RX ADMIN — LISINOPRIL 20 MG: 20 TABLET ORAL at 09:24

## 2022-04-24 RX ADMIN — ENOXAPARIN SODIUM 30 MG: 40 INJECTION SUBCUTANEOUS at 09:24

## 2022-04-24 RX ADMIN — VERAPAMIL HYDROCHLORIDE 180 MG: 180 TABLET ORAL at 09:25

## 2022-04-24 RX ADMIN — LISINOPRIL 20 MG: 20 TABLET ORAL at 17:27

## 2022-04-24 RX ADMIN — CEFTRIAXONE 1000 MG: 1 INJECTION, POWDER, FOR SOLUTION INTRAMUSCULAR; INTRAVENOUS at 20:19

## 2022-04-24 RX ADMIN — AMLODIPINE BESYLATE 5 MG: 5 TABLET ORAL at 09:24

## 2022-04-24 NOTE — ASSESSMENT & PLAN NOTE
Lab Results   Component Value Date    HGBA1C 5 5 04/21/2022     Recent Labs     04/23/22  1535 04/23/22  2111 04/24/22  0702 04/24/22  1056   POCGLU 98 121 112 139   Blood Sugar Average: Last 72 hrs:  · (P) 124 9     Plan  · Hold Janumet    · Insulin sliding scale, POCT  · Diabetic diet

## 2022-04-24 NOTE — ASSESSMENT & PLAN NOTE
Recent Labs     04/23/22  0821 04/23/22  0827 04/23/22  0828   * 346* 337*   * 889* 866*   ALKPHOS 411* 410* 407*   TBILI 0 34 0 35 0 33     Acetaminophen:  Daily use unknown dosage  Acetaminophen level 18 5    Imaging  · CT Abd/Pev:  Unremarkable  · U/S:  Pending  There is a mixed pattern in terms of the transaminitis  Status post acetylcysteine infusion in the emergency department  Etiologies:  Most likely secondary to hepatic infarction, versus hypoperfusion from sepsis  Need to rule out viral etiology       Plan:  · A m  CMP  · GI on board  · Follow Hepatitis panel, HSV type 1, 2 PCR, CMV, EBV, HSV  · Pending Doppler ultrasound studies

## 2022-04-24 NOTE — PROGRESS NOTES
[facility]  Progress Note Zhou Gan 7/19/1918, 80 y o  female MRN: [id number]  Unit/Bed#: S -01 Encounter: [id number]  Primary Care Provider: Walt Mcmullen MD   Date and time admitted to hospital: 4/21/2022  7:24 PM    * Sepsis [facility])  Assessment & Plan  POA: Sepsis as evidenced by fever 101 7, tachycardia 98, leukocytosis 11 06     /94   Pulse 88   Temp 98 4 °F (36 9 °C)   Resp 16   Ht 5' 1" (1 549 m)   Wt 59 9 kg (132 lb)   LMP  (LMP Unknown)   SpO2 96%   BMI 24 94 kg/m²     CT abdomen pelvis with contrast was unremarkable  Ultrasound right upper quadrant shows pneumobilia, normal common bile duct  Chest x-ray unremarkable  Urinalysis unremarkable  Patient had 1/2 E coli on blood cultures  Leukocytosis resolved, lactic acidosis resolved  Echocardiogram - EF 70%  Grade 1 diastolic dysfunction  Aortic valve moderate stenosis  Plan:   Abx:  Ceftriaxone 1 g Q 24 hours day #3/10 - plan to transition to p o  Antibiotics and 24 hours  Patient to complete a total of 10 days of antibiotic course   Encourage oral hydration   Continue to follow culture sensitivities   Post acute rehab was recommended: Reach notify case management  Bacteremia  Assessment & Plan  1/2 E coli bacteremia  No clear source for now  ID on board possible MRCP  However risk versus benefit is being discussed with patient  Patient's family believes that if something is found with MRCP, with her advanced age, will it be worth doing anything  Patients family notified that patient is clinically improving  No more fever, white count trending down, blood pressure stable  Plan:  Continue ceftriaxone  Follow sensitivities    Transaminitis  Assessment & Plan  Recent Labs     04/23/22  0821 04/23/22  0827 04/23/22  0828   * 346* 337*   * 889* 866*   ALKPHOS 411* 410* 407*   TBILI 0 34 0 35 0 33     Acetaminophen:  Daily use unknown dosage    Acetaminophen level 18 5    Imaging  · CT Abd/Pev:  Unremarkable  · U/S:  Pending  There is a mixed pattern in terms of the transaminitis  Status post acetylcysteine infusion in the emergency department  Etiologies:  Most likely secondary to hepatic infarction, versus hypoperfusion from sepsis  Need to rule out viral etiology  Plan:  · A m  CMP  · GI on board  · Follow Hepatitis panel, HSV type 1, 2 PCR, CMV, EBV, HSV  · Pending Doppler ultrasound studies          Type 2 diabetes mellitus without complication, without long-term current use of insulin [facility])  Assessment & Plan  Lab Results   Component Value Date    HGBA1C 5 5 04/21/2022     Recent Labs     04/23/22  1535 04/23/22  2111 04/24/22  0702 04/24/22  1056   POCGLU 98 121 112 139   Blood Sugar Average: Last 72 hrs:  · (P) 124 9     Plan  · Hold Janumet  · Insulin sliding scale, POCT  · Diabetic diet    Essential hypertension  Assessment & Plan  · Blood pressure acceptable    Plan:  · Continue amlodipine 5 mg daily  · Continue verapamil 180 mg daily  · Continue lisinopril 20 mg b i d  Vanessa Bennett Acetaminophen toxicity-resolved as of 4/22/2022  Assessment & Plan  · Takes daily acetaminophen 4 tablets per day unknown dosage  · Serum acetaminophen level 15 8  Acetaminophen toxicity is unlikely given patient was only taking 2 g of Tylenol per day per GI  Plan  · Received 3 doses of acetylcysteine      VTE Pharmacologic Prophylaxis: VTE Score: 8 High Risk (Score >/= 5) - Pharmacological DVT Prophylaxis Ordered: enoxaparin (Lovenox)  Sequential Compression Devices Ordered  Patient Centered Rounds: I performed bedside rounds with nursing staff today  Discussions with Specialists or Other Care Team Provider:  Infectious disease    Education and Discussions with Family / Patient: Updated  (daughter) via phone      Current Length of Stay: 3 day(s)  Current Patient Status: Inpatient   Discharge Plan: Anticipate discharge tomorrow to Home VS post acute rehab    Code Status: Level 1 - Full Code    Subjective:   Nursing team reported no overnight events, and patient showered and was able to walk with assistance in room  Patient denied any symptoms including chills, fever, diaphoresis, cough, headache, abdominal pain, nausea, vomiting  Objective:     Vitals:   Temp (24hrs), Av 6 °F (37 °C), Min:98 3 °F (36 8 °C), Max:99 1 °F (37 3 °C)    Temp:  [98 3 °F (36 8 °C)-99 1 °F (37 3 °C)] 98 4 °F (36 9 °C)  HR:  [] 88  Resp:  [16-18] 16  BP: (138-167)/(76-94) 156/94  SpO2:  [95 %-96 %] 96 %  Body mass index is 24 94 kg/m²  Input and Output Summary (last 24 hours): Intake/Output Summary (Last 24 hours) at 2022 1301  Last data filed at 2022 1507  Gross per 24 hour   Intake 200 ml   Output 550 ml   Net -350 ml       Physical Exam:   Physical Exam  Vitals and nursing note reviewed  Constitutional:       Appearance: Normal appearance  She is normal weight  She is not ill-appearing or diaphoretic  HENT:      Head: Normocephalic and atraumatic  Mouth/Throat:      Mouth: Mucous membranes are moist       Pharynx: Oropharynx is clear  Eyes:      General: No scleral icterus  Conjunctiva/sclera: Conjunctivae normal    Cardiovascular:      Rate and Rhythm: Normal rate and regular rhythm  Pulses: Normal pulses  Heart sounds: Normal heart sounds  No murmur heard  No gallop  Pulmonary:      Effort: Pulmonary effort is normal  No respiratory distress  Breath sounds: Normal breath sounds  No wheezing or rales  Abdominal:      General: Bowel sounds are normal  There is no distension  Palpations: Abdomen is soft  There is no mass  Tenderness: There is no abdominal tenderness  Musculoskeletal:         General: Normal range of motion  Cervical back: Normal range of motion and neck supple  Right lower leg: No edema  Left lower leg: No edema  Skin:     General: Skin is warm and dry        Capillary Refill: Capillary refill takes less than 2 seconds  Coloration: Skin is not jaundiced  Neurological:      General: No focal deficit present  Mental Status: She is alert and oriented to person, place, and time  Mental status is at baseline  Sensory: No sensory deficit  Psychiatric:         Mood and Affect: Mood normal          Behavior: Behavior normal          Thought Content: Thought content normal          Judgment: Judgment normal          Additional Data:     Labs:  Results from last 7 days   Lab Units 04/24/22  0455 04/22/22  0522 04/22/22  0521   WBC Thousand/uL 8 92   < > 15 47*   HEMOGLOBIN g/dL 11 3*   < > 10 2*   HEMATOCRIT % 35 3   < > 32 5*   PLATELETS Thousands/uL 377   < > 332   NEUTROS PCT %  --   --  87*   LYMPHS PCT %  --   --  5*   MONOS PCT %  --   --  7   EOS PCT %  --   --  0    < > = values in this interval not displayed  Results from last 7 days   Lab Units 04/24/22  0455 04/23/22  0828 04/23/22  0828   SODIUM mmol/L 142   < > 141   POTASSIUM mmol/L 4 3   < > 3 0*   CHLORIDE mmol/L 109*   < > 106   CO2 mmol/L 23   < > 26   BUN mg/dL 16   < > 6   CREATININE mg/dL 0 68   < > 0 41*   ANION GAP mmol/L 10   < > 9   CALCIUM mg/dL 9 0   < > 8 3   ALBUMIN g/dL  --   --  2 8*   TOTAL BILIRUBIN mg/dL  --   --  0 33   ALK PHOS U/L  --   --  407*   ALT U/L  --   --  866*   AST U/L  --   --  337*   GLUCOSE RANDOM mg/dL 111   < > 117    < > = values in this interval not displayed       Results from last 7 days   Lab Units 04/23/22  0829   INR  1 05     Results from last 7 days   Lab Units 04/24/22  1056 04/24/22  0702 04/23/22  2111 04/23/22  1535 04/23/22  1105 04/23/22  0731 04/22/22  2148 04/22/22  1611 04/22/22  1110 04/22/22  0803   POC GLUCOSE mg/dl 139 112 121 98 177* 118 130 125 98 131     Results from last 7 days   Lab Units 04/21/22  1954   HEMOGLOBIN A1C % 5 5     Results from last 7 days   Lab Units 04/22/22  0728 04/22/22  0014 04/21/22  2158 04/21/22 2021   LACTIC ACID mmol/L  --  1 3 3  3* 5 6*   PROCALCITONIN ng/ml 11 76*  --   --   --        Lines/Drains:  Invasive Devices  Report    Peripheral Intravenous Line            Peripheral IV 04/21/22 Left;Dorsal (posterior) Forearm 2 days    Peripheral IV 04/21/22 Right;Lateral Antecubital 2 days                      Imaging: Reviewed radiology reports from this admission including: ECHO    Recent Cultures (last 7 days):   Results from last 7 days   Lab Units 04/21/22 2021   BLOOD CULTURE  Escherichia coli*  Escherichia coli*   GRAM STAIN RESULT  Gram negative rods*  Gram negative rods*       Last 24 Hours Medication List:   Current Facility-Administered Medications   Medication Dose Route Frequency Provider Last Rate    amLODIPine  5 mg Oral Daily [place], DO      cefTRIAXone  1,000 mg Intravenous Q24H [facility], DO 1,000 mg (04/23/22 2037)    enoxaparin  30 mg Subcutaneous Daily Andry Bustos DO      insulin lispro  1-5 Units Subcutaneous TID AC Andry Bustos DO      lisinopril  20 mg Oral BID [facility], DO      [facility]  180 [facility]          Today, Patient Was Seen By: Za Gilbert MD    **Please Note: This note may have been constructed using a voice recognition system  **

## 2022-04-24 NOTE — CASE MANAGEMENT
Case Management Progress Note    Patient name Abi Levin  Location S /S -01 MRN [id number]  : 1918 Date 2022       LOS (days): 3  Geometric Mean LOS (GMLOS) (days): 3 50  Days to GMLOS:0 8        OBJECTIVE:        Current admission status: Inpatient  Preferred Pharmacy:   [facility] [address] [place] [id number]  Phone: [phone] Fax: 3503 [address] [address]  Phone: [phone] Fax: [phone]    Primary Care Provider: Walt Mcmullen MD    Primary Insurance: MEDICARE  Secondary Insurance: [employer]    PROGRESS NOTE:    Cm met with patient's XU and other daughter to address some of their questions  XU and daughter requested information on whether or not patient would be discharged tomorrow  Cm informed family that at this time, therapy just met with patient and is recommending rehab  They were happy to hear this and in agreement with referrals to the [facility]  CM placed referrals   Anticipated dc possible in 24-48hrs

## 2022-04-24 NOTE — ASSESSMENT & PLAN NOTE
POA: Sepsis as evidenced by fever 101 7, tachycardia 98, leukocytosis 11 06     /94   Pulse 88   Temp 98 4 °F (36 9 °C)   Resp 16   Ht 5' 1" (1 549 m)   Wt 59 9 kg (132 lb)   LMP  (LMP Unknown)   SpO2 96%   BMI 24 94 kg/m²     CT abdomen pelvis with contrast was unremarkable  Ultrasound right upper quadrant shows pneumobilia, normal common bile duct  Chest x-ray unremarkable  Urinalysis unremarkable  Patient had 1/2 E coli on blood cultures  Leukocytosis resolved, lactic acidosis resolved  Echocardiogram - EF 70%  Grade 1 diastolic dysfunction  Aortic valve moderate stenosis  Plan:   Abx:  Ceftriaxone 1 g Q 24 hours day #3/10 - plan to transition to p o  Antibiotics and 24 hours  Patient to complete a total of 10 days of antibiotic course   Encourage oral hydration   Continue to follow culture sensitivities   Post acute rehab was recommended: Reach notify case management

## 2022-04-24 NOTE — ASSESSMENT & PLAN NOTE
· Blood pressure acceptable    Plan:  · Continue amlodipine 5 mg daily  · Continue verapamil 180 mg daily  · Continue lisinopril 20 mg b i d

## 2022-04-24 NOTE — ASSESSMENT & PLAN NOTE
1/2 E coli bacteremia  No clear source for now  ID on board possible MRCP  However risk versus benefit is being discussed with patient  Patient's family believes that if something is found with MRCP, with her advanced age, will it be worth doing anything  Patients family notified that patient is clinically improving  No more fever, white count trending down, blood pressure stable      Plan:  Continue ceftriaxone  Follow sensitivities

## 2022-04-24 NOTE — PROGRESS NOTES
Progress Note - Infectious Disease   Clint Das 80 y o  female MRN: [id number]  Unit/Bed#: S -01 Encounter: [id number]      Impression/Recommendations:  1  Severe sepsis, POA  Fever, HR, lactic acidosis  Due to # 2/3  No other appreciable source  ROS and exam otherwise benign  UA, flu/RSV/COVID PCR, CXR, CTA/P otherwise unremarkable  Clinically stable and nontoxic  Improving  Rec:  · Continue antibiotics as below  · Follow up blood cultures as below  · Follow temperatures and WBC closely  · Supportive care as per the primary service     2   E coli bacteremia  Due to # 3  No other appreciable source  UA, CTA/P otherwise unremarkable  Low risk for MDRO  Rec:  · Continue ceftriaxone for now  · Follow-up susceptibilities and tailor antibiotics as indicated  · Anticipate transition to PO antibiotics in next 24 hours with plan to complete 10 days total treatment     3  Acute cholangitis  Suspect due to biliary sludging  Status post remote cholecystectomy  CTA/P, ADALI U/S showed possible pneumobilia but no choledocholithiasis or biliary dilatation  LFTs remain elevated but trending downward  Rec:  · Continue antibiotics as above  · Recheck CMP in a m  · Discussed possible MRCP with family who decline as unclear if patient would tolerate study and they would not pursue any active intervention if abnormality found given advanced age     4  DM  A1c 5 5        Antibiotics:  Ceftriaxone #3    Subjective:  Patient seen on AM rounds  Doing well  Eating breakfast   Appetite good  Denies abdominal pain  Denies fevers, chills, sweats, nausea, vomiting, or diarrhea  24 Hour Events:  No documented fevers, chills, sweats, nausea, vomiting, or diarrhea      Objective:  Vitals:  Temp:  [98 3 °F (36 8 °C)-99 1 °F (37 3 °C)] 98 4 °F (36 9 °C)  HR:  [] 105  Resp:  [16-18] 16  BP: (138-167)/(76-94) 156/94  SpO2:  [95 %-96 %] 96 %  Temp (24hrs), Av 6 °F (37 °C), Min:98 3 °F (36 8 °C), Max:99 1 °F (37 3 °C)  Current: Temperature: 98 4 °F (36 9 °C)    Physical Exam:   General:  No acute distress  Psychiatric:  Awake and alert  Pulmonary:  Normal respiratory excursion without accessory muscle use  Abdomen:  Soft, nontender  Extremities:  No edema  Skin:  No rashes    Lab Results:  I have personally reviewed pertinent labs  Results from last 7 days   Lab Units 04/24/22  0455 04/23/22  0828 04/23/22  0827 04/23/22  0821 04/22/22  1606 04/22/22  0728   POTASSIUM mmol/L 4 3 3 0*  --   --   --  3 5   CHLORIDE mmol/L 109* 106  --   --   --  108   CO2 mmol/L 23 26  --   --   --  24   BUN mg/dL 16 6  --   --   --  16   CREATININE mg/dL 0 68 0 41*  --   --   --  0 57*   EGFR ml/min/1 73sq m 70 83  --   --   --  74   CALCIUM mg/dL 9 0 8 3  --   --   --  8 1*   AST U/L  --  337* 346* 328*   < > 1,410*   ALT U/L  --  866* 889* 889*   < > 1,516*   ALK PHOS U/L  --  407* 410* 411*   < > 517*    < > = values in this interval not displayed  Results from last 7 days   Lab Units 04/24/22 0455 04/23/22  0828 04/22/22  0522 04/22/22  0521 04/22/22  0521   WBC Thousand/uL 8 92 10 60*  --   --  15 47*   HEMOGLOBIN g/dL 11 3* 10 5*  --   --  10 2*   PLATELETS Thousands/uL 377 335 349   < > 332    < > = values in this interval not displayed  Results from last 7 days   Lab Units 04/21/22 2021   BLOOD CULTURE  Escherichia coli*  Escherichia coli*   GRAM STAIN RESULT  Gram negative rods*  Gram negative rods*       Imaging Studies:   I have personally reviewed pertinent imaging study reports and images in PACS  EKG, Pathology, and Other Studies:   I have personally reviewed pertinent reports

## 2022-04-24 NOTE — PHYSICAL THERAPY NOTE
PHYSICAL THERAPY NOTE          Patient Name: Tashia Trotter  SQDNX'F Date: 4/24/2022 04/24/22 1230   PT Last Visit   PT Visit Date 04/24/22   Note Type   Note Type Treatment   Pain Assessment   Pain Assessment Tool 0-10   Pain Score No Pain   Restrictions/Precautions   Weight Bearing Precautions Per Order No   Other Precautions Chair Alarm; Bed Alarm; Fall Risk;Telemetry;Hard of hearing   General   Chart Reviewed Yes   Family/Caregiver Present No   Cognition   Overall Cognitive Status WFL   Arousal/Participation Alert; Responsive; Cooperative   Attention Within functional limits   Orientation Level Oriented to person;Oriented to place;Oriented to time;Oriented to situation   Memory Decreased long term memory   Following Commands Follows one step commands without difficulty   Transfers   Sit to Stand 4  Minimal assistance   Additional items Assist x 1; Armrests   Stand to Sit 4  Minimal assistance   Additional items Assist x 1; Armrests   Stand pivot 4  Minimal assistance   Additional items Assist x 1;Bedrails   Ambulation/Elevation   Gait pattern Narrow YASMEEN; Forward Flexion;Decreased foot clearance; Short stride; Excessively slow; Step through pattern   Gait Assistance 4  Minimal assist   Additional items Assist x 1   Assistive Device Rolling walker   Distance 5'x2   Balance   Static Sitting Fair -   Dynamic Sitting Fair -   Static Standing Fair -   Dynamic Standing [address], & 5 -   Activity Tolerance   Activity Tolerance Patient limited by fatigue   Nurse Made Aware FRITZ Claire   Assessment   Prognosis Fair   Problem List Decreased strength;Decreased range of motion;Decreased endurance;Decreased mobility; Impaired balance;Decreased cognition;Decreased safety awareness; Impaired vision; Impaired hearing;Decreased skin integrity;Pain   Assessment PT seated in recliner chair to start session, stated she was feeling fine and doing well  Pt able to amb to and from commode 5' with RW and Min A  Completed STS with min A, poor grasp on L hand due contractures  Pt completed general LB stregenthing seated with minimal demonstration  Pt seated in chair to end session with call bell in reach and all needs met  Pt will benefit from continued skilled PT to improve over all strength and endurence  Goals   Patient Goals to go home   STG Expiration Date 05/02/22   Short Term Goal #1 pt will: Increase bilateral LE strength 1/2 grade to facilitate independent mobility, Perform bed mobility w/ minx1 to increase level of independence, Perform all transfers w/ minx1 to improve independence, Ambulate 50 ft  with roller walker w/ minx1 w/o LOB to improve functional independence, Increase all balance 1 grade to decrease risk for falls, Tolerate 3 hr OOB to faciliate upright tolerance and Improve Barthel Index score to 40 or greater to facilitate independence   PT Treatment Day 1   Plan   Treatment/Interventions Functional transfer training;LE strengthening/ROM; Endurance training; Therapeutic exercise; Bed mobility;Gait training   Progress Progressing toward goals   PT Frequency 4-6x/wk   Recommendation   PT Discharge Recommendation Post acute rehabilitation services   AM-PAC Basic Mobility Inpatient   Turning in Bed Without Bedrails 3   Lying on Back to Sitting on Edge of Flat Bed 3   Moving Bed to Chair 3   Standing Up From Chair 3   Walk in Room 3   Climb 3-5 Stairs 2   Basic Mobility Inpatient Raw Score 17   Basic Mobility Standardized Score 39 67   Turning Head Towards Sound 4   Follow Simple Instructions 4   Low Function Basic Mobility Raw Score 25   Low Function Basic Mobility Standardized Score 39 85   Highest Level Of Mobility   JH-HLM Goal 5: Stand one or more mins   JH-HLM Highest Level of Mobility 6: Walk 10 steps or more   JH-HLM Goal Achieved Yes   End of Consult   Patient Position at End of Consult Bedside chair;Bed/Chair alarm activated; All needs within reach   End of Consult Comments Pt will benefit from rehab at this time due to limited ambulation distance and min A for most activity      Saran Bermudez, PTA

## 2022-04-25 LAB
ALBUMIN SERPL BCP-MCNC: 2.5 G/DL (ref 3.5–5)
ALP SERPL-CCNC: 254 U/L (ref 46–116)
ALT SERPL W P-5'-P-CCNC: 341 U/L (ref 12–78)
ANION GAP SERPL CALCULATED.3IONS-SCNC: 8 MMOL/L (ref 4–13)
AST SERPL W P-5'-P-CCNC: 47 U/L (ref 5–45)
BILIRUB SERPL-MCNC: 0.27 MG/DL (ref 0.2–1)
BUN SERPL-MCNC: 19 MG/DL (ref 5–25)
CALCIUM ALBUM COR SERPL-MCNC: 9.5 MG/DL (ref 8.3–10.1)
CALCIUM SERPL-MCNC: 8.3 MG/DL (ref 8.3–10.1)
CHLORIDE SERPL-SCNC: 106 MMOL/L (ref 100–108)
CO2 SERPL-SCNC: 25 MMOL/L (ref 21–32)
CREAT SERPL-MCNC: 0.6 MG/DL (ref 0.6–1.3)
ERYTHROCYTE [DISTWIDTH] IN BLOOD BY AUTOMATED COUNT: 13.6 % (ref 11.6–15.1)
GFR SERPL CREATININE-BSD FRML MDRD: 73 ML/MIN/1.73SQ M
GLUCOSE SERPL-MCNC: 103 MG/DL (ref 65–140)
GLUCOSE SERPL-MCNC: 114 MG/DL (ref 65–140)
GLUCOSE SERPL-MCNC: 115 MG/DL (ref 65–140)
GLUCOSE SERPL-MCNC: 115 MG/DL (ref 65–140)
GLUCOSE SERPL-MCNC: 97 MG/DL (ref 65–140)
HAV IGM SER QL: NORMAL
HBV CORE IGM SER QL: NORMAL
HBV SURFACE AG SER QL: NORMAL
HCT VFR BLD AUTO: 32.6 % (ref 34.8–46.1)
HCV AB SER QL: NORMAL
HGB BLD-MCNC: 10.2 G/DL (ref 11.5–15.4)
MCH RBC QN AUTO: 30.9 PG (ref 26.8–34.3)
MCHC RBC AUTO-ENTMCNC: 31.3 G/DL (ref 31.4–37.4)
MCV RBC AUTO: 99 FL (ref 82–98)
PLATELET # BLD AUTO: 376 THOUSANDS/UL (ref 149–390)
PMV BLD AUTO: 9.4 FL (ref 8.9–12.7)
POTASSIUM SERPL-SCNC: 4.1 MMOL/L (ref 3.5–5.3)
PROT SERPL-MCNC: 6 G/DL (ref 6.4–8.2)
RBC # BLD AUTO: 3.3 MILLION/UL (ref 3.81–5.12)
SODIUM SERPL-SCNC: 139 MMOL/L (ref 136–145)
WBC # BLD AUTO: 7.92 THOUSAND/UL (ref 4.31–10.16)

## 2022-04-25 PROCEDURE — 85027 COMPLETE CBC AUTOMATED: CPT | Performed by: INTERNAL MEDICINE

## 2022-04-25 PROCEDURE — 99232 SBSQ HOSP IP/OBS MODERATE 35: CPT | Performed by: INTERNAL MEDICINE

## 2022-04-25 PROCEDURE — 82948 REAGENT STRIP/BLOOD GLUCOSE: CPT

## 2022-04-25 PROCEDURE — 97116 GAIT TRAINING THERAPY: CPT

## 2022-04-25 PROCEDURE — 80053 COMPREHEN METABOLIC PANEL: CPT | Performed by: INTERNAL MEDICINE

## 2022-04-25 RX ORDER — AMOXICILLIN AND CLAVULANATE POTASSIUM 500; 125 MG/1; MG/1
1 TABLET, FILM COATED ORAL EVERY 8 HOURS SCHEDULED
Status: DISCONTINUED | OUTPATIENT
Start: 2022-04-25 | End: 2022-04-27 | Stop reason: HOSPADM

## 2022-04-25 RX ADMIN — ENOXAPARIN SODIUM 30 MG: 40 INJECTION SUBCUTANEOUS at 10:00

## 2022-04-25 RX ADMIN — VERAPAMIL HYDROCHLORIDE 180 MG: 180 TABLET ORAL at 10:00

## 2022-04-25 RX ADMIN — LISINOPRIL 20 MG: 20 TABLET ORAL at 10:00

## 2022-04-25 RX ADMIN — LISINOPRIL 20 MG: 20 TABLET ORAL at 17:59

## 2022-04-25 RX ADMIN — AMOXICILLIN AND CLAVULANATE POTASSIUM 1 TABLET: 500; 125 TABLET, FILM COATED ORAL at 17:59

## 2022-04-25 RX ADMIN — AMLODIPINE BESYLATE 5 MG: 5 TABLET ORAL at 10:00

## 2022-04-25 NOTE — PHYSICAL THERAPY NOTE
PHYSICAL THERAPY NOTE    Patient Name: Annalise Ryder  CYGGC'E Date: 22 1301   PT Last Visit   PT Visit Date 22   Note Type   Note Type Treatment   Pain Assessment   Pain Assessment Tool 0-10   Pain Score No Pain   Restrictions/Precautions   Weight Bearing Precautions Per Order No   Other Precautions Chair Alarm; Bed Alarm;Multiple lines; Fall Risk;Hard of hearing  ([place])   General   Chart Reviewed Yes   Response to Previous Treatment Patient with no complaints from previous session  Family/Caregiver Present Yes   Cognition   Overall Cognitive Status WFL   Arousal/Participation Alert; Responsive; Cooperative   Attention Within functional limits   Comments Pt identified by name and   Subjective   Subjective Pt agrees to PT treatment and is very pleasant throughout session  "I'm going to be 89 in July "   Bed Mobility   Supine to Sit Unable to assess  (OOB in chair pre/post session with alarm intact)   Transfers   Sit to Stand 4  Minimal assistance   Additional items Assist x 1;Bedrails; Increased time required;Verbal cues  (hand placement)   Stand to Sit 4  Minimal assistance   Additional items Assist x 1; Increased time required;Verbal cues   Ambulation/Elevation   Gait pattern Forward Flexion;Decreased foot clearance; Short stride; Excessively slow   Gait Assistance 4  Minimal assist  (initially min A, regressing to mod A with gait degradation)   Additional items Assist x 1;Verbal cues   Assistive Device Rolling walker   Distance 20` x2   Balance   Static Sitting Fair -   Dynamic Sitting Fair -   Static Standing Fair -   Dynamic Standing Poor +   Ambulatory Poor   Endurance Deficit   Endurance Deficit Yes   Endurance Deficit Description limited ambulation distance, gait degradation, fatigue   Activity Tolerance   Activity Tolerance Patient limited by fatigue;Patient tolerated treatment well   Nurse Made Aware Per RN, pt appropriate to treat   Assessment   Prognosis Fair   Problem List Decreased strength;Decreased range of motion;Decreased endurance;Decreased mobility; Impaired balance;Decreased cognition;Decreased safety awareness; Impaired vision; Impaired hearing;Decreased skin integrity;Pain   Assessment Pt agrees to PT treatment and is very pleasant and cooperative throughout session  Progress noted this session and is able to ambulate increased distance with use of RW  Gait degradation noted with increasing distance  Significant amount of assist required for toilet transfer and requires consistent verbal cues for safety awareness and hand placement for RW  Will continue to benefit from ongoing skilled PT to maximize her functional mobility and increase her level of independence  Goals   Patient Goals to go to rehab   STG Expiration Date 05/02/22   PT Treatment Day 2   Plan   Treatment/Interventions Functional transfer training;LE strengthening/ROM; Therapeutic exercise; Endurance training;Patient/family training;Equipment eval/education; Bed mobility;Gait training   Progress Progressing toward goals   PT Frequency 4-6x/wk   Recommendation   PT Discharge Recommendation Post acute rehabilitation services   Equipment Recommended [facility] Recommended Wheeled walker   AM-PAC Basic Mobility Inpatient   Turning in Bed Without Bedrails 3   Lying on Back to Sitting on Edge of Flat Bed 3   Moving Bed to Chair 3   Standing Up From Chair 3   Walk in Room 3   Climb 3-5 Stairs 1   Basic Mobility Inpatient Raw Score 16   Basic Mobility Standardized Score 38 32   Highest Level Of Mobility   JH-HLM Goal 5: Stand one or more mins   JH-HLM Highest Level of Mobility 7: Walk 25 feet or more   JH-HLM Goal Achieved Yes   Education   Education Provided Mobility training;Assistive device   Patient Reinforcement needed   End of Consult   Patient Position at End of Consult Bedside chair;Bed/Chair alarm activated; All needs within reach   The patient's AM-PAC Basic Mobility Inpatient Short Form Raw Score is 16, Standardized Score is 38 32   A standardized score less than 40 78 or Raw Score of 16 suggests the patient may benefit from discharge to post-acute rehabilitation services, which DOES coincide with CURRENT above PT recommendations  However please refer to therapist recommendation for discharge planning given other factors that may influence destination  Adapted from [employer] -PAC 6-Clicks Basic Mobility and Daily Activity Scores With Discharge Destination  Physical Therapy, 2021;101:1-9   DOI: 10 [id number]    Santana Manzanares PT,DPT

## 2022-04-25 NOTE — OCCUPATIONAL THERAPY NOTE
Occupational Therapy Evaluation     Patient Name: Akanksha Cunningham  TCFTT'V Date: 4/25/2022  Problem List  Principal Problem:    Sepsis [facility])  Active Problems:    Essential hypertension    Type 2 diabetes mellitus without complication, without long-term current use of insulin ([facility] [address] )    Transaminitis    Bacteremia    Past Medical History  Past Medical History:   Diagnosis Date    Diabetes mellitus ([facility] )     Hypertension     Macular degeneration      Past Surgical History  History reviewed  No pertinent surgical history  04/25/22 1300   OT Last Visit   OT Visit Date 04/25/22   Note Type   Note type Evaluation   Restrictions/Precautions   Other Precautions Chair Alarm; Bed Alarm; Fall Risk;Hard of hearing   Pain Assessment   Pain Assessment Tool 0-10   Pain Score No Pain   Home Living   Type of [address] [place] [address] Two level; Able to live on main level with bedroom/bathroom  (3STE)   Bathroom Shower/Tub Walk-in shower   Bathroom Equipment Commode;Grab bars in shower; [address]; Wheelchair-manual   Additional Comments Lives with daughter, assisted with ADLs and IADLs  Ambulates short household distances  Prior Function   Level of [place] Needs assistance with ADLs and functional mobility; Needs assistance with IADLs   Lives With Daughter   Receives Help From Family   ADL Assistance Needs assistance   IADLs Needs assistance   Falls in the last 6 months   (+fall history, number unknown)   Psychosocial   Psychosocial (WDL) WDL   Subjective   Subjective "I don't think I'll make it all the way in there, I'm shaky" Pt is ultimately able to make it into the bathroom  ADL   Where Assessed Chair   Eating Assistance 6  Modified independent   LB Dressing Assistance 2  Maximal Assistance   LB Dressing Deficit Don/doff R sock; Don/doff L sock   Toileting Assistance  3  Moderate Assistance   Additional Comments Pt max A for LB dressing, ambulates to bathroom with min A and transfers onto toilet  Stands with mod A  Bed Mobility   Additional Comments Recieved OOB to chair  Transfers   Sit to Stand 4  Minimal assistance   Additional items Assist x 1   Stand to Sit 4  Minimal assistance   Additional items Assist x 1   Stand pivot 4  Minimal assistance   Additional items Assist x 1   Toilet transfer 3  Moderate assistance   Additional items Assist x 1   Additional Comments Pt reluctant to ambulate to the bathroom but is encouraged to make attempt as there is a commode and armchair to stop along the way  Is able to complete with encouragement  Balance   Static Sitting Fair -   Dynamic Sitting Fair -   Static Standing Fair -   Dynamic Standing Fair -   Ambulatory Fair -   Activity Tolerance   Activity Tolerance Patient limited by fatigue   Nurse [address] to see per RN   RUE Assessment   RUE Assessment X  (B/L ulnar flexion contractures (dupuytren's?))   LUE Assessment   LUE Assessment X   Cognition   Overall Cognitive Status WFL   Arousal/Participation Alert; Responsive; Cooperative   Attention Within functional limits   Orientation Level Oriented X4   Memory Decreased long term memory   Following Commands Follows one step commands without difficulty   Comments [place]  Assessment   Limitation Decreased ADL status; Decreased UE strength;Decreased UE ROM; Decreased Safe judgement during ADL;Decreased endurance;Decreased self-care trans;Decreased high-level ADLs   Prognosis Fair   Assessment Pt is a 80 y o  female seen for OT evaluation s/p [facility] on 4/21/2022 w/ Sepsis ([address] )  Comorbidities affecting pt's functional performance at time of assessment include: HTN, type 2 DM, transaminitis, bacteremia  Personal factors affecting pt at time of IE include:difficulty performing ADLS and difficulty performing IADLS   Prior to admission, pt was assisted with ADLs by her family and was mod I for functional mobility with a RW   Upon evaluation: Pt requires min A for functional mobility, mod A for toilet transfers and mod to max A for ADLs 2* the following deficits impacting occupational performance: weakness, decreased strength, decreased balance, decreased tolerance, impaired sequencing and impaired problem solving  Pt to benefit from continued skilled OT tx while in the hospital to address deficits as defined above and maximize level of functional independence w ADL's and functional mobility  Occupational Performance areas to address include: grooming, bathing/shower, toilet hygiene, dressing, functional mobility and community mobility  From OT standpoint, recommendation at time of d/c would be post acute rehab  Goals   Patient Goals Pt wants to go to nursing home  Plan   Treatment Interventions ADL retraining;Functional transfer training;UE strengthening/ROM; Endurance training;Cognitive reorientation;Patient/family training;Equipment evaluation/education; Compensatory technique education;Continued evaluation   Goal Expiration Date 05/05/22   OT Treatment Day 0   OT Frequency 3-5x/wk   Recommendation   OT Discharge Recommendation Post acute rehabilitation services   AM-PAC Daily Activity Inpatient   Lower Body Dressing 2   Bathing 2   Toileting 2   Upper Body Dressing 3   Grooming 3   Eating 4   Daily Activity Raw Score 16   Daily Activity Standardized Score (Calc for Raw Score >=11) 35 96     GOALS    1) Pt will improve activity tolerance to G for min 30 min txment sessions    2) Pt will complete UB/LB dressing/self care w/ mod I using adaptive device and DME as needed    3) Pt will complete bathing w/ Mod I w/ use of AE and DME as needed    4) Pt will complete toileting w/ mod I w/ G hygiene/thoroughness using DME as needed    5) Pt will improve functional transfers to Mod I on/off all surfaces using DME as needed w/ G balance/safety     6) Pt will improve functional mobility during ADL/IADL/leisure tasks to Mod I using DME as needed w/ G balance/safety         Jenera Lash, MOT, OTR/L

## 2022-04-25 NOTE — CASE MANAGEMENT
Case Management Discharge Planning Note    Patient name Candida Quevedo  Location S /S -01 MRN [id number]  : 1918 Date 2022       Current Admission Date: 2022  Current Admission Diagnosis:Sepsis [facility])   Patient Active Problem List    Diagnosis Date Noted    Transaminitis 2022    Bacteremia 2022    Sepsis ([facility] ) 2022    [facility] 10/16/2020    Essential hypertension 10/22/2019    Primary osteoarthritis of left hip 10/22/2019    Ambulatory dysfunction 10/22/2019    Advanced age 10/22/2019    Type 2 diabetes mellitus without complication, without long-term current use of insulin ([facility] ) 10/22/2019    ARMD (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 degeneration) 10/22/2019    Left sided lacunar infarction [facility] ) 10/22/2019    Primary osteoarthritis of right knee 10/22/2019      LOS (days): 4  Geometric Mean LOS (GMLOS) (days): 3 50  Days to GMLOS:0 1     OBJECTIVE:  Risk of Unplanned Readmission Score: 10         Current admission status: Inpatient   Preferred Pharmacy:   [facility] [address] [place] [address]  Phone: [phone] Fax: [place] [facility] [address]  Phone: [phone] Fax: [phone]    Primary Care Provider: Lamar Acosta MD    Primary Insurance: MEDICARE  Secondary Insurance: [facility]    DISCHARGE DETAILS:     CM followed up with those facilities that referrals had been made to requesting they review and provide a determination for acceptance  Cm will continue to follow

## 2022-04-25 NOTE — PROGRESS NOTES
Progress Note - Infectious Disease   Akanksha Solanoogren 80 y o  female MRN: [id number]  Unit/Bed#: S -01 Encounter: [id number]    Assessment:  1  Severe sepsis, POA- fever, tachycardia, lactic acidosis  Due to acute cholangitis ?    UA, COVID, chest x-ray, CT AP unremarkable  Afebrile, no leukocytosis  Sepsis resolved  Continue antibiotics as below    2  E coli bacteremia      Suspected source biliary tract ( patient admitted with fever, abdominal pain, transaminates, pneumobilia per imaging)  UA unremarkable  Started on ceftriaxone upon admission  Currently patient has no abdominal pain, LFTs improved and has no fever  Plan:     Discontinue Ceftriaxone iv     Start Augmentin oral 500- 125 mg q8 h( covers for E coli also has some anaerobes activity)     Monitor VS, cbc      Continue Augmentin to complete a total of 7-10 days  3  Elevated LFTs       Hx of cholecystectomy  CTA/P, ADALI US showed possible pneumobilia, without choledocholithiasis or biliary dilatation  LFTs improved  Subjective/Objective     Subjective: patient was resting comfortably on her recliner in no distress  Good appetite, no nausea, vomiting, diarrhea  Denied chills, fever       Temp:  [97 8 °F (36 6 °C)-98 1 °F (36 7 °C)] 98 1 °F (36 7 °C)  HR:  [88-93] 88  Resp:  [15-18] 18  BP: (130-158)/(64-80) 158/80  SpO2:  [94 %-96 %] 96 %  Temp (24hrs), Av 9 °F (36 6 °C), Min:97 8 °F (36 6 °C), Max:98 1 °F (36 7 °C)  Current: Temperature: 98 1 °F (36 7 °C)    Physical Exam  Vitals reviewed  Constitutional:       General: She is not in acute distress  Appearance: She is not diaphoretic  Eyes:      General:         Right eye: No discharge  Left eye: No discharge  Cardiovascular:      Rate and Rhythm: Normal rate and regular rhythm  Heart sounds: Murmur heard  Pulmonary:      Effort: Pulmonary effort is normal  No respiratory distress  Breath sounds: No wheezing or rales     Abdominal:      General: There is no distension  Palpations: Abdomen is soft  Tenderness: There is no abdominal tenderness  There is no guarding or rebound  Musculoskeletal:      Right lower leg: No edema  Left lower leg: No edema  Skin:     General: Skin is warm  Neurological:      Mental Status: She is alert  Psychiatric:         Mood and Affect: Mood normal          Behavior: Behavior normal          Thought Content: Thought content normal          Judgment: Judgment normal          Invasive Devices  Report    Peripheral Intravenous Line            Peripheral IV 04/21/22 Left;Dorsal (posterior) Forearm 3 days                Lab, Imaging and other studies: I have personally reviewed pertinent reports

## 2022-04-25 NOTE — PLAN OF CARE
Pt is in bed, awake, A&O x 4  Compliant with medications and morning routine  Transferred to recliner with assist x 1 and walker  Tolerated well  Will continue to monitor

## 2022-04-25 NOTE — PLAN OF CARE
Problem: PHYSICAL THERAPY ADULT  Goal: Performs mobility at highest level of function for planned discharge setting  See evaluation for individualized goals  Description: Treatment/Interventions: Functional transfer training,LE strengthening/ROM,Therapeutic exercise,Endurance training,Cognitive reorientation,Patient/family training,Equipment eval/education,Bed mobility,Gait training          See flowsheet documentation for full assessment, interventions and recommendations  Outcome: Progressing  Note: Prognosis: Fair  Problem List: Decreased strength,Decreased range of motion,Decreased endurance,Decreased mobility,Impaired balance,Decreased cognition,Decreased safety awareness,Impaired vision,Impaired hearing,Decreased skin integrity,Pain  Assessment: Pt agrees to PT treatment and is very pleasant and cooperative throughout session  Progress noted this session and is able to ambulate increased distance with use of RW  Gait degradation noted with increasing distance  Significant amount of assist required for toilet transfer and requires consistent verbal cues for safety awareness and hand placement for RW  Will continue to benefit from ongoing skilled PT to maximize her functional mobility and increase her level of independence  PT Discharge Recommendation: Post acute rehabilitation services          See flowsheet documentation for full assessment

## 2022-04-25 NOTE — PLAN OF CARE
Problem: OCCUPATIONAL THERAPY ADULT  Goal: Performs self-care activities at highest level of function for planned discharge setting  See evaluation for individualized goals  Description: Treatment Interventions: ADL retraining,Functional transfer training,UE strengthening/ROM,Endurance training,Cognitive reorientation,Patient/family training,Equipment evaluation/education,Compensatory technique education,Continued evaluation          See flowsheet documentation for full assessment, interventions and recommendations  Note: Limitation: Decreased ADL status,Decreased UE strength,Decreased UE ROM,Decreased Safe judgement during ADL,Decreased endurance,Decreased self-care trans,Decreased high-level ADLs  Prognosis: Fair  Assessment: Pt is a 80 y o  female seen for OT evaluation s/p admit [facility] on 4/21/2022 w/ Sepsis ([address] )  Comorbidities affecting pt's functional performance at time of assessment include: HTN, type 2 DM, transaminitis, bacteremia  Personal factors affecting pt at time of IE include:difficulty performing ADLS and difficulty performing IADLS   Prior to admission, pt was assisted with ADLs by her family and was mod I for functional mobility with a RW  Upon evaluation: Pt requires min A for functional mobility, mod A for toilet transfers and mod to max A for ADLs 2* the following deficits impacting occupational performance: weakness, decreased strength, decreased balance, decreased tolerance, impaired sequencing and impaired problem solving  Pt to benefit from continued skilled OT tx while in the hospital to address deficits as defined above and maximize level of functional independence w ADL's and functional mobility  Occupational Performance areas to address include: grooming, bathing/shower, toilet hygiene, dressing, functional mobility and community mobility  From OT standpoint, recommendation at time of d/c would be post acute rehab        OT Discharge Recommendation: Post acute rehabilitation services

## 2022-04-25 NOTE — ASSESSMENT & PLAN NOTE
POA: Sepsis as evidenced by fever 101 7, tachycardia 98, leukocytosis 11 06     /80   Pulse 88   Temp 98 1 °F (36 7 °C)   Resp 18   Ht 5' 1" (1 549 m)   Wt 59 9 kg (132 lb)   LMP  (LMP Unknown)   SpO2 96%   BMI 24 94 kg/m²     CT abdomen pelvis with contrast was unremarkable  Ultrasound right upper quadrant shows pneumobilia, normal common bile duct  Chest x-ray unremarkable  Urinalysis unremarkable  Patient had 2/2 E coli on blood cultures  Leukocytosis resolved, lactic acidosis resolved  Echocardiogram - EF 70%  Grade 1 diastolic dysfunction  Aortic valve moderate stenosis  ID on board recommending switching ceftriaxone to Augmentin 500-125 mg q 8    Plan:   Abx:  Start Augmentin Q8 to complete a total of 10 day course of antibiotic   Encourage oral hydration   Continue to follow culture sensitivities   Post acute rehab was recommended  Case management work in progress to find placement

## 2022-04-25 NOTE — CASE MANAGEMENT
Case Management Discharge Planning Note    Patient name [facility]  Location S /S -01 MRN [id number]  : 1918 Date 2022       Current Admission Date: 2022  Current Admission Diagnosis:Sepsis [facility])   Patient Active Problem List    Diagnosis Date Noted    Transaminitis 2022    Bacteremia 2022    [facility] ) 20[facility]    Paroxysmal atrial fibrillation ([facility] ) 10/16/2020    Essential hypertension 10/22/2019    Primary osteoarthritis of left hip 10/22/2019    Ambulatory dysfunction 10/22/2019    Advanced age 10/22/2019    Type 2 diabetes mellitus without complication, without long-term current use of insulin ([facility] ) 10/22/2019    ARMD (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 degeneration) 10/22/2019    Left sided lacunar infarction ([facility] ) 10/22/2019    Primary osteoarthritis of right knee 10/22/2019      LOS (days): 4  Geometric Mean LOS (GMLOS) (days): 3 50  Days to GMLOS:-0 2     OBJECTIVE:  Risk of Unplanned Readmission Score: 10         Current admission status: Inpatient   Preferred Pharmacy:   [facility]  [address] [place] [address]  Phone: [phone] Fax: [phone]  [address] [place] [id number]  Phone: [phone] Fax: [phone]    Primary Care Provider: Franko Sanchez MD    Primary Insurance: MEDICARE  Secondary Insurance: [facility]    DISCHARGE DETAILS:     CM met with pt and daughter and XU to revie options for rehab  At this time [facility] is able to accept pt  Family was hoping to hear from [facility] to see if they have a bed  CM attempted to contact them all day however was not able to get in touch with anyone from Admissions  At this time, family would likr to wait to see what [facility] has to say and determine where they would like to have pt go tomorrow morning  Daughter states she will be here at 0900  CM will continue to follow

## 2022-04-25 NOTE — PLAN OF CARE
Problem: Potential for Falls  Goal: Patient will remain free of falls  Description: INTERVENTIONS:  - Educate patient/family on patient safety including physical limitations  - Instruct patient to call for assistance with activity   - Consult OT/PT to assist with strengthening/mobility   - Keep Call bell within reach  - Keep bed low and locked with side rails adjusted as appropriate  - Keep care items and personal belongings within reach  - Initiate and maintain comfort rounds  - Make Fall Risk Sign visible to staff  - Offer Toileting every 2 Hours, in advance of need  - Initiate/Maintain bed alarm  - Obtain necessary fall risk management equipment  - Apply yellow socks and bracelet for high fall risk patients  - Consider moving patient to room near nurses station  Outcome: Progressing     Problem: Prexisting or High Potential for Compromised Skin Integrity  Goal: Skin integrity is maintained or improved  Description: INTERVENTIONS:  - Identify patients at risk for skin breakdown  - Assess and monitor skin integrity  - Assess and monitor nutrition and hydration status  - Monitor labs   - Assess for incontinence   - Turn and reposition patient  - Assist with mobility/ambulation  - Relieve pressure over bony prominences  - Avoid friction and shearing  - Provide appropriate hygiene as needed including keeping skin clean and dry  - Evaluate need for skin moisturizer/barrier cream  - Collaborate with interdisciplinary team   - Patient/family teaching  - Consider wound care consult   Outcome: Progressing     Problem: MOBILITY - ADULT  Goal: Maintain or return to baseline ADL function  Description: INTERVENTIONS:  -  Assess patient's ability to carry out ADLs; assess patient's baseline for ADL function and identify physical deficits which impact ability to perform ADLs (bathing, care of mouth/teeth, toileting, grooming, dressing, etc )  - Assess/evaluate cause of self-care deficits   - Assess range of motion  - Assess patient's mobility; develop plan if impaired  - Assess patient's need for assistive devices and provide as appropriate  - Encourage maximum independence but intervene and supervise when necessary  - Involve family in performance of ADLs  - Assess for home care needs following discharge   - Consider OT consult to assist with ADL evaluation and planning for discharge  - Provide patient education as appropriate  Outcome: Progressing  Goal: Maintains/Returns to pre admission functional level  Description: INTERVENTIONS:  - Perform BMAT or MOVE assessment daily    - Set and communicate daily mobility goal to care team and patient/family/caregiver  - Collaborate with rehabilitation services on mobility goals if consulted  - Perform Range of Motion 4 times a day  - Reposition patient every 2 hours    - Dangle patient 3 times a day  - Stand patient 3 times a day  - Ambulate patient 3 times a day  - Out of bed to chair 3 times a day   - Out of bed for meals 3 times a day  - Out of bed for toileting  - Record patient progress and toleration of activity level   Outcome: Progressing

## 2022-04-25 NOTE — ASSESSMENT & PLAN NOTE
Recent Labs     04/23/22  0827 04/23/22  0828 04/25/22  0544   * 337* 47*   * 866* 341*   ALKPHOS 410* 407* 254*   TBILI 0 35 0 33 0 27     Acetaminophen:  Daily use unknown dosage  Acetaminophen level 18 5    Imaging  · CT Abd/Pev:  Unremarkable  · U/S:  Pending  There is a mixed pattern in terms of the transaminitis  Status post acetylcysteine infusion in the emergency department  Etiologies:  Most likely secondary to hepatic infarction, versus hypoperfusion from sepsis  Need to rule out viral etiology     CMV, EBV, IgG positive, IgM negative     Plan:  · A m  CMP  · GI on board  · Follow Hepatitis panel, HSV type 1, 2 PCR,

## 2022-04-25 NOTE — PROGRESS NOTES
[facility]  Progress Note Felicie Galeazzi 7/19/1918, 80 y o  female MRN: [id number]  Unit/Bed#: S -01 Encounter: [id number]  Primary Care Provider: Lion Richardson MD   Date and time admitted to hospital: 4/21/2022  7:24 PM    * Sepsis [facility])  Assessment & Plan  POA: Sepsis as evidenced by fever 101 7, tachycardia 98, leukocytosis 11 06     /80   Pulse 88   Temp 98 1 °F (36 7 °C)   Resp 18   Ht 5' 1" (1 549 m)   Wt 59 9 kg (132 lb)   LMP  (LMP Unknown)   SpO2 96%   BMI 24 94 kg/m²     CT abdomen pelvis with contrast was unremarkable  Ultrasound right upper quadrant shows pneumobilia, normal common bile duct  Chest x-ray unremarkable  Urinalysis unremarkable  Patient had 2/2 E coli on blood cultures  Leukocytosis resolved, lactic acidosis resolved  Echocardiogram - EF 70%  Grade 1 diastolic dysfunction  Aortic valve moderate stenosis  ID on board recommending switching ceftriaxone to Augmentin 500-125 mg q 8    Plan:   Abx:  Start Augmentin Q8 to complete a total of 10 day course of antibiotic   Encourage oral hydration   Continue to follow culture sensitivities   Post acute rehab was recommended  Case management work in progress to find placement  Bacteremia  Assessment & Plan  2/2 E coli bacteremia  No clear source for now  ID on board possible MRCP  However risk versus benefit is being discussed with patient  Patient's family believes that if something is found with MRCP, with her advanced age, will it be worth doing anything  Patients family notified that patient is clinically improving  No more fever, white count trending down, blood pressure stable  Plan:  Plan as per above      Transaminitis  Assessment & Plan  Recent Labs     04/23/22  0827 04/23/22  0828 04/25/22  0544   * 337* 47*   * 866* 341*   ALKPHOS 410* 407* 254*   TBILI 0 35 0 33 0 27     Acetaminophen:  Daily use unknown dosage    Acetaminophen level 18 5    Imaging  · CT Abd/Pev:  Unremarkable  · U/S:  Pending  There is a mixed pattern in terms of the transaminitis  Status post acetylcysteine infusion in the emergency department  Etiologies:  Most likely secondary to hepatic infarction, versus hypoperfusion from sepsis  Need to rule out viral etiology  CMV, EBV, IgG positive, IgM negative     Plan:  · A m  CMP  · GI on board  · Follow Hepatitis panel, HSV type 1, 2 PCR,               Type 2 diabetes mellitus without complication, without long-term current use of insulin [facility])  Assessment & Plan  Lab Results   Component Value Date    HGBA1C 5 5 2022     Recent Labs     22  1617 22  0712 22  1131   POCGLU 118 95 115 114   Blood Sugar Average: Last 72 hrs:  · (P) 707 [id number]     Plan  · Hold Janumet  · Insulin sliding scale, POCT  · Diabetic diet    Essential hypertension  Assessment & Plan  · Blood pressure acceptable    Plan:  · Continue amlodipine 5 mg daily  · Continue verapamil 180 mg daily  · Continue lisinopril 20 mg b i d  VTE Pharmacologic Prophylaxis: VTE Score: 8 High Risk (Score >/= 5) - Pharmacological DVT Prophylaxis Ordered: enoxaparin (Lovenox)  Sequential Compression Devices Ordered  Patient Centered Rounds: I performed bedside rounds with nursing staff today  Discussions with Specialists or Other Care Team Provider:  Followed ID recommendation    Education and Discussions with Family / Patient: Updated  (daughter) at bedside  Current Length of Stay: 4 day(s)  Current Patient Status: Inpatient   Discharge Plan: Anticipate discharge tomorrow to rehab facility  Code Status: Level 1 - Full Code    Subjective:   Patient reported feeling well  Denied acute events overnight    Denied chills, fever, abdominal pain, urinary issues S, pain    Objective:     Vitals:   Temp (24hrs), Av °F (36 7 °C), Min:97 9 °F (36 6 °C), Max:98 1 °F (36 7 °C)    Temp:  [97 9 °F (36 6 °C)-98 1 °F (36 7 °C)] 98 1 °F (36 7 °C)  HR:  [88-90] 88  Resp:  [15-18] 18  BP: (130-158)/(64-80) 158/80  SpO2:  [95 %-96 %] 96 %  Body mass index is 24 94 kg/m²  Input and Output Summary (last 24 hours): Intake/Output Summary (Last 24 hours) at 4/25/2022 1507  Last data filed at 4/24/2022 2213  Gross per 24 hour   Intake 50 ml   Output 250 ml   Net -200 ml       Physical Exam:   Physical Exam  Vitals and nursing note reviewed  Constitutional:       General: She is not in acute distress  Appearance: She is well-developed  She is not ill-appearing  HENT:      Head: Normocephalic and atraumatic  Eyes:      Conjunctiva/sclera: Conjunctivae normal    Cardiovascular:      Rate and Rhythm: Normal rate and regular rhythm  Heart sounds: No murmur heard  Pulmonary:      Effort: Pulmonary effort is normal  No respiratory distress  Breath sounds: Normal breath sounds  Abdominal:      General: Bowel sounds are normal       Palpations: Abdomen is soft  Tenderness: There is no abdominal tenderness  Musculoskeletal:      Cervical back: Neck supple  Right lower leg: No edema  Left lower leg: No edema  Skin:     General: Skin is warm and dry  Findings: Bruising (Of the right hand, unclear if it is in the setting of blood draw manipulation ) present  Neurological:      General: No focal deficit present  Mental Status: She is alert and oriented to person, place, and time  Mental status is at baseline  Additional Data:     Labs:  Results from last 7 days   Lab Units 04/25/22  0544 04/22/22  0522 04/22/22  0521   WBC Thousand/uL 7 92   < > 15 47*   HEMOGLOBIN g/dL 10 2*   < > 10 2*   HEMATOCRIT % 32 6*   < > 32 5*   PLATELETS Thousands/uL 376   < > 332   NEUTROS PCT %  --   --  87*   LYMPHS PCT %  --   --  5*   MONOS PCT %  --   --  7   EOS PCT %  --   --  0    < > = values in this interval not displayed       Results from last 7 days   Lab Units 04/25/22 0544 SODIUM mmol/L 139   POTASSIUM mmol/L 4 1   CHLORIDE mmol/L 106   CO2 mmol/L 25   BUN mg/dL 19   CREATININE mg/dL 0 60   ANION GAP mmol/L 8   CALCIUM mg/dL 8 3   ALBUMIN g/dL 2 5*   TOTAL BILIRUBIN mg/dL 0 27   ALK PHOS U/L 254*   ALT U/L 341*   AST U/L 47*   GLUCOSE RANDOM mg/dL 103     Results from last 7 days   Lab Units 04/23/22  0829   INR  1 05     Results from last 7 days   Lab Units 04/25/22  1131 04/25/22  0712 04/24/22 2013 04/24/22  1617 04/24/22  1056 04/24/22  0702 04/23/22  2111 04/23/22  1535 04/23/22  1105 04/23/22  0731 04/22/22  2148 04/22/22  1611   POC GLUCOSE mg/dl 114 115 95 118 139 112 121 98 177* 118 130 125     Results from last 7 days   Lab Units 04/21/22  1954   HEMOGLOBIN A1C % 5 5     Results from last 7 days   Lab Units 04/22/22  0728 04/22/22  0014 04/21/22  2158 04/21/22 2021   LACTIC ACID mmol/L  --  1 3 3 3* 5 6*   PROCALCITONIN ng/ml 11 76*  --   --   --        Lines/Drains:  Invasive Devices  Report    Peripheral Intravenous Line            Peripheral IV 04/21/22 Left;Dorsal (posterior) Forearm 3 days                      Imaging: Reviewed radiology reports from this admission including: chest xray, abdominal/pelvic CT and ultrasound(s)    Recent Cultures (last 7 days):   Results from last 7 days   Lab Units 04/21/22 2021   BLOOD CULTURE  Escherichia coli*  Escherichia coli*   GRAM STAIN RESULT  Gram negative rods*  Gram negative rods*       Last 24 Hours Medication List:   Current Facility-Administered Medications   Medication Dose Route Frequency Provider Last Rate    amLODIPine  5 mg Oral Daily Vanessa Medrano DO      amoxicillin-clavulanate  1 tablet Oral Q8H [address] Gisela Sheets DO      enoxaparin  30 mg Subcutaneous Daily Andry Bustos DO      insulin lispro  1-5 Units Subcutaneous TID AC Andry Bustos DO      lisinopril  20 mg Oral BID Vanessa Medrano DO      verapamil  180 mg Oral Daily Vanessa Medrano DO          Today, Patient Was Seen By: Fay Davidson MD    **Please Note: This note may have been constructed using a voice recognition system  **

## 2022-04-25 NOTE — ASSESSMENT & PLAN NOTE
Lab Results   Component Value Date    HGBA1C 5 5 04/21/2022     Recent Labs     04/24/22  1617 04/24/22  2013 04/25/22  0712 04/25/22  1131   POCGLU 118 95 115 114   Blood Sugar Average: Last 72 hrs:  · (P) 343 [id number]     Plan  · Hold Janumet    · Insulin sliding scale, POCT  · Diabetic diet

## 2022-04-25 NOTE — ASSESSMENT & PLAN NOTE
2/2 E coli bacteremia  No clear source for now  ID on board possible MRCP  However risk versus benefit is being discussed with patient  Patient's family believes that if something is found with MRCP, with her advanced age, will it be worth doing anything  Patients family notified that patient is clinically improving  No more fever, white count trending down, blood pressure stable        Plan:  Plan as per above

## 2022-04-26 LAB
ALBUMIN SERPL BCP-MCNC: 2.5 G/DL (ref 3.5–5)
ALP SERPL-CCNC: 220 U/L (ref 46–116)
ALT SERPL W P-5'-P-CCNC: 246 U/L (ref 12–78)
ANION GAP SERPL CALCULATED.3IONS-SCNC: 6 MMOL/L (ref 4–13)
AST SERPL W P-5'-P-CCNC: 33 U/L (ref 5–45)
BASOPHILS # BLD AUTO: 0.07 THOUSANDS/ΜL (ref 0–0.1)
BASOPHILS NFR BLD AUTO: 1 % (ref 0–1)
BILIRUB SERPL-MCNC: 0.27 MG/DL (ref 0.2–1)
BUN SERPL-MCNC: 24 MG/DL (ref 5–25)
CALCIUM ALBUM COR SERPL-MCNC: 9.4 MG/DL (ref 8.3–10.1)
CALCIUM SERPL-MCNC: 8.2 MG/DL (ref 8.3–10.1)
CHLORIDE SERPL-SCNC: 106 MMOL/L (ref 100–108)
CO2 SERPL-SCNC: 26 MMOL/L (ref 21–32)
CREAT SERPL-MCNC: 0.65 MG/DL (ref 0.6–1.3)
EOSINOPHIL # BLD AUTO: 0.22 THOUSAND/ΜL (ref 0–0.61)
EOSINOPHIL NFR BLD AUTO: 3 % (ref 0–6)
ERYTHROCYTE [DISTWIDTH] IN BLOOD BY AUTOMATED COUNT: 13.6 % (ref 11.6–15.1)
FLUAV RNA RESP QL NAA+PROBE: NEGATIVE
FLUBV RNA RESP QL NAA+PROBE: NEGATIVE
GFR SERPL CREATININE-BSD FRML MDRD: 71 ML/MIN/1.73SQ M
GLUCOSE SERPL-MCNC: 102 MG/DL (ref 65–140)
GLUCOSE SERPL-MCNC: 103 MG/DL (ref 65–140)
GLUCOSE SERPL-MCNC: 110 MG/DL (ref 65–140)
GLUCOSE SERPL-MCNC: 117 MG/DL (ref 65–140)
GLUCOSE SERPL-MCNC: 118 MG/DL (ref 65–140)
HCT VFR BLD AUTO: 32.1 % (ref 34.8–46.1)
HGB BLD-MCNC: 9.9 G/DL (ref 11.5–15.4)
IMM GRANULOCYTES # BLD AUTO: 0.08 THOUSAND/UL (ref 0–0.2)
IMM GRANULOCYTES NFR BLD AUTO: 1 % (ref 0–2)
LYMPHOCYTES # BLD AUTO: 1.37 THOUSANDS/ΜL (ref 0.6–4.47)
LYMPHOCYTES NFR BLD AUTO: 19 % (ref 14–44)
MCH RBC QN AUTO: 30.2 PG (ref 26.8–34.3)
MCHC RBC AUTO-ENTMCNC: 30.8 G/DL (ref 31.4–37.4)
MCV RBC AUTO: 98 FL (ref 82–98)
MONOCYTES # BLD AUTO: 1.05 THOUSAND/ΜL (ref 0.17–1.22)
MONOCYTES NFR BLD AUTO: 14 % (ref 4–12)
NEUTROPHILS # BLD AUTO: 4.5 THOUSANDS/ΜL (ref 1.85–7.62)
NEUTS SEG NFR BLD AUTO: 62 % (ref 43–75)
NRBC BLD AUTO-RTO: 0 /100 WBCS
PLATELET # BLD AUTO: 380 THOUSANDS/UL (ref 149–390)
PMV BLD AUTO: 9.2 FL (ref 8.9–12.7)
POTASSIUM SERPL-SCNC: 4.5 MMOL/L (ref 3.5–5.3)
PROT SERPL-MCNC: 6.1 G/DL (ref 6.4–8.2)
RBC # BLD AUTO: 3.28 MILLION/UL (ref 3.81–5.12)
RSV RNA RESP QL NAA+PROBE: NEGATIVE
SARS-COV-2 RNA RESP QL NAA+PROBE: NEGATIVE
SODIUM SERPL-SCNC: 138 MMOL/L (ref 136–145)
WBC # BLD AUTO: 7.29 THOUSAND/UL (ref 4.31–10.16)

## 2022-04-26 PROCEDURE — 82948 REAGENT STRIP/BLOOD GLUCOSE: CPT

## 2022-04-26 PROCEDURE — 80053 COMPREHEN METABOLIC PANEL: CPT

## 2022-04-26 PROCEDURE — 0241U HB NFCT DS VIR RESP RNA 4 TRGT: CPT

## 2022-04-26 PROCEDURE — 99232 SBSQ HOSP IP/OBS MODERATE 35: CPT | Performed by: INTERNAL MEDICINE

## 2022-04-26 PROCEDURE — 85025 COMPLETE CBC W/AUTO DIFF WBC: CPT

## 2022-04-26 RX ORDER — AMOXICILLIN AND CLAVULANATE POTASSIUM 500; 125 MG/1; MG/1
1 TABLET, FILM COATED ORAL EVERY 8 HOURS SCHEDULED
Qty: 13 TABLET | Refills: 0 | Status: SHIPPED | OUTPATIENT
Start: 2022-04-26 | End: 2022-04-27

## 2022-04-26 RX ORDER — LOPERAMIDE HYDROCHLORIDE 2 MG/1
2 CAPSULE ORAL 3 TIMES DAILY
Status: DISCONTINUED | OUTPATIENT
Start: 2022-04-26 | End: 2022-04-26

## 2022-04-26 RX ADMIN — VERAPAMIL HYDROCHLORIDE 180 MG: 180 TABLET ORAL at 10:10

## 2022-04-26 RX ADMIN — ENOXAPARIN SODIUM 30 MG: 40 INJECTION SUBCUTANEOUS at 10:11

## 2022-04-26 RX ADMIN — LISINOPRIL 20 MG: 20 TABLET ORAL at 17:11

## 2022-04-26 RX ADMIN — LISINOPRIL 20 MG: 20 TABLET ORAL at 10:10

## 2022-04-26 RX ADMIN — LOPERAMIDE HYDROCHLORIDE 2 MG: 2 CAPSULE ORAL at 13:18

## 2022-04-26 RX ADMIN — AMOXICILLIN AND CLAVULANATE POTASSIUM 1 TABLET: 500; 125 TABLET, FILM COATED ORAL at 17:11

## 2022-04-26 RX ADMIN — AMOXICILLIN AND CLAVULANATE POTASSIUM 1 TABLET: 500; 125 TABLET, FILM COATED ORAL at 02:21

## 2022-04-26 RX ADMIN — AMOXICILLIN AND CLAVULANATE POTASSIUM 1 TABLET: 500; 125 TABLET, FILM COATED ORAL at 10:10

## 2022-04-26 RX ADMIN — AMLODIPINE BESYLATE 5 MG: 5 TABLET ORAL at 10:09

## 2022-04-26 NOTE — PROGRESS NOTES
[facility]  Progress Note Mayte Soriano 7/19/1918, 80 y o  female MRN: [id number]  Unit/Bed#: S -01 Encounter: [id number]  Primary Care Provider: Carle Halsted, MD   Date and time admitted to hospital: 4/21/2022  7:24 PM    * Sepsis [facility])  Assessment & Plan  POA: Sepsis as evidenced by fever 101 7, tachycardia 98, leukocytosis 11 06     /96   Pulse 82   Temp 97 8 °F (36 6 °C)   Resp 18   Ht 5' 1" (1 549 m)   Wt 59 9 kg (132 lb)   LMP  (LMP Unknown)   SpO2 95%   BMI 24 94 kg/m²     CT abdomen pelvis with contrast was unremarkable  Ultrasound right upper quadrant shows pneumobilia, normal common bile duct  Chest x-ray unremarkable  Urinalysis unremarkable  Patient had 2/2 E coli on blood cultures  Pansensitive  Leukocytosis resolved, lactic acidosis resolved  Echocardiogram - EF 70%  Grade 1 diastolic dysfunction  Aortic valve moderate stenosis  ID on board recommending switching ceftriaxone to Augmentin 500-125 mg q 8    Plan:   Abx:  Started Augmentin Q8 to complete a total of 10 day course of antibiotic: started on 4/25 (Day 2/10)   Encourage oral hydration   Post acute rehab was recommended  Case management on board with placement   Likely DC tomorrow        Bacteremia  Assessment & Plan  2/2 E coli bacteremia  No clear source for now  ID on board possible MRCP  However risk versus benefit is being discussed with patient  Patient's family believes that if something is found with MRCP, with her advanced age, will it be worth doing anything  Patients family notified that patient is clinically improving  No more fever, white count trending down, blood pressure stable  Plan:  Plan as per above      Transaminitis  Assessment & Plan  Recent Labs     04/25/22  0544 04/26/22  0459   AST 47* 33   * 246*   ALKPHOS 254* 220*   TBILI 0 27 0 27     Acetaminophen:  Daily use unknown dosage    Acetaminophen level 18 5    Imaging  · CT Abd/Pev: Unremarkable  · U/S:  Pending  There is a mixed pattern in terms of the transaminitis  Status post acetylcysteine infusion in the emergency department  Etiologies:  Most likely secondary to hepatic infarction, versus hypoperfusion from sepsis  Need to rule out viral etiology  CMV, EBV, IgG positive, IgM negative     Plan:  · A m  CMP  · GI on board  · Follow Hepatitis panel, HSV type 1, 2 PCR,               Type 2 diabetes mellitus without complication, without long-term current use of insulin [facility])  Assessment & Plan  Lab Results   Component Value Date    HGBA1C 5 5 2022     Recent Labs     22  2109 22  0658 22  1056 22  1536   POCGLU 115 117 110 118   Blood Sugar Average: Last 72 hrs:  · (P) 117 6     Plan  · Hold Janumet  · Insulin sliding scale, POCT  · Diabetic diet    Essential hypertension  Assessment & Plan  · Blood pressure acceptable    Plan:  · Continue amlodipine 5 mg daily  · Continue verapamil 180 mg daily  · Continue lisinopril 20 mg b i d  VTE Pharmacologic Prophylaxis: VTE Score: 8 High Risk (Score >/= 5) - Pharmacological DVT Prophylaxis Ordered: enoxaparin (Lovenox)  Sequential Compression Devices Ordered  Patient Centered Rounds: I performed bedside rounds with nursing staff today  Discussions with Specialists or Other Care Team Provider: STACY, ID    Education and Discussions with Family / Patient: Updated  (daughter) via phone  Current Length of Stay: 5 day(s)  Current Patient Status: Inpatient   Discharge Plan: Anticipate discharge tomorrow to rehab facility  Code Status: Level 1 - Full Code    Subjective:   Patient was resting in the chair  Denies acute events overnight  Denies shortness of breast, chills, fever, abdominal pain, urinary issues, constipation    Objective:     Vitals:   Temp (24hrs), Av 2 °F (36 8 °C), Min:97 8 °F (36 6 °C), Max:99 °F (37 2 °C)    Temp:  [97 8 °F (36 6 °C)-99 °F (37 2 °C)] 97 8 °F (36 6 °C)  HR: [82-93] 82  Resp:  [18] 18  BP: (119-163)/(66-96) 119/96  SpO2:  [93 %-96 %] 95 %  Body mass index is 24 94 kg/m²  Input and Output Summary (last 24 hours): Intake/Output Summary (Last 24 hours) at 4/26/2022 1704  Last data filed at 4/26/2022 0900  Gross per 24 hour   Intake 240 ml   Output 100 ml   Net 140 ml       Physical Exam:   Physical Exam  Vitals and nursing note reviewed  Constitutional:       General: She is not in acute distress  Appearance: Normal appearance  She is well-developed  HENT:      Head: Normocephalic and atraumatic  Ears:      Comments: Trouble hearing  Eyes:      Conjunctiva/sclera: Conjunctivae normal    Cardiovascular:      Rate and Rhythm: Normal rate and regular rhythm  Heart sounds: No murmur heard  Pulmonary:      Effort: Pulmonary effort is normal  No respiratory distress  Breath sounds: Normal breath sounds  Abdominal:      General: Bowel sounds are normal       Palpations: Abdomen is soft  Tenderness: There is no abdominal tenderness  Musculoskeletal:      Cervical back: Neck supple  Right lower leg: No edema  Left lower leg: No edema  Skin:     General: Skin is warm and dry  Neurological:      General: No focal deficit present  Mental Status: She is alert and oriented to person, place, and time  Mental status is at baseline  Psychiatric:         Mood and Affect: Mood normal          Behavior: Behavior normal          Thought Content:  Thought content normal          Judgment: Judgment normal          Additional Data:     Labs:  Results from last 7 days   Lab Units 04/26/22  0459   WBC Thousand/uL 7 29   HEMOGLOBIN g/dL 9 9*   HEMATOCRIT % 32 1*   PLATELETS Thousands/uL 380   NEUTROS PCT % 62   LYMPHS PCT % 19   MONOS PCT % 14*   EOS PCT % 3     Results from last 7 days   Lab Units 04/26/22  0459   SODIUM mmol/L 138   POTASSIUM mmol/L 4 5   CHLORIDE mmol/L 106   CO2 mmol/L 26   BUN mg/dL 24   CREATININE mg/dL 0 65 ANION GAP mmol/L 6   CALCIUM mg/dL 8 2*   ALBUMIN g/dL 2 5*   TOTAL BILIRUBIN mg/dL 0 27   ALK PHOS U/L 220*   ALT U/L 246*   AST U/L 33   GLUCOSE RANDOM mg/dL 103     Results from last 7 days   Lab Units 04/23/22  0829   INR  1 05     Results from last 7 days   Lab Units 04/26/22  1536 04/26/22  1056 04/26/22  0658 04/25/22  2109 04/25/22  1625 04/25/22  1131 04/25/22  0712 04/24/22 2013 04/24/22  1617 04/24/22  1056 04/24/22  0702 04/23/22 2111   POC GLUCOSE mg/dl 118 110 117 115 97 114 115 95 118 139 112 121     Results from last 7 days   Lab Units 04/21/22  1954   HEMOGLOBIN A1C % 5 5     Results from last 7 days   Lab Units 04/22/22  0728 04/22/22  0014 04/21/22  2158 04/21/22 2021   LACTIC ACID mmol/L  --  1 3 3 3* 5 6*   PROCALCITONIN ng/ml 11 76*  --   --   --        Lines/Drains:  Invasive Devices  Report    Peripheral Intravenous Line            Peripheral IV 04/21/22 Left;Dorsal (posterior) Forearm 4 days              Imaging: No pertinent imaging reviewed  Recent Cultures (last 7 days):   Results from last 7 days   Lab Units 04/21/22 2021   BLOOD CULTURE  Escherichia coli*  Escherichia coli*   GRAM STAIN RESULT  Gram negative rods*  Gram negative rods*       Last 24 Hours Medication List:   Current Facility-Administered Medications   Medication Dose Route Frequency Provider Last Rate    amLODIPine  5 mg Oral Daily Peter Grove DO      amoxicillin-clavulanate  1 tablet Oral [facility] Gisela Sheets DO      enoxaparin  30 mg Subcutaneous Daily Andry Bustos DO      insulin lispro  1-5 Units Subcutaneous TID AC Andry Bustos DO      lisinopril  20 mg Oral BID [place], DO      verapamil  180 mg Oral Daily [place],           Today, Patient Was Seen By: Laurie Hardwick MD    **Please Note: This note may have been constructed using a voice recognition system  **

## 2022-04-26 NOTE — ASSESSMENT & PLAN NOTE
POA: Sepsis as evidenced by fever 101 7, tachycardia 98, leukocytosis 11 06     /96   Pulse 82   Temp 97 8 °F (36 6 °C)   Resp 18   Ht 5' 1" (1 549 m)   Wt 59 9 kg (132 lb)   LMP  (LMP Unknown)   SpO2 95%   BMI 24 94 kg/m²     CT abdomen pelvis with contrast was unremarkable  Ultrasound right upper quadrant shows pneumobilia, normal common bile duct  Chest x-ray unremarkable  Urinalysis unremarkable  Patient had 2/2 E coli on blood cultures  Pansensitive  Leukocytosis resolved, lactic acidosis resolved  Echocardiogram - EF 70%  Grade 1 diastolic dysfunction  Aortic valve moderate stenosis  ID on board recommending switching ceftriaxone to Augmentin 500-125 mg q 8    Plan:   Abx:  Continue Augmentin Q8 to complete a total of 10 day course of antibiotic:  (Day 6/10)   Encourage oral hydration   Post acute rehab was recommended  Case management on board with placement     Likely DC tomorrow

## 2022-04-26 NOTE — ASSESSMENT & PLAN NOTE
Lab Results   Component Value Date    HGBA1C 5 5 04/21/2022     Recent Labs     04/25/22  2109 04/26/22  0658 04/26/22  1056 04/26/22  1536   POCGLU 115 117 110 118   Blood Sugar Average: Last 72 hrs:  · (P) 117 6     Plan  · Hold Janumet    · Insulin sliding scale, POCT  · Diabetic diet

## 2022-04-26 NOTE — PLAN OF CARE
Problem: MOBILITY - ADULT  Goal: Maintain or return to baseline ADL function  Description: INTERVENTIONS:  -  Assess patient's ability to carry out ADLs; assess patient's baseline for ADL function and identify physical deficits which impact ability to perform ADLs (bathing, care of mouth/teeth, toileting, grooming, dressing, etc )  - Assess/evaluate cause of self-care deficits   - Assess range of motion  - Assess patient's mobility; develop plan if impaired  - Assess patient's need for assistive devices and provide as appropriate  - Encourage maximum independence but intervene and supervise when necessary  - Involve family in performance of ADLs  - Assess for home care needs following discharge   - Consider OT consult to assist with ADL evaluation and planning for discharge  - Provide patient education as appropriate  4/26/2022 0914 by Gillian Freitas  Outcome: Progressing  4/26/2022 0914 by Gillian Freitas  Outcome: Progressing  Goal: Maintains/Returns to pre admission functional level  Description: INTERVENTIONS:  - Perform BMAT or MOVE assessment daily    - Set and communicate daily mobility goal to care team and patient/family/caregiver  - Collaborate with rehabilitation services on mobility goals if consulted  - Perform Range of Motion 4 times a day  - Reposition patient every 2 hours    - Dangle patient 4 times a day  - Stand patient 4 times a day  - Ambulate patient 4 times a day  - Out of bed to chair 4 times a day   - Out of bed for meals 3 times a day  - Out of bed for toileting  - Record patient progress and toleration of activity level   4/26/2022 0914 by Gillian Freitas  Outcome: Progressing  4/26/2022 0914 by Gillian Freitas  Outcome: Progressing     Problem: INFECTION - ADULT  Goal: Absence or prevention of progression during hospitalization  Description: INTERVENTIONS:  - Assess and monitor for signs and symptoms of infection  - Monitor lab/diagnostic results  - Monitor all insertion sites, i e  indwelling lines, tubes, and drains  - Monitor endotracheal if appropriate and nasal secretions for changes in amount and color  - [place] appropriate cooling/warming therapies per order  - Administer medications as ordered  - Instruct and encourage patient and family to use good hand hygiene technique  - Identify and instruct in appropriate isolation precautions for identified infection/condition  Outcome: Progressing  Goal: Absence of fever/infection during neutropenic period  Description: INTERVENTIONS:  - Monitor WBC    Outcome: Progressing

## 2022-04-26 NOTE — PROGRESS NOTES
Progress Note - Infectious Disease   Flakito Forbes 80 y o  female MRN: [id number]  Unit/Bed#: S -01 Encounter: [id number]    Assessment:  1  Severe sepsis, POA- fever, tachycardia, lactic acidosis  Due to acute cholangitis ?    UA, COVID, chest x-ray, CT AP unremarkable  Afebrile, no leukocytosis  Sepsis resolved  Continue antibiotics as below     2  E coli bacteremia      Suspected source biliary tract ( patient admitted with fever, abdominal pain, transaminates, pneumobilia per imaging)  UA unremarkable  Started on ceftriaxone upon admission  Currently patient has no abdominal pain, LFTs improved and has no fever       Plan:     Continue Augmentin oral 500- 125 mg q8 h day 2 ( 6th day of antibiotics)     Monitor VS, cbc      Continue Augmentin oral to complete a total 10 days  Patient stable for discharge from ID standpoint      3  Elevated LFTs       Hx of cholecystectomy  CTA/P, ADALI US showed possible pneumobilia, without choledocholithiasis or biliary dilatation  LFTs slightly improved  Will discuss with my attending and will let primary team know about final plan  Subjective/Objective   Chief Complaint:     Subjective: patient resting in the recliner in no distress  Denied any chills, fever, nausea, vomiting, diarrhea, had no abdominal pain  Objective:     Temp:  [97 9 °F (36 6 °C)-99 °F (37 2 °C)] 97 9 °F (36 6 °C)  HR:  [81-93] 83  Resp:  [18] 18  BP: (151-163)/(66-89) 163/89  SpO2:  [93 %-96 %] 96 %  Temp (24hrs), Av 4 °F (36 9 °C), Min:97 9 °F (36 6 °C), Max:99 °F (37 2 °C)  Current: Temperature: 97 9 °F (36 6 °C)    Physical Exam  Vitals reviewed  Constitutional:       General: She is not in acute distress  Appearance: She is not diaphoretic  Eyes:      General:         Right eye: No discharge  Left eye: No discharge  Cardiovascular:      Rate and Rhythm: Normal rate and regular rhythm  Heart sounds: Murmur heard         Pulmonary:      Effort: No respiratory distress  Breath sounds: Normal breath sounds  No wheezing or rales  Abdominal:      General: There is no distension  Palpations: Abdomen is soft  Tenderness: There is no abdominal tenderness  There is no guarding or rebound  Musculoskeletal:      Cervical back: Normal range of motion  Right lower leg: No edema  Left lower leg: No edema  Skin:     General: Skin is warm  Findings: No rash  Neurological:      Mental Status: She is alert  Psychiatric:         Mood and Affect: Mood normal          Behavior: Behavior normal          Thought Content: Thought content normal          Judgment: Judgment normal          Invasive Devices  Report    Peripheral Intravenous Line            Peripheral IV 04/21/22 Left;Dorsal (posterior) Forearm 4 days                Lab, Imaging and other studies: I have personally reviewed pertinent reports

## 2022-04-26 NOTE — DISCHARGE INSTR - AVS FIRST PAGE
Dear Zhen Galloway,     It was our pleasure to care for you here at [facility]  It is our hope that we were always able to exceed the expected standards for your care during your stay  You were hospitalized due to   You were cared for on the 4th floor by Ruperto Ugalde MD under the service of Nadeen Higgins MD with the [facility] who covers for your primary care physician (PCP), Natasha Messer MD, while you were hospitalized  If you have any questions or concerns related to this hospitalization, you may contact us at [phone]  For follow up as well as any medication refills, we recommend that you follow up with your primary care physician  A registered nurse will reach out to you by phone within a few days after your discharge to answer any additional questions that you may have after going home  However, at this time we provide for you here, the most important instructions / recommendations at discharge:     Notable Medication Adjustments -   Please continue taking Augmentin 500-125 mg 2 times a day until 4/30 (being the last day)  Please start taking today the evening dose at 6:00 pm  Testing Required after Discharge -   Check CMP and CBC on Friday 4/29/22  Important follow up information -   Please follow up with your PCP in a week  Other Instructions -     Please review this entire after visit summary as additional general instructions including medication list, appointments, activity, diet, any pertinent wound care, and other additional recommendations from your care team that may be provided for you        Sincerely,     Ruperto Ugalde MD

## 2022-04-26 NOTE — CASE MANAGEMENT
Case Management Discharge Planning Note    Patient name Flakito Forbes  Location S /S -01 MRN [id number]  : 1918 Date 2022       Current Admission Date: 2022  Current Admission Diagnosis:Sepsis [facility])   Patient Active Problem List    Diagnosis Date Noted    Transaminitis 2022    Bacteremia 2022    Sepsis ([facility] [address] ) 2022    Paroxysmal atrial fibrillation ([facility] ) 10/16/2020    Essential hypertension 10/22/2019    Primary osteoarthritis of left hip 10/22/2019    Ambulatory dysfunction 10/22/2019    Advanced age 10/22/2019    Type 2 diabetes mellitus without complication, without long-term current use of insulin ([facility] ) 10/22/2019    ARMD (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 degeneration) 10/22/2019    [facility] 10/22/2019    Primary osteoarthritis of right knee 10/22/2019      LOS (days): 5  Geometric Mean LOS (GMLOS) (days): 3 50  Days to GMLOS:-1     OBJECTIVE:  Risk of Unplanned Readmission Score: 12         Current admission status: Inpatient   Preferred Pharmacy:   [facility] [address] [place] [address]  Phone: [phone] Fax: [address] [address]  Phone: [phone] Fax: [phone]    Primary Care Provider: Tushar Rawls MD    Primary Insurance: MEDICARE  Secondary Insurance: [facility]    DISCHARGE DETAILS:     [facility] is not able to take pt today as they had to make room changes for residents  They are able to accept pt tomorrow  CM reviewed other facilities however they have not gotten back to CM with bed availability  CM met with the daughters who stated they do not want pt going to another facility at this time  They prefer pt go to [facility]  They would like to wait until tomorrow for pt to be discharged to [facility] is aware family would still like to have pt come to them    They are able to accept pt tomorrow

## 2022-04-26 NOTE — ASSESSMENT & PLAN NOTE
Recent Labs     04/25/22  0544 04/26/22  0459   AST 47* 33   * 246*   ALKPHOS 254* 220*   TBILI 0 27 0 27     Acetaminophen:  Daily use unknown dosage  Acetaminophen level 18 5    Imaging  · CT Abd/Pev:  Unremarkable  · U/S:  Pending  There is a mixed pattern in terms of the transaminitis  Status post acetylcysteine infusion in the emergency department  Etiologies:  Most likely secondary to hepatic infarction, versus hypoperfusion from sepsis  Need to rule out viral etiology     CMV, EBV, IgG positive, IgM negative     Plan:  · A m  CMP  · GI on board  · Follow Hepatitis panel, HSV type 1, 2 PCR,

## 2022-04-27 VITALS
OXYGEN SATURATION: 95 % | HEART RATE: 85 BPM | WEIGHT: 132 LBS | TEMPERATURE: 98.9 F | RESPIRATION RATE: 18 BRPM | BODY MASS INDEX: 24.92 KG/M2 | DIASTOLIC BLOOD PRESSURE: 87 MMHG | HEIGHT: 61 IN | SYSTOLIC BLOOD PRESSURE: 166 MMHG

## 2022-04-27 LAB
BACTERIA BLD CULT: ABNORMAL
BACTERIA BLD CULT: ABNORMAL
E COLI DNA BLD POS QL NAA+NON-PROBE: DETECTED
ERYTHROCYTE [DISTWIDTH] IN BLOOD BY AUTOMATED COUNT: 13.6 % (ref 11.6–15.1)
GLUCOSE SERPL-MCNC: 101 MG/DL (ref 65–140)
GLUCOSE SERPL-MCNC: 105 MG/DL (ref 65–140)
GRAM STN SPEC: ABNORMAL
GRAM STN SPEC: ABNORMAL
HCT VFR BLD AUTO: 30.7 % (ref 34.8–46.1)
HGB BLD-MCNC: 10 G/DL (ref 11.5–15.4)
HSV1 DNA SPEC QL NAA+PROBE: NEGATIVE
HSV2 DNA SPEC QL NAA+PROBE: NEGATIVE
MCH RBC QN AUTO: 31.8 PG (ref 26.8–34.3)
MCHC RBC AUTO-ENTMCNC: 32.6 G/DL (ref 31.4–37.4)
MCV RBC AUTO: 98 FL (ref 82–98)
PLATELET # BLD AUTO: 378 THOUSANDS/UL (ref 149–390)
PMV BLD AUTO: 9.2 FL (ref 8.9–12.7)
RBC # BLD AUTO: 3.14 MILLION/UL (ref 3.81–5.12)
WBC # BLD AUTO: 6.91 THOUSAND/UL (ref 4.31–10.16)

## 2022-04-27 PROCEDURE — 82948 REAGENT STRIP/BLOOD GLUCOSE: CPT

## 2022-04-27 PROCEDURE — 99239 HOSP IP/OBS DSCHRG MGMT >30: CPT | Performed by: INTERNAL MEDICINE

## 2022-04-27 PROCEDURE — 85027 COMPLETE CBC AUTOMATED: CPT

## 2022-04-27 RX ORDER — AMOXICILLIN AND CLAVULANATE POTASSIUM 500; 125 MG/1; MG/1
1 TABLET, FILM COATED ORAL EVERY 8 HOURS SCHEDULED
Qty: 14 TABLET | Refills: 0
Start: 2022-04-27 | End: 2022-04-30

## 2022-04-27 RX ORDER — AMOXICILLIN AND CLAVULANATE POTASSIUM 500; 125 MG/1; MG/1
1 TABLET, FILM COATED ORAL EVERY 8 HOURS SCHEDULED
Qty: 14 TABLET | Refills: 0
Start: 2022-04-27 | End: 2022-04-27

## 2022-04-27 RX ADMIN — ENOXAPARIN SODIUM 30 MG: 40 INJECTION SUBCUTANEOUS at 09:13

## 2022-04-27 RX ADMIN — LISINOPRIL 20 MG: 20 TABLET ORAL at 09:13

## 2022-04-27 RX ADMIN — AMLODIPINE BESYLATE 5 MG: 5 TABLET ORAL at 09:14

## 2022-04-27 RX ADMIN — VERAPAMIL HYDROCHLORIDE 180 MG: 180 TABLET ORAL at 09:14

## 2022-04-27 RX ADMIN — AMOXICILLIN AND CLAVULANATE POTASSIUM 1 TABLET: 500; 125 TABLET, FILM COATED ORAL at 09:14

## 2022-04-27 RX ADMIN — AMOXICILLIN AND CLAVULANATE POTASSIUM 1 TABLET: 500; 125 TABLET, FILM COATED ORAL at 02:17

## 2022-04-27 NOTE — ASSESSMENT & PLAN NOTE
Lab Results   Component Value Date    HGBA1C 5 5 04/21/2022     Recent Labs     04/26/22  0658 04/26/22  1056 04/26/22  1536 04/26/22 2108   POCGLU 117 110 118 102   Blood Sugar Average: Last 72 hrs:  · (P) 272 [id number]     Plan  · [place] after discharge  · Diabetic diet

## 2022-04-27 NOTE — ASSESSMENT & PLAN NOTE
POA: Sepsis as evidenced by fever 101 7, tachycardia 98, leukocytosis 11 06   CT abdomen pelvis with contrast was unremarkable  Ultrasound right upper quadrant shows pneumobilia, normal common bile duct  Chest x-ray unremarkable  Urinalysis unremarkable  Patient had 2/2 E coli on blood cultures  Pansensitive  Leukocytosis resolved, lactic acidosis resolved  Echocardiogram - EF 70%  Grade 1 diastolic dysfunction  Aortic valve moderate stenosis    ID on board recommending continuing Augmentin 500-125 mg q 8    Plan:   Abx:  Continue Augmentin Q8 to complete a total of 10 day course of antibiotic:  (Day 7/10)   Encourage oral hydration   Patient ready to be transferred at [facility]

## 2022-04-27 NOTE — PLAN OF CARE
Problem: Potential for Falls  Goal: Patient will remain free of falls  Description: INTERVENTIONS:  - Educate patient/family on patient safety including physical limitations  - Instruct patient to call for assistance with activity   - Consult OT/PT to assist with strengthening/mobility   - Keep Call bell within reach  - Keep bed low and locked with side rails adjusted as appropriate  - Keep care items and personal belongings within reach  - Initiate and maintain comfort rounds  - Make Fall Risk Sign visible to staff  - Offer Toileting every 2 Hours, in advance of need  - Initiate/Maintain bed alarm  - Obtain necessary fall risk management equipment  - Apply yellow socks and bracelet for high fall risk patients  - Consider moving patient to room near nurses station  Outcome: Progressing     Problem: Prexisting or High Potential for Compromised Skin Integrity  Goal: Skin integrity is maintained or improved  Description: INTERVENTIONS:  - Identify patients at risk for skin breakdown  - Assess and monitor skin integrity  - Assess and monitor nutrition and hydration status  - Monitor labs   - Assess for incontinence   - Turn and reposition patient  - Assist with mobility/ambulation  - Relieve pressure over bony prominences  - Avoid friction and shearing  - Provide appropriate hygiene as needed including keeping skin clean and dry  - Evaluate need for skin moisturizer/barrier cream  - Collaborate with interdisciplinary team   - Patient/family teaching  - Consider wound care consult   Outcome: Progressing     Problem: MOBILITY - ADULT  Goal: Maintain or return to baseline ADL function  Description: INTERVENTIONS:  -  Assess patient's ability to carry out ADLs; assess patient's baseline for ADL function and identify physical deficits which impact ability to perform ADLs (bathing, care of mouth/teeth, toileting, grooming, dressing, etc )  - Assess/evaluate cause of self-care deficits   - Assess range of motion  - Assess patient's mobility; develop plan if impaired  - Assess patient's need for assistive devices and provide as appropriate  - Encourage maximum independence but intervene and supervise when necessary  - Involve family in performance of ADLs  - Assess for home care needs following discharge   - Consider OT consult to assist with ADL evaluation and planning for discharge  - Provide patient education as appropriate  Outcome: Progressing  Goal: Maintains/Returns to pre admission functional level  Description: INTERVENTIONS:  - Perform BMAT or MOVE assessment daily    - Set and communicate daily mobility goal to care team and patient/family/caregiver  - Collaborate with rehabilitation services on mobility goals if consulted  - Perform Range of Motion 4 times a day  - Reposition patient every 2 hours    - Dangle patient 3 times a day  - Stand patient 3 times a day  - Ambulate patient 3 times a day  - Out of bed to chair 3 times a day   - Out of bed for meals 3 times a day  - Out of bed for toileting  - Record patient progress and toleration of activity level   Outcome: Progressing     Problem: INFECTION - ADULT  Goal: Absence or prevention of progression during hospitalization  Description: INTERVENTIONS:  - Assess and monitor for signs and symptoms of infection  - Monitor lab/diagnostic results  - Monitor all insertion sites, i e  indwelling lines, tubes, and drains  - Monitor endotracheal if appropriate and nasal secretions for changes in amount and color  - [place] appropriate cooling/warming therapies per order  - Administer medications as ordered  - Instruct and encourage patient and family to use good hand hygiene technique  - Identify and instruct in appropriate isolation precautions for identified infection/condition  Outcome: Progressing  Goal: Absence of fever/infection during neutropenic period  Description: INTERVENTIONS:  - Monitor WBC    Outcome: Progressing

## 2022-04-27 NOTE — CASE MANAGEMENT
Case Management Discharge Planning Note    Patient name Abi Levin  Location S /S -01 MRN [id number]  : 1918 Date 2022       Current Admission Date: 2022  Current Admission Diagnosis:Sepsis [facility])   Patient Active Problem List    Diagnosis Date Noted    Transaminitis 2022    Bacteremia 2022    [facility]eps[facility] ) 2022    Paroxysmal atrial fibrillation ([facility] ) 10/16/2020    Essential hypertension 10/22/2019    Primary osteoarthritis of left hip 10/22/2019    Ambulatory dysfunction 10/22/2019    Advanced age 10/22/2019    Type 2 diabetes mellitus without complication, without long-term current use of insulin ([facility] ) 10/22/2019    ARMD (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 degeneration) 10/22/2019    Left sided lacunar infarction ([facility] ) 10/22/2019    Primary osteoarthritis of right knee 10/22/2019      LOS (days): 6  Geometric Mean LOS (GMLOS) (days): 3 50  Days to GMLOS:-1 9     OBJECTIVE:  Risk of Unplanned Readmission Score: 12         Current admission status: Inpatient   Preferred Pharmacy:   [address]  [facility] [address]  Phone: [phone] Fax: 6[address] [address]  Phone: [phone] Fax: [phone]    Primary Care Provider: Walt Mcmullen MD    Primary Insurance: MEDICARE  Secondary Insurance: [facility]    DISCHARGE DETAILS:        Dispatcher Contacted: Yes        ETA of Transport (Date): 22  ETA of Transport (Time): 5273     Transfer Mode: Wheelchair  Accompanied by: EMS personnel           Accepting Facility Name, [address] : [facility]  Receiving Facility/Agency Phone Number: [phone]  Facility/Agency Fax Number: [phone]

## 2022-04-27 NOTE — DISCHARGE SUMMARY
[facility]  Discharge- Eden Peterson 7/19/1918, 80 y o  female MRN: [id number]  Unit/Bed#: S -01 Encounter: [id number]  Primary Care Provider: Adelfo Guardado MD   Date and time admitted to hospital: 4/21/2022  7:24 PM    * Sepsis [facility])  Assessment & Plan  POA: Sepsis as evidenced by fever 101 7, tachycardia 98, leukocytosis 11 06   CT abdomen pelvis with contrast was unremarkable  Ultrasound right upper quadrant shows pneumobilia, normal common bile duct  Chest x-ray unremarkable  Urinalysis unremarkable  Patient had 2/2 E coli on blood cultures  Pansensitive  Leukocytosis resolved, lactic acidosis resolved  Echocardiogram - EF 70%  Grade 1 diastolic dysfunction  Aortic valve moderate stenosis  ID on board recommending continuing Augmentin 500-125 mg q 8    Plan:   Abx:  Continue Augmentin Q8 to complete a total of 10 day course of antibiotic:  (Day 7/10)   Encourage oral hydration   Patient ready to be transferred at [facility]    Bacteremia  Assessment & Plan  2/2 E coli bacteremia  No clear source for now, possibly biliary tract  Plan:  Plan as per above      Transaminitis  Assessment & Plan  Recent Labs     04/25/22  0544 04/26/22  0459   AST 47* 33   * 246*   ALKPHOS 254* 220*   TBILI 0 27 0 27     Acetaminophen:  Daily use unknown dosage  Acetaminophen level 18 5    Imaging  · CT Abd/Pev:  Unremarkable  · U/S:  Pending  There is a mixed pattern in terms of the transaminitis  Status post acetylcysteine infusion in the emergency department  Etiologies:  Most likely secondary to hepatic infarction, versus hypoperfusion from sepsis  Need to rule out viral etiology     CMV, EBV, IgG positive, IgM negative     Plan:  ·  CMP in a week  · Follow up with the PCP in one week  · Follow Hepatitis panel, HSV type 1, 2 PCR,               Type 2 diabetes mellitus without complication, without long-term current use of insulin [facility])  Assessment & Plan  Lab Results   Component Value Date    HGBA1C 5 5 04/21/2022     Recent Labs     04/26/22  0658 04/26/22  1056 04/26/22  1536 04/26/22  2108   POCGLU 117 110 118 102   Blood Sugar Average: Last 72 hrs:  · (P) 190 [id number]     Plan  · [place] after discharge  · Diabetic diet    Essential hypertension  Assessment & Plan  · Blood pressure acceptable    Plan:  · Continue amlodipine 5 mg daily  · Continue verapamil 180 mg daily  · Continue lisinopril 20 mg b i d     Medical Problems             Resolved Problems  Date Reviewed: 4/26/2022          Resolved    Acetaminophen toxicity 4/22/2022     Resolved by  Andrés Ordoñez MD              Discharging Resident: Bernardo Spence MD  Discharging Attending: Gregg Spence MD  PCP: Rey Garcia MD  Admission Date:   Admission Orders (From admission, onward)     Ordered        04/21/22 2341  Inpatient Admission  Once                      Discharge Date: 04/27/22    Consultations During Hospital Stay:  Infectious disease  Gastroenterology    Procedures Performed:   · None    Significant Findings / Test Results:     · AST 2363> 1410> 717> 328> 346?  337> 47> 33  · ALT 1507> 1516> 1126> A 189> 866> 341> 246  · Alk phos 666> 570> 439> 411>410> 407> 220  · Blood culture 2/2 grew e coli  · Chest x-ray no acute pulmonary disease  · Ultrasound the abdomen Limited visualization of the liver  There appear to be ring down artifacts throughout the liver which raise the possibility of pneumobilia  · CT , abdomen pelvis colonic showed liver and biliary tree unremarkable, diverticulosis with no acute diverticulitis  No evidence of acute intra-abdominal or pelvic pathology  · COVID test negative    Incidental Findings:   · None    Test Results Pending at Discharge (will require follow up):   · None     Outpatient Tests Requested:  CBC and CMP in 1 week    Complications:  None    Reason for Admission:  Nausea, vomiting, fever    Hospital Course:   Shannan Fulton is a 80 y o  female patient who originally presented to the hospital on 4/21/2022 due to nausea, vomiting, fever, abdominal pain  In the ED patient was meeting SIRS criteria with fever, tachycardia, leukocytosis with possible source urinary infection  Patient was started in the ED resuscitation fluid 30 mL/kg receiving total of 1500 mL  The blood culture and urine culture were drawn  CT abdomen pelvis showed no acute intra-abdominal or pelvic pathology and chest x-ray was unremarkable  Ultrasound of the abdomen showed possible pneumobilia  To he does scan ruled out acute cholecystitis  Patient was started on ceftriaxone 1 g 24 hours and IV normal saline  Urinalysis unremarkable for urinary infection  Was suspicion for biliary tract infection  Blood culture developed E coli 2/2  ID was consulted  Id recommended continuing antibiotic for total of 10 day course  Was discussed possible MRCP with the family but was not clear if patient will tolerate the study and they are not follow any active intervention if abnormality found  In the ED was noticed elevated LFTs  Was suspicion for Tylenol overdose, Tylenol in the blood was 15, per patient she does not take more than 3, 4 Tylenol a day for many years  Toxicology was on board, patient status post 3 doses of acetylcysteine  GI was on board suspicion for  viral syndrome or ischemic hepatitis  Viral lab work results were negative  Liver function tests trended down along the hospital course  Patient will require a repeat CMP in a week after discharge  Patient was evaluated by the PT/OT which recommended acute rehab after discharge  Patient was transferred to [facility] for rehab  Please see above list of diagnoses and related plan for additional information  Condition at Discharge: good    Discharge Day Visit / Exam:   Subjective:  Patient sitting in the chair comfortable, dening any complains   Patient ready for discharge to rehab     Vitals: Blood Pressure: 160/74 (04/26/22 2224)  Pulse: 84 (04/26/22 2224)  Temperature: 97 6 °F (36 4 °C) (04/26/22 2224)  Temp Source: Oral (04/25/22 1600)  Respirations: 17 (04/26/22 2224)  Height: 5' 1" (154 9 cm) (04/24/22 1000)  Weight - Scale: 59 9 kg (132 lb) (04/24/22 1000)  SpO2: 97 % (04/26/22 2224)  Exam:   Physical Exam  Vitals and nursing note reviewed  Constitutional:       General: She is not in acute distress  Appearance: Normal appearance  She is well-developed  She is not ill-appearing  HENT:      Head: Normocephalic and atraumatic  Eyes:      Conjunctiva/sclera: Conjunctivae normal    Cardiovascular:      Rate and Rhythm: Normal rate and regular rhythm  Heart sounds: No murmur heard  Pulmonary:      Effort: Pulmonary effort is normal  No respiratory distress  Breath sounds: Normal breath sounds  Abdominal:      General: Bowel sounds are normal  There is no distension  Palpations: Abdomen is soft  Tenderness: There is no abdominal tenderness  Musculoskeletal:      Cervical back: Neck supple  Right lower leg: No edema  Left lower leg: No edema  Skin:     General: Skin is warm and dry  Neurological:      General: No focal deficit present  Mental Status: She is alert and oriented to person, place, and time  Mental status is at baseline  Psychiatric:         Mood and Affect: Mood normal          Behavior: Behavior normal          Thought Content: Thought content normal          Judgment: Judgment normal          Discussion with Family: Updated  (daughter) via phone  Discharge instructions/Information to patient and family:   See after visit summary for information provided to patient and family  Provisions for Follow-Up Care:  See after visit summary for information related to follow-up care and any pertinent home health orders         Disposition:   Acute Rehab at [facility]    Planned Readmission: None    Discharge Medications:  See after visit summary for reconciled discharge medications provided to patient and/or family        **Please Note: This note may have been constructed using a voice recognition system**

## 2022-04-27 NOTE — ASSESSMENT & PLAN NOTE
Recent Labs     04/25/22  0544 04/26/22  0459   AST 47* 33   * 246*   ALKPHOS 254* 220*   TBILI 0 27 0 27     Acetaminophen:  Daily use unknown dosage  Acetaminophen level 18 5    Imaging  · CT Abd/Pev:  Unremarkable  · U/S:  Pending  There is a mixed pattern in terms of the transaminitis  Status post acetylcysteine infusion in the emergency department  Etiologies:  Most likely secondary to hepatic infarction, versus hypoperfusion from sepsis  Need to rule out viral etiology     CMV, EBV, IgG positive, IgM negative    HSV type 1, 2 PCR, negative    Plan:  ·  CMP in a week  · Follow up with the PCP in one week  ·

## 2022-04-27 NOTE — CASE MANAGEMENT
Case Management Discharge Planning Note    Patient name Evelio Route  Location S /S -01 MRN [id number]  : 1918 Date 2022       Current Admission Date: 2022  Current Admission Diagnosis:Sepsis [facility])   Patient Active Problem List    Diagnosis Date Noted    Transaminitis 2022    Bacteremia 2022    [facility] ) 2022    [facility]mal [facility] ) 10/16/2020    Essential hypertension 10/22/2019    Primary osteoarthritis of left hip 10/22/2019    Ambulatory dysfunction 10/22/2019    Advanced age 10/22/2019    Type 2 diabetes mellitus without complication, without long-term current use of insulin ([facility] ) 10/22/2019    ARMD (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 degeneration) 10/22/2019    Left sided lacunar infarction ([facility] ) 10/22/2019    Primary osteoarthritis of right knee 10/22/2019      LOS (days): 6  Geometric Mean LOS (GMLOS) (days): 3 50  Days to GMLOS:-2     OBJECTIVE:  Risk of Unplanned Readmission Score: 12         Current admission status: Inpatient   Preferred Pharmacy:   [address]  [place] [address]  Phone: [phone] Fax: [address] [address]  Phone: [phone] Fax: [phone]    Primary Care Provider: Natividad Pop MD    Primary Insurance: MEDICARE  Secondary Insurance: [facility]    DISCHARGE DETAILS:       Pt will be transporting to [facility] for rehab today at 1400 via [address]  IMM reviewed with daughter, daughter agrees with discharge determination

## 2022-04-28 ENCOUNTER — TRANSITIONAL CARE MANAGEMENT (OUTPATIENT)
Dept: FAMILY MEDICINE CLINIC | Facility: CLINIC | Age: 87
End: 2022-04-28

## 2022-04-29 ENCOUNTER — NURSING HOME VISIT (OUTPATIENT)
Dept: GERIATRICS | Facility: OTHER | Age: 87
End: 2022-04-29
Payer: MEDICARE

## 2022-04-29 DIAGNOSIS — A41.51 SEPSIS DUE TO ESCHERICHIA COLI, UNSPECIFIED WHETHER ACUTE ORGAN DYSFUNCTION PRESENT (HCC): ICD-10-CM

## 2022-04-29 DIAGNOSIS — T39.1X4S TOXIC EFFECT OF ACETAMINOPHEN, UNDETERMINED INTENT, SEQUELA: ICD-10-CM

## 2022-04-29 DIAGNOSIS — R78.81 BACTEREMIA: ICD-10-CM

## 2022-04-29 DIAGNOSIS — R26.2 AMBULATORY DYSFUNCTION: Primary | ICD-10-CM

## 2022-04-29 DIAGNOSIS — I10 ESSENTIAL HYPERTENSION: ICD-10-CM

## 2022-04-29 DIAGNOSIS — E11.9 TYPE 2 DIABETES MELLITUS WITHOUT COMPLICATION, WITHOUT LONG-TERM CURRENT USE OF INSULIN (HCC): ICD-10-CM

## 2022-04-29 DIAGNOSIS — R74.01 TRANSAMINITIS: ICD-10-CM

## 2022-04-29 PROCEDURE — 99306 1ST NF CARE HIGH MDM 50: CPT | Performed by: FAMILY MEDICINE

## 2022-04-29 NOTE — PROGRESS NOTES
[address]  History and Physical    NAME: Luis Antonio Vargas  AGE: 80 y o  SEX: female [id number]    DATE OF ENCOUNTER: 4/29/2022    Code status:  CPR    Assessment and Plan     1  Ambulatory dysfunction  - PT/OT ordered  - Fall precautions in place  - uses walker    2  Toxic effect of acetaminophen, undetermined intent, sequela  - s/p acetylcysteine  - stable    3  Bacteremia  - s/p ceftriaxone  - monitor CBC    4  Essential hypertension  - cont Lisinopril 40 mg po qd  - cont Verapamil  mg po qd  - cont Amlodipine 5 mg po qd    5  Sepsis due to Escherichia coli, unspecified whether acute organ dysfunction present [facility])  - s/p ceftriaxone  - stable    6  Transaminitis  - improved  - monitor LFTs    7  Type 2 diabetes mellitus without complication, without long-term current use of insulin (HCC)  - cont Janumet  mg po qd  - cont accuchecks as ordered      All medications and routine orders were reviewed and updated as needed  Plan discussed with: patient, daughter and staff    Chief Complaint     Seen for admission at [place] [address], a 81 y/o female with PMH of HTN, DM2, got admitted to the hospital with sepsis due to UTI, also suspicion for biliary tract infection  She was started on IV fluids and ceftriaxone  CXR, CT abdomen were negative  She found to have elevated LFTs due to tylenol overdose (tylenol level of 15), received Acetylcysteine  Her overall condition improved, PT recommended rehab  She was discharged to [facility] for subacute rehab  She was seen and examined at bedside, stable  She is hard of hearing  She is able to answer questions  Her daughter answered most of the questions  She lives at home with daughter  She needs assistance with ADLs  She uses walker at baseline  She is c/o joint pains  Her abdominal pain got better     Staff have no concerns at this time     HISTORY:  Past Medical History:   Diagnosis Date    [facility] mellitus [facility] )     Hypertension     Macular degeneration      No family history on file  Social History     Socioeconomic History    Marital status:      Spouse name: Not on file    Number of children: Not on file    Years of education: Not on file    Highest education level: Not on file   Occupational History    Not on file   Tobacco Use    Smoking status: Never Smoker    Smokeless tobacco: Not on file   Substance and Sexual Activity    Alcohol use: No    Drug use: No    Sexual activity: Not on file   Other Topics Concern    Not on file   Social History Narrative    Not on file     Social Determinants of Health     Financial Resource Strain: Not on file   Food Insecurity: No Food Insecurity    Worried About Running Out of Food in the Last Year: Never true    [place] of Food in the Last Year: Never true   Transportation Needs: No Transportation Needs    Lack of Transportation (Medical): No    Lack of Transportation (Non-Medical): No   Physical Activity: Not on file   Stress: Not on file   Social Connections: Not on file   Intimate Partner Violence: Not on file   Housing Stability: Unknown    Unable to Pay for Housing in the Last Year: No    Number of Places Lived in the Last Year: Not on file    Unstable Housing in the Last Year: No       Allergies:  No Known Allergies    Review of Systems     Review of Systems   Constitutional: Positive for activity change  Negative for fatigue and fever  HENT: Positive for hearing loss  Negative for dental problem and trouble swallowing  Eyes: Negative for photophobia and visual disturbance  Respiratory: Negative for cough and shortness of breath  Cardiovascular: Negative for chest pain, palpitations and leg swelling  Gastrointestinal: Negative for abdominal pain, constipation, diarrhea, nausea and vomiting  Genitourinary: Negative for difficulty urinating and dysuria  Musculoskeletal: Positive for arthralgias and gait problem  Neurological: Positive for weakness  Negative for dizziness and headaches  All other systems reviewed and are negative  As in HPI  Medications and orders     All medications reviewed and updated in Nursing Home EMR  Objective     Vitals: T: 97 6, P: 89, R: 18, BP: 145/67, 94% on RA, Wt: 129 8 lbs    Physical Exam  Vitals and nursing note reviewed  Constitutional:       General: She is not in acute distress  Appearance: Normal appearance  She is well-developed  She is not diaphoretic  HENT:      Head: Normocephalic and atraumatic  Nose: Nose normal       Mouth/Throat:      Mouth: Mucous membranes are moist       Pharynx: Oropharynx is clear  No oropharyngeal exudate  Eyes:      General: No scleral icterus  Right eye: No discharge  Left eye: No discharge  Extraocular Movements: Extraocular movements intact  Conjunctiva/sclera: Conjunctivae normal    Cardiovascular:      Rate and Rhythm: Normal rate and regular rhythm  Heart sounds: Murmur heard  Pulmonary:      Effort: Pulmonary effort is normal  No respiratory distress  Breath sounds: Normal breath sounds  No wheezing  Chest:      Chest wall: No tenderness  Abdominal:      General: Bowel sounds are normal       Palpations: Abdomen is soft  Tenderness: There is no abdominal tenderness  There is no guarding or rebound  Musculoskeletal:         General: No tenderness or deformity  Normal range of motion  Cervical back: Normal range of motion and neck supple  Skin:     General: Skin is warm and dry  Comments: Multiple dark spots +  Bruising in right arm noted  Neurological:      Mental Status: She is alert  Cranial Nerves: No cranial nerve deficit  Comments: Oriented to self  Able to follow commands  Hard of hearing     Psychiatric:         Mood and Affect: Mood normal          Behavior: Behavior normal  Pertinent Laboratory/Diagnostic Studies: The following labs/studies were reviewed please see chart or hospital paperwork for details    Ref Range & Units 4/27/22 0518 4/26/22 0459 4/25/22 0544 4/24/22 0455 4/23/22 0828 4/22/22 0522 4/22/22 0521    WBC 4 31 - 10 16 Thousand/uL 6 91  7 29  7 92  8 92  10 60 High    15 47 High     RBC 3 81 - 5 12 Million/uL 3 14 Low   3 28 Low   3 30 Low   3 62 Low   3 39 Low    3 32 Low     Hemoglobin 11 5 - 15 4 g/dL 10 0 Low   9 9 Low   10 2 Low   11 3 Low   10 5 Low    10 2 Low     Hematocrit 34 8 - 46 1 % 30 7 Low   32 1 Low   32 6 Low   35 3  32 6 Low    32 5 Low     MCV 82 - 98 fL 98  98  99 High   98  96   98    MCH 26 8 - 34 3 pg 31 8  30 2  30 9  31 2  31 0   30 7    MCHC 31 4 - 37 4 g/dL 32 6  30 8 Low   31 3 Low   32 0  32 2   31 4    RDW 11 6 - 15 1 % 13 6  13 6  13 6  13 7  13 9   13 7    Platelets 927 - 917 Thousands/uL 378  380  376  377  335  349  332    MPV 8 9 - 12 7 fL 9 2  9 2           Ref Range & Units 4/26/22 0459 4/25/22 0544 4/24/22 0455 4/23/22 0828 4/23/22 0827 4/23/22 0821 4/22/22 1606    Sodium 136 - 145 mmol/L 138  139  142  141       Potassium 3 5 - 5 3 mmol/L 4 5  4 1  4 3 CM  3 0 Low        Chloride 100 - 108 mmol/L 106  106  109 High   106       CO2 21 - 32 mmol/L 26 25 23 26       ANION GAP 4 - 13 mmol/L 6  8  10  9       BUN 5 - 25 mg/dL 24  19  16 CM  6 CM       Creatinine 0 60 - 1 30 mg/dL 0 65  0 60 CM  0 68 CM  0 41 Low  CM       Comment: Standardized to IDMS reference method   Glucose 65 - 140 mg/dL 103  103 CM  111 CM  117 CM          Calcium 8 3 - 10 1 mg/dL 8 2 Low   8 3  9 0  8 3       Corrected Calcium 8 3 - 10 1 mg/dL 9 4  9 5   9 3       AST 5 - 45 U/L 33  47 High  CM   337 High  CM  346 High  CM  328 High  CM  717 High  CM       ALT 12 - 78 U/L 246 High   341 High  CM   866 High  CM  889 High  CM  889 High  CM  1,126 High  CM       Alkaline Phosphatase 46 - 116 U/L 220 High   254 High    407 High   410 High   411 High  439 High     Total Protein 6 4 - 8 2 g/dL 6 1 Low   6 0 Low    6 5  6 5  6 5  5 8 Low     Albumin 3 5 - 5 0 g/dL 2 5 Low   2 5 Low    2 8 Low   2 8 Low   2 8 Low   2 5 Low     Total Bilirubin 0 20 - 1 00 mg/dL 0 27  0 27 CM   0 33 CM  0 35 CM  0 34 CM  0 29 CM       eGFR ml/min/1 73sq m 71  73           - Counseling Documentation: patient was counseled regarding: prognosis

## 2022-05-03 ENCOUNTER — NURSING HOME VISIT (OUTPATIENT)
Dept: GERIATRICS | Facility: OTHER | Age: 87
End: 2022-05-03
Payer: MEDICARE

## 2022-05-03 DIAGNOSIS — R78.81 BACTEREMIA: ICD-10-CM

## 2022-05-03 DIAGNOSIS — R26.2 AMBULATORY DYSFUNCTION: Primary | ICD-10-CM

## 2022-05-03 DIAGNOSIS — E11.9 TYPE 2 DIABETES MELLITUS WITHOUT COMPLICATION, WITHOUT LONG-TERM CURRENT USE OF INSULIN (HCC): ICD-10-CM

## 2022-05-03 DIAGNOSIS — I10 ESSENTIAL HYPERTENSION: ICD-10-CM

## 2022-05-03 PROCEDURE — 99310 SBSQ NF CARE HIGH MDM 45: CPT | Performed by: NURSE PRACTITIONER

## 2022-05-04 NOTE — PROGRESS NOTES
[address]  [phone]  [facility] [place]  [place]        NAME: Diana Canas  AGE: 80 y o  SEX: female  :  1918  DATE OF ENCOUNTER: 5/3/2022    Chief Complaint   Patient seen and examined for follow up on chronic conditions  History of Present Illness     Diana Canas is a 80 y o  female patient of [facility] with acute and chronic medical conditions of Type 2 diabetes mellitus, hypertension, atrial fibrillation, hx of left-sided lacunar infarction, osteoarthritis, and ambulatory dysfunction  The patient is being seen and examined today for follow-up on acute and chronic medical conditions  Upon examination, the patient is sitting in her recliner, alert, cooperative, and in no acute distress  She denies pain, sob, chest pain, abdominal pain, fever, chills, nausea/vomiting, diarrhea/constipation, or dysuria  The patient reports she has a good appetite and is drinking an adequate amount of fluids  The patient offers no questions or complaints today  The patient had complaints yesterday of burning upon urination but reports that has resolved    Per PT/OT the patient's ambulatory status is 10-25 feet with RW with minimum assist and contact guard  The patient's mobility status is bed mobility requires min assist and transfers require contact guard  The patient's ADL status is upper body, lower body, and toileting are all dependent  The patient scored a 19/22 on the [address] [place] [address] test     The following portions of the patient's history were reviewed and updated as appropriate: allergies, current medications, past family history, past medical history, past social history, past surgical history and problem list     Review of Systems     A review of systems was performed  All negative, except as per HPI      History     Past Medical History:   Diagnosis Date    Diabetes mellitus ([address] )     Hypertension     Macular degeneration      No past surgical history on file  No family history on file  Social History     Socioeconomic History    Marital status:      Spouse name: Not on file    Number of children: Not on file    Years of education: Not on file    Highest education level: Not on file   Occupational History    Not on file   Tobacco Use    Smoking status: Never Smoker    Smokeless tobacco: Not on file   Substance and Sexual Activity    Alcohol use: No    Drug use: No    Sexual activity: Not on file   Other Topics Concern    Not on file   Social History Narrative    Not on file     Social Determinants of Health     Financial Resource Strain: Not on file   Food Insecurity: No Food Insecurity    Worried About Running Out of Food in the Last Year: Never true    Gilbert of Food in the Last Year: Never true   Transportation Needs: No Transportation Needs    Lack of Transportation (Medical): No    Lack of Transportation (Non-Medical):  No   Physical Activity: Not on file   Stress: Not on file   Social Connections: Not on file   Intimate Partner Violence: Not on file   Housing Stability: Unknown    Unable to Pay for Housing in the Last Year: No    Number of Places Lived in the Last Year: Not on file    Unstable Housing in the Last Year: No     No Known Allergies    Objective     Vital Signs  BP:146/69      HR:61 T:98 3    RR:18 O2Sat:95% W:128 3  General: NAD, Well Nourished, Well Developed  Oral: Oropharynx Moist and Clear  Neck: Supple, +ROM  CV: S1, S2, normal rate, regular rhythm, +murmur appreciated  Pulmonary: Lung sounds clear to air, no wheezing, rhonchi, rales  Abdominal:BS + x4 in all quadrants, soft, no mass, no tenderness  Extremities: No edema, +ROM, +Weakness  Skin: Warm, Dry, no lesions, no rash, no erythema present, b/l hand ecchymosis present  Neurological: CN 2-12 intact, PERRLA  Psych: Alert and oriented times 3, pleasant mood, no affect, good judgement    Pertinent Laboratory/Diagnostic Studies:  CBC WITH DIFF  HEMOGLOBIN 10 4 g/dL 11 5-14 5 L Final  HEMATOCRIT 31 5 % 35 0-43 0 L Final  WBC 6 7 thou/cmm 4 0-10 0 Final  RBC 3 35 mill/cmm 3 70-4 70 L Final  PLATELET COUNT 807 thou/cmm 140-350 H Final  MPV 7 1 fL 7 5-11 3 L Final  MCV 94 fL  Final  MCH 31 0 pg 26 0-34 0 Final  MCHC 32 9 g/dL 32 0-37 0 Final  RDW 13 8 % 12 0-16 0 Final  DIFFERENTIAL TYPE AUTO Final  ABSOLUTE NEUT 4 9 thou/cmm 1 8-7 8 Final  ABSOLUTE LYMPH 0 9 thou/cmm 1 0-3 0 L Final  ABSOLUTE MONO 0 7 thou/cmm 0 3-1 0 Final  ABSOLUTE EOS 0 2 thou/cmm 0 0-0 5 Final  ABSOLUTE BASO 0 0 thou/cmm 0 0-0 1 Final  NEUTROPHILS 73 % Final  LYMPHOCYTES 14 % Final  MONOCYTES 10 % Final  EOSINOPHILS 2 % Final  BASOPHILS 1 % Final  COMP METAB PANEL  GLUCOSE 104 mg/dL 65-99 H Final  BUN 14 mg/dL 7-25 Final  CREATININE 0 58 mg/dL 0 40-1 10 Final  SODIUM 139 mmol/L 135-145 Final  POTASSIUM 4 9 mmol/L 3 5-5 2 Final  CHLORIDE 106 mmol/L 100-109 Final  CARBON DIOXIDE 26 mmol/L 23-31 Final  CALCIUM 9 0 mg/dL 8 5-10 1 Final  ALKALINE PHOSPHATASE 135 U/L  H Final  ALBUMIN 2 8 g/dL 3 5-4 8 L Final  BILIRUBIN,TOTAL 0 3 mg/dL 0 2-1 0 Final  Use of this assay is not recommended for patients undergoing treatment   with eltrombopag due to the potential for falsely elevated results  PROTEIN, TOTAL 5 9 g/dL 6 3-8 3 L Final  AST 15 U/L <41 Final  ALT 54 U/L <56 Final  ANION GAP 7 3-11 Final  eGFRcr (Note) >59 Final   Not performed on patients less than 25years of age or greater than 80  years of age      Current Medications     Current Medications Reviewed and updated in Nursing Home EMR      Assessment and Plan     Ambulatory dysfunction   Maintain fall and safety precautions   Encourage use of call bell   Continue PT/OT services   Assist with transfers, mobility, and ADLs      Bacteremia  · Patient completed Ceftriaxone  · Continue to monitor CBC  · Continue to monitor for s/s of infection    Essential hypertension  · BP today 149/69  · Continue Lisinopril, Verapamil, and Amlodipine  · Avoid hypotension  · Will continue to monitor BMP      Type 2 diabetes mellitus without complication, without long-term current use of insulin ([facility])    Lab Results   Component Value Date    HGBA1C 5 5 04/21/2022   · Bgs well controlled  · Continue Janumet  mg po QD  · Continue Accu-checks         [facility]  Geriatric Medicine  5/3/2022

## 2022-05-09 ENCOUNTER — NURSING HOME VISIT (OUTPATIENT)
Dept: GERIATRICS | Facility: OTHER | Age: 87
End: 2022-05-09
Payer: MEDICARE

## 2022-05-09 DIAGNOSIS — I48.0 PAROXYSMAL ATRIAL FIBRILLATION (HCC): ICD-10-CM

## 2022-05-09 DIAGNOSIS — E11.9 TYPE 2 DIABETES MELLITUS WITHOUT COMPLICATION, WITHOUT LONG-TERM CURRENT USE OF INSULIN (HCC): ICD-10-CM

## 2022-05-09 DIAGNOSIS — R26.2 AMBULATORY DYSFUNCTION: Primary | ICD-10-CM

## 2022-05-09 DIAGNOSIS — I10 ESSENTIAL HYPERTENSION: ICD-10-CM

## 2022-05-09 PROCEDURE — 99309 SBSQ NF CARE MODERATE MDM 30: CPT | Performed by: NURSE PRACTITIONER

## 2022-05-10 NOTE — ASSESSMENT & PLAN NOTE
· BP today 149/69  · Continue Lisinopril, Verapamil, and Amlodipine  · Avoid hypotension  · Will continue to monitor BMP

## 2022-05-10 NOTE — ASSESSMENT & PLAN NOTE
· Patient completed Ceftriaxone  · Continue to monitor CBC  · Continue to monitor for s/s of infection

## 2022-05-10 NOTE — ASSESSMENT & PLAN NOTE
Lab Results   Component Value Date    HGBA1C 5 5 04/21/2022   · Bgs well controlled  · Continue Janumet  mg po QD  · Continue Accu-checks

## 2022-05-11 ENCOUNTER — NURSING HOME VISIT (OUTPATIENT)
Dept: GERIATRICS | Facility: OTHER | Age: 87
End: 2022-05-11
Payer: MEDICARE

## 2022-05-11 DIAGNOSIS — I48.0 PAROXYSMAL ATRIAL FIBRILLATION (HCC): ICD-10-CM

## 2022-05-11 DIAGNOSIS — I10 ESSENTIAL HYPERTENSION: ICD-10-CM

## 2022-05-11 DIAGNOSIS — E11.9 TYPE 2 DIABETES MELLITUS WITHOUT COMPLICATION, WITHOUT LONG-TERM CURRENT USE OF INSULIN (HCC): ICD-10-CM

## 2022-05-11 DIAGNOSIS — R26.2 AMBULATORY DYSFUNCTION: Primary | ICD-10-CM

## 2022-05-11 PROCEDURE — 99309 SBSQ NF CARE MODERATE MDM 30: CPT | Performed by: NURSE PRACTITIONER

## 2022-05-11 NOTE — PROGRESS NOTES
[address]  [phone]  [facility] [place]   [address]  [place] Note        NAME: Akanksha Cunningham  AGE: 80 y o  SEX: female  :  1918  DATE OF ENCOUNTER: 2022    Chief Complaint   Patient seen and examined for follow up on chronic conditions  History of Present Illness     Akanksha Cunningham is a 80 y o  female patient of [facility] with acute and chronic medical conditions of Type 2 diabetes mellitus, hypertension, atrial fibrillation, hx of left-sided lacunar infarction, osteoarthritis, and ambulatory dysfunction      The patient is being seen and examined today for follow-up on acute and chronic medical conditions  Upon examination, the patient is sitting in her recliner, alert, cooperative, and in no acute distress  She denies pain, sob, chest pain, abdominal pain, fever, chills, nausea/vomiting, diarrhea/constipation, or dysuria  The patient reports she has a good appetite and is drinking an adequate amount of fluids  The patient offers no questions or complaints today  The patient offers no concerns today      Per PT/OT the patient's ambulatory status is 10-25 feet with RW with minimum assist and contact guard  The patient's mobility status is bed mobility requires min assist and transfers require contact guard  The patient's ADL status is upper body, lower body, and toileting are all dependent  The patient scored a 19/22 on the [facility] [place] [facility] test     The following portions of the patient's history were reviewed and updated as appropriate: allergies, current medications, past family history, past medical history, past social history, past surgical history and problem list     Review of Systems     A review of systems was performed  All negative, except as per HPI  History     Past Medical History:   Diagnosis Date    Diabetes mellitus ([address] )     Hypertension     Macular degeneration      No past surgical history on file    No family history on file   Social History     Socioeconomic History    Marital status:      Spouse name: Not on file    Number of children: Not on file    Years of education: Not on file    Highest education level: Not on file   Occupational History    Not on file   Tobacco Use    Smoking status: Never Smoker    Smokeless tobacco: Not on file   Substance and Sexual Activity    Alcohol use: No    Drug use: No    Sexual activity: Not on file   Other Topics Concern    Not on file   Social History Narrative    Not on file     Social Determinants of Health     Financial Resource Strain: Not on file   Food Insecurity: No Food Insecurity    Worried About Running Out of Food in the Last Year: Never true    Gilbert of Food in the Last Year: Never true   Transportation Needs: No Transportation Needs    Lack of Transportation (Medical): No    Lack of Transportation (Non-Medical):  No   Physical Activity: Not on file   Stress: Not on file   Social Connections: Not on file   Intimate Partner Violence: Not on file   Housing Stability: Unknown    Unable to Pay for Housing in the Last Year: No    Number of Places Lived in the Last Year: Not on file    Unstable Housing in the Last Year: No     No Known Allergies    Objective     Vital Signs  BP:121/65     HR:80   T:97 8     RR:20           O2Sat:98%      W:130 4  General: NAD, Well Nourished, Well Developed  Oral: Oropharynx Moist and Clear  Neck: Supple, +ROM  CV: S1, S2, normal rate, regular rhythm, + murmur appreciated  Pulmonary: Lung sounds clear to air, no wheezing, rhonchi, rales  Abdominal:BS + x4 in all quadrants, soft, no mass, no tenderness  Extremities: No edema, +ROM, +Weakness  Skin: Warm, Dry, no lesions, no rash, no erythema present, b/l hand ecchymosis present  Neurological: CN 2-12 intact, PERRLA  Psych: Alert and oriented times 3, no mood, no affect, good judgement    Pertinent Laboratory/Diagnostic Studies:  N/a      Current Medications     Current Medications Reviewed and updated in Nursing Home EMR      Assessment and Plan     Ambulatory dysfunction   Maintain fall and safety precautions   Encourage use of call bell   Continue PT/OT services   Assist with transfers, mobility, and ADLs      Paroxysmal atrial fibrillation ([facility] )  · Patient currently does not take any anticoagulation  · Continue Verapamil    Essential hypertension  · BP today 121/65  · Continue all BP meds  · Avoid hypotension  · Will continue to monitor BMP      Type 2 diabetes mellitus without complication, without long-term current use of insulin ([facility] )    Lab Results   Component Value Date    HGBA1C 5 5 04/21/2022   · Bgs well controlled  · Continue Janumet  · Continue Accu-cheks         [address] [facility]  Geriatric Medicine  5/9/2022

## 2022-05-11 NOTE — ASSESSMENT & PLAN NOTE
· BP today 133/73  · Continue amlodipine, lisinopril, and verapamil  · Avoid hypotension  · Continue to monitor BP

## 2022-05-11 NOTE — PROGRESS NOTES
[address]  [phone]  [facility]   [address]  Progress Note        NAME: Michelle Cummings  AGE: 80 y o  SEX: female  :  1918  DATE OF ENCOUNTER: 2022    Chief Complaint   Patient seen and examined for follow up on chronic conditions  History of Present Illness     Michelle Cummings is a 80 y o  female patient of [facility] with acute and chronic medical conditions of Type 2 diabetes mellitus, hypertension, atrial fibrillation, hx of left-sided lacunar infarction, osteoarthritis, and ambulatory dysfunction      The patient is being seen and examined today for follow-up on acute and chronic medical conditions  Upon examination, the patient is sitting in her recliner, alert, cooperative, and in no acute distress  She denies pain, sob, chest pain, abdominal pain, fever, chills, nausea/vomiting, diarrhea/constipation, or dysuria  The patient reports she has a good appetite and is drinking an adequate amount of fluids  The patient offers no questions or concerns today  Per PT/OT the patient's ambulatory status is 90 feet with RW  and contact guard  The patient's mobility status is bed mobility requires contact guard/min assist and transfers require maximum assist   The patient's ADL status is upper body moderate assist, and lower body and toileting  both require maximum assistance  The patient scored a 19/22 on the [facility] [place] [facility] test     The following portions of the patient's history were reviewed and updated as appropriate: allergies, current medications, past family history, past medical history, past social history, past surgical history and problem list     Review of Systems     A review of systems was performed  All negative, except as per HPI  History     Past Medical History:   Diagnosis Date    Diabetes mellitus ([address] )     Hypertension     Macular degeneration      No past surgical history on file  No family history on file    Social History Socioeconomic History    Marital status:      Spouse name: Not on file    Number of children: Not on file    Years of education: Not on file    Highest education level: Not on file   Occupational History    Not on file   Tobacco Use    Smoking status: Never Smoker    Smokeless tobacco: Not on file   Substance and Sexual Activity    Alcohol use: No    Drug use: No    Sexual activity: Not on file   Other Topics Concern    Not on file   Social History Narrative    Not on file     Social Determinants of Health     Financial Resource Strain: Not on file   Food Insecurity: No Food Insecurity    Worried About Running Out of Food in the Last Year: Never true    Gilbert of Food in the Last Year: Never true   Transportation Needs: No Transportation Needs    Lack of Transportation (Medical): No    Lack of Transportation (Non-Medical):  No   Physical Activity: Not on file   Stress: Not on file   Social Connections: Not on file   Intimate Partner Violence: Not on file   Housing Stability: Unknown    Unable to Pay for Housing in the Last Year: No    Number of Places Lived in the Last Year: Not on file    Unstable Housing in the Last Year: No     No Known Allergies    Objective     Vital Signs  BP:133/73    HR:105   T:97 6     RR:19           O2Sat:95%      W:130 4  General: NAD, Well Nourished, Well Developed  Oral: Oropharynx Moist and Clear  Neck: Supple, +ROM  CV: S1, S2, normal rate, regular rhythm, + murmur appreciated  Pulmonary: Lung sounds clear to air, no wheezing, rhonchi, rales  Abdominal:BS + x4 in all quadrants, soft, no mass, no tenderness  Extremities: No edema, +ROM, +Weakness  Skin: Warm, Dry, no lesions, no rash, no erythema present, b/l hand ecchymosis present  Neurological: CN 2-12 intact, PERRLA  Psych: Alert and oriented times 3, pleasant mood, no affect, good judgement     Pertinent Laboratory/Diagnostic Studies:  N/A      Current Medications     Current Medications Reviewed and updated in Nursing Home EMR      Assessment and Plan     Ambulatory dysfunction   Maintain fall and safety precautions   Encourage use of call bell   Continue PT/OT services   Assist with transfers, mobility, and ADLs      Paroxysmal atrial fibrillation (H[facility])  · Continue verapamil    Essential hypertension  · BP today 133/73  · Continue amlodipine, lisinopril, and verapamil  · Avoid hypotension  · Continue to monitor BP      Type 2 diabetes mellitus without complication, without long-term current use of insulin ([facility] )    Lab Results   Component Value Date    HGBA1C 5 5 04/21/2022   · Bgs continue to be well controlled  · Continue Janumet  · Continue CCHO diet  · Continue Accu-cheks           [address] [facility]  Geriatric Medicine  5/11/2022

## 2022-05-11 NOTE — ASSESSMENT & PLAN NOTE
Lab Results   Component Value Date    HGBA1C 5 5 04/21/2022   · Bgs well controlled  · Continue Janumet  · Continue Accu-cheks

## 2022-05-11 NOTE — ASSESSMENT & PLAN NOTE
Lab Results   Component Value Date    HGBA1C 5 5 04/21/2022   · Bgs continue to be well controlled  · Continue Janumet  · Continue CCHO diet  · Continue Accu-cheks

## 2022-05-13 ENCOUNTER — NURSING HOME VISIT (OUTPATIENT)
Dept: GERIATRICS | Facility: OTHER | Age: 87
End: 2022-05-13
Payer: MEDICARE

## 2022-05-13 DIAGNOSIS — E11.9 TYPE 2 DIABETES MELLITUS WITHOUT COMPLICATION, WITHOUT LONG-TERM CURRENT USE OF INSULIN (HCC): ICD-10-CM

## 2022-05-13 DIAGNOSIS — A41.51 SEPSIS DUE TO ESCHERICHIA COLI, UNSPECIFIED WHETHER ACUTE ORGAN DYSFUNCTION PRESENT (HCC): ICD-10-CM

## 2022-05-13 DIAGNOSIS — I10 ESSENTIAL HYPERTENSION: ICD-10-CM

## 2022-05-13 DIAGNOSIS — R26.2 AMBULATORY DYSFUNCTION: Primary | ICD-10-CM

## 2022-05-13 PROCEDURE — 99309 SBSQ NF CARE MODERATE MDM 30: CPT | Performed by: NURSE PRACTITIONER

## 2022-05-16 ENCOUNTER — NURSING HOME VISIT (OUTPATIENT)
Dept: GERIATRICS | Facility: OTHER | Age: 87
End: 2022-05-16
Payer: MEDICARE

## 2022-05-16 DIAGNOSIS — R26.2 AMBULATORY DYSFUNCTION: Primary | ICD-10-CM

## 2022-05-16 DIAGNOSIS — E11.9 TYPE 2 DIABETES MELLITUS WITHOUT COMPLICATION, WITHOUT LONG-TERM CURRENT USE OF INSULIN (HCC): ICD-10-CM

## 2022-05-16 DIAGNOSIS — H35.30 ARMD (AGE RELATED MACULAR DEGENERATION): ICD-10-CM

## 2022-05-16 DIAGNOSIS — I10 ESSENTIAL HYPERTENSION: ICD-10-CM

## 2022-05-16 PROCEDURE — 99309 SBSQ NF CARE MODERATE MDM 30: CPT | Performed by: NURSE PRACTITIONER

## 2022-05-16 NOTE — PROGRESS NOTES
[address]  [phone]  [facility]   [address]  Progress Note        NAME: Kam Romo  AGE: 80 y o  SEX: female  :  1918  DATE OF ENCOUNTER: 2022    Chief Complaint   Patient seen and examined for follow up on chronic conditions  History of Present Illness     Kam Romo is a 80 y o  female patient of [facility] with acute and chronic medical conditions of Type 2 diabetes mellitus, hypertension, atrial fibrillation, hx of left-sided lacunar infarction, osteoarthritis, and ambulatory dysfunction      The patient is being seen and examined today for follow-up on acute and chronic medical conditions  Upon examination, the patient is sitting in her recliner, alert, cooperative, and in no acute distress  She denies pain, sob, chest pain, abdominal pain, fever, chills, nausea/vomiting, diarrhea/constipation, or dysuria  The patient reports she has a good appetite and is drinking an adequate amount of fluids  The patient offers no questions or concerns today       Per PT/OT the patient's ambulatory status is 90 feet with RW  and contact guard  The patient's mobility status is bed mobility requires contact guard/min assist and transfers require maximum assist   The patient's ADL status is upper body moderate assist, and lower body and toileting  both require maximum assistance  The patient scored a 19/22 on the [facility] [place] [facility] test     The following portions of the patient's history were reviewed and updated as appropriate: allergies, current medications, past family history, past medical history, past social history, past surgical history and problem list     Review of Systems     A review of systems was performed  All negative, except as per HPI  History     Past Medical History:   Diagnosis Date    Diabetes mellitus ([address] )     Hypertension     Macular degeneration      No past surgical history on file  No family history on file    Social History Socioeconomic History    Marital status:      Spouse name: Not on file    Number of children: Not on file    Years of education: Not on file    Highest education level: Not on file   Occupational History    Not on file   Tobacco Use    Smoking status: Never Smoker    Smokeless tobacco: Not on file   Substance and Sexual Activity    Alcohol use: No    Drug use: No    Sexual activity: Not on file   Other Topics Concern    Not on file   Social History Narrative    Not on file     Social Determinants of Health     Financial Resource Strain: Not on file   Food Insecurity: No Food Insecurity    Worried About Running Out of Food in the Last Year: Never true    Gilbert of Food in the Last Year: Never true   Transportation Needs: No Transportation Needs    Lack of Transportation (Medical): No    Lack of Transportation (Non-Medical):  No   Physical Activity: Not on file   Stress: Not on file   Social Connections: Not on file   Intimate Partner Violence: Not on file   Housing Stability: Unknown    Unable to Pay for Housing in the Last Year: No    Number of Places Lived in the Last Year: Not on file    Unstable Housing in the Last Year: No     No Known Allergies    Objective     Vital Signs  BP:154/57    HR:78  T:97 3     RR:18      O2Sat:97%      W:126 5  General: NAD, Well Nourished, Well Developed  Oral: Oropharynx Moist and Clear  Neck: Supple, +ROM  CV: S1, S2, normal rate, regular rhythm, + murmur appreciated  Pulmonary: Lung sounds clear to air, no wheezing, rhonchi, rales  Abdominal:BS + x4 in all quadrants, soft, no mass, no tenderness  Extremities: No edema, +ROM, +Weakness  Skin: Warm, Dry, no lesions, no rash, no erythema present, b/l hand ecchymosis present  Neurological: CN 2-12 intact, PERRLA  Psych: Alert and oriented times 3, pleasant mood, no affect, good judgement    Pertinent Laboratory/Diagnostic Studies:  CBC WITH DIFF  HEMOGLOBIN 10 1 g/dL 11 5-14 5 L Final  HEMATOCRIT 30 3 % 35 0-43 0 L Final  WBC 5 1 thou/cmm 4 0-10 0 Final  RBC 3 31 mill/cmm 3 70-4 70 L Final  PLATELET COUNT 858 thou/cmm 140-350 Final  MPV 7 0 fL 7 5-11 3 L Final  MCV 91 fL  Final  MCH 30 6 pg 26 0-34 0 Final  MCHC 33 4 g/dL 32 0-37 0 Final  RDW 13 7 % 12 0-16 0 Final  DIFFERENTIAL TYPE AUTO Final  ABSOLUTE NEUT 3 0 thou/cmm 1 8-7 8 Final  ABSOLUTE LYMPH 1 2 thou/cmm 1 0-3 0 Final  ABSOLUTE MONO 0 7 thou/cmm 0 3-1 0 Final  ABSOLUTE EOS 0 1 thou/cmm 0 0-0 5 Final  ABSOLUTE BASO 0 0 thou/cmm 0 0-0 1 Final  NEUTROPHILS 57 % Final  LYMPHOCYTES 24 % Final  MONOCYTES 15 % Final  EOSINOPHILS 3 % Final  BASOPHILS 1 % Final  BASIC METABOLIC PNL  GLUCOSE 96 mg/dL 65-99 Final  BUN 15 mg/dL 7-25 Final  CREATININE 0 44 mg/dL 0 40-1 10 Final  SODIUM 139 mmol/L 135-145 Final  POTASSIUM 4 4 mmol/L 3 5-5 2 Final  CHLORIDE 107 mmol/L 100-109 Final  CARBON DIOXIDE 26 mmol/L 23-31 Final  CALCIUM 8 3 mg/dL 8 5-10 1 L Final  ANION GAP 6 3-11 Final  eGFRcr (Note) >59 Final      Current Medications     Current Medications Reviewed and updated in Nursing Home EMR      Assessment and Plan     Ambulatory dysfunction   Maintain fall and safety precautions   Encourage use of call bell   Continue PT/OT services   Assist with transfers, mobility, and ADLs      Essential hypertension  · BP today 154/57  · Continue all BP meds  · Avoid hypotension  · Will continue to monitor BMP      Type 2 diabetes mellitus without complication, without long-term current use of insulin ([facility])    Lab Results   Component Value Date    HGBA1C 5 5 04/21/2022   · Continue Janumet    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

## 2022-05-26 ENCOUNTER — NURSING HOME VISIT (OUTPATIENT)
Dept: GERIATRICS | Facility: OTHER | Age: 87
End: 2022-05-26
Payer: MEDICARE

## 2022-05-26 DIAGNOSIS — H35.30 ARMD (AGE RELATED MACULAR DEGENERATION): ICD-10-CM

## 2022-05-26 DIAGNOSIS — I10 ESSENTIAL HYPERTENSION: ICD-10-CM

## 2022-05-26 DIAGNOSIS — M16.12 PRIMARY OSTEOARTHRITIS OF LEFT HIP: ICD-10-CM

## 2022-05-26 DIAGNOSIS — R26.2 AMBULATORY DYSFUNCTION: Primary | ICD-10-CM

## 2022-05-26 PROCEDURE — 99308 SBSQ NF CARE LOW MDM 20: CPT | Performed by: NURSE PRACTITIONER

## 2022-05-26 NOTE — PROGRESS NOTES
[address]  [phone]  [facility]   [address]  Progress Note        NAME: Loida Shepherd  AGE: 80 y o  SEX: female  :  1918  DATE OF ENCOUNTER: 2022    Chief Complaint   Patient seen and examined for follow up on chronic conditions  History of Present Illness     Loida Shepherd is a 80 y o  female patient of [facility] with acute and chronic medical conditions of Type 2 diabetes mellitus, hypertension, atrial fibrillation, hx of left-sided lacunar infarction, osteoarthritis, and ambulatory dysfunction      The patient is being seen and examined today for follow-up on acute and chronic medical conditions  Upon examination, the patient is sitting in her recliner, alert, cooperative, and in no acute distress  She denies pain, sob, chest pain, abdominal pain, fever, chills, nausea/vomiting, diarrhea/constipation, or dysuria  The patient reports she has a good appetite and is drinking an adequate amount of fluids  The patient offers no questions or concerns today       Per PT/OT the patient's ambulatory status is 90 feet with RW  and contact guard  The patient's mobility status is bed mobility requires contact guard/min assist and transfers require maximum assist   The patient's ADL status is upper body moderate assist, and lower body and toileting both require maximum assistance  The patient scored a 19/22 on the [facility] test     The following portions of the patient's history were reviewed and updated as appropriate: allergies, current medications, past family history, past medical history, past social history, past surgical history and problem list     Review of Systems     A review of systems was performed  All negative, except as per HPI  History     Past Medical History:   Diagnosis Date    Diabetes mellitus ([address] )     Hypertension     Macular degeneration      No past surgical history on file  No family history on file    Social History Socioeconomic History    Marital status:      Spouse name: Not on file    Number of children: Not on file    Years of education: Not on file    Highest education level: Not on file   Occupational History    Not on file   Tobacco Use    Smoking status: Never Smoker    Smokeless tobacco: Not on file   Substance and Sexual Activity    Alcohol use: No    Drug use: No    Sexual activity: Not on file   Other Topics Concern    Not on file   Social History Narrative    Not on file     Social Determinants of Health     Financial Resource Strain: Not on file   Food Insecurity: No Food Insecurity    Worried About Running Out of Food in the Last Year: Never true    Gilbert of Food in the Last Year: Never true   Transportation Needs: No Transportation Needs    Lack of Transportation (Medical): No    Lack of Transportation (Non-Medical):  No   Physical Activity: Not on file   Stress: Not on file   Social Connections: Not on file   Intimate Partner Violence: Not on file   Housing Stability: Unknown    Unable to Pay for Housing in the Last Year: No    Number of Places Lived in the Last Year: Not on file    Unstable Housing in the Last Year: No     No Known Allergies    Objective     Vital Signs  BP:149/74    HR:83  T:98 3     RR:18      O2Sat:94%      W:130 4  General: NAD, Well Nourished, Well Developed  Oral: Oropharynx Moist and Clear  Neck: Supple, +ROM  CV: S1, S2, normal rate, regular rhythm, + murmur appreciated  Pulmonary: Lung sounds clear to air, no wheezing, rhonchi, rales  Abdominal:BS + x4 in all quadrants, soft, no mass, no tenderness  Extremities: No edema, +ROM, +Weakness  Skin: Warm, Dry, no lesions, no rash, no erythema present, b/l hand ecchymosis present  Neurological: CN 2-12 intact, PERRLA  Psych: Alert and oriented times 3, pleasant mood, no affect, good judgement     Pertinent Laboratory/Diagnostic Studies:  CBC WITH DIFF  HEMOGLOBIN 10 1 g/dL 11 5-14 5 L Final  HEMATOCRIT 30 3 % 35 0-43 0 L Final  WBC 5 1 thou/cmm 4 0-10 0 Final  RBC 3 31 mill/cmm 3 70-4 70 L Final  PLATELET COUNT 254 thou/cmm 140-350 Final  MPV 7 0 fL 7 5-11 3 L Final  MCV 91 fL  Final  MCH 30 6 pg 26 0-34 0 Final  MCHC 33 4 g/dL 32 0-37 0 Final  RDW 13 7 % 12 0-16 0 Final  DIFFERENTIAL TYPE AUTO Final  ABSOLUTE NEUT 3 0 thou/cmm 1 8-7 8 Final  ABSOLUTE LYMPH 1 2 thou/cmm 1 0-3 0 Final  ABSOLUTE MONO 0 7 thou/cmm 0 3-1 0 Final  ABSOLUTE EOS 0 1 thou/cmm 0 0-0 5 Final  ABSOLUTE BASO 0 0 thou/cmm 0 0-0 1 Final  NEUTROPHILS 57 % Final  LYMPHOCYTES 24 % Final  MONOCYTES 15 % Final  EOSINOPHILS 3 % Final  BASOPHILS 1 % Final  BASIC METABOLIC PNL  GLUCOSE 96 mg/dL 65-99 Final  BUN 15 mg/dL 7-25 Final  CREATININE 0 44 mg/dL 0 40-1 10 Final  SODIUM 139 mmol/L 135-145 Final  POTASSIUM 4 4 mmol/L 3 5-5 2 Final  CHLORIDE 107 mmol/L 100-109 Final  CARBON DIOXIDE 26 mmol/L 23-31 Final  CALCIUM 8 3 mg/dL 8 5-10 1 L Final  ANION GAP 6 3-11 Final  eGFRcr (Note) >59 Final       Current Medications     Current Medications Reviewed and updated in Nursing Home EMR      Assessment and Plan     Ambulatory dysfunction   Maintain fall and safety precautions   Encourage use of call bell   Continue PT/OT services   Assist with transfers, mobility, and ADLs      Essential hypertension  · BP today 149/74  · Continue Verapamil and Lisinopril  · Avoid hypotension  · Will continue to monitor BMP      Type 2 diabetes mellitus without complication, without long-term current use of insulin ([facility])    Lab Results   Component Value Date    HGBA1C 5 5 04/21/2022   · Bgs well controlled  · Continue Metformin  · Continue CCHO diet  · Continue Accu-cheks       Sepsis ([address] )  · Antibiotic therapy completed with no further urinary complaints  · Continue to encourage po fluid intake  · Encourage frequent toileting and proper perineal hygiene  · Monitor urinary outpur      [facility]  Geriatric Medicine  5/13/2022

## 2022-05-26 NOTE — ASSESSMENT & PLAN NOTE
· Antibiotic therapy completed with no further urinary complaints  · Continue to encourage po fluid intake  · Encourage frequent toileting and proper perineal hygiene  · Monitor urinary outpur

## 2022-05-26 NOTE — PROGRESS NOTES
[facility]  [address] [place] [address]   [address]   [address]  Progress Note        NAME: Annalise Ryder  AGE: 80 y o  SEX: female  :  1918  DATE OF ENCOUNTER: 2022    Chief Complaint   Patient seen and examined for follow up on chronic conditions  History of Present Illness     Annalise Ryder is a 80 y o  female patient of [facility] with acute and chronic medical conditions of Type 2 diabetes mellitus, hypertension, atrial fibrillation, hx of left-sided lacunar infarction, osteoarthritis, and ambulatory dysfunction      The patient is being seen and examined today for follow-up on acute and chronic medical conditions  Upon examination, the patient is sitting in her recliner, alert, cooperative, and in no acute distress  She denies pain, sob, chest pain, abdominal pain, fever, chills, nausea/vomiting, diarrhea/constipation, or dysuria  The patient reports she has a good appetite and is drinking an adequate amount of fluids  The patient offers no questions or concerns today  The following portions of the patient's history were reviewed and updated as appropriate: allergies, current medications, past family history, past medical history, past social history, past surgical history and problem list     Review of Systems     A review of systems was performed  All negative, except as per HPI  History     Past Medical History:   Diagnosis Date    Diabetes mellitus ([facility] )     Hypertension     Macular degeneration      No past surgical history on file  No family history on file  Social History     Socioeconomic History    Marital status:      Spouse name: Not on file    Number of children: Not on file    Years of education: Not on file    Highest education level: Not on file   Occupational History    Not on file   Tobacco Use    Smoking status: Never Smoker    Smokeless tobacco: Not on file   Substance and Sexual Activity    Alcohol use:  No  Drug use: No    Sexual activity: Not on file   Other Topics Concern    Not on file   Social History Narrative    Not on file     Social Determinants of Health     Financial Resource Strain: Not on file   Food Insecurity: No Food Insecurity    Worried About Running Out of Food in the Last Year: Never true    Gilbert of Food in the Last Year: Never true   Transportation Needs: No Transportation Needs    Lack of Transportation (Medical): No    Lack of Transportation (Non-Medical): No   Physical Activity: Not on file   Stress: Not on file   Social Connections: Not on file   Intimate Partner Violence: Not on file   Housing Stability: Unknown    Unable to Pay for Housing in the Last Year: No    Number of Places Lived in the Last Year: Not on file    Unstable Housing in the Last Year: No     No Known Allergies    Objective     Vital Signs  BP:152/73    HR:90  T:97     RR:20      O2Sat:97%      W:134  General: NAD, Well Nourished, Well Developed  Oral: Oropharynx Moist and Clear  Neck: Supple, +ROM  CV: S1, S2, normal rate, regular rhythm, + murmur appreciated  Pulmonary: Lung sounds clear to air, no wheezing, rhonchi, rales  Abdominal:BS + x4 in all quadrants, soft, no mass, no tenderness  Extremities: No edema, +ROM, +Weakness  Skin: Warm, Dry, no lesions, no rash, no erythema present, b/l hand ecchymosis present  Neurological: CN 2-12 intact, PERRLA  Psych: Alert and oriented times 3, pleasant mood, no affect, good judgement    Pertinent Laboratory/Diagnostic Studies:  N/a      Current Medications     Current Medications Reviewed and updated in Nursing Home EMR      Assessment and Plan     Ambulatory dysfunction   Maintain fall and safety precautions   Encourage use of call bell   Continue to encourage use of assistive devices   Encourage exercise as tolerated with assistance   Assist with transfers, mobility, and ADLs      ARMD (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 degeneration)  · Keep well lit areas for patient  · Follow-up with Ophthalmology    Essential hypertension  · BP today 152/73  · Continue Verapamil and Lisinopril  · Avoid hypotension  · Will continue to monitor BMP      Primary osteoarthritis of left hip  · Patient has no c/o pain today  · Encourage exercise as tolerated with assistance and use of asst device  · Continue multivitamin      [facility]  Geriatric Medicine  5/26/2022

## 2022-05-26 NOTE — ASSESSMENT & PLAN NOTE
 Maintain fall and safety precautions   Encourage use of call bell   Continue to encourage use of assistive devices   Encourage exercise as tolerated with assistance   Assist with transfers, mobility, and ADLs

## 2022-05-26 NOTE — ASSESSMENT & PLAN NOTE
· BP today 152/73  · Continue Verapamil and Lisinopril  · Avoid hypotension  · Will continue to monitor BMP

## 2022-05-26 NOTE — ASSESSMENT & PLAN NOTE
· BP today 149/74  · Continue Verapamil and Lisinopril  · Avoid hypotension  · Will continue to monitor BMP

## 2022-05-26 NOTE — ASSESSMENT & PLAN NOTE
· Patient has no c/o pain today  · Encourage exercise as tolerated with assistance and use of asst device  · Continue multivitamin

## 2022-06-27 ENCOUNTER — NURSING HOME VISIT (OUTPATIENT)
Dept: GERIATRICS | Facility: OTHER | Age: 87
End: 2022-06-27
Payer: MEDICARE

## 2022-06-27 DIAGNOSIS — I10 ESSENTIAL HYPERTENSION: ICD-10-CM

## 2022-06-27 DIAGNOSIS — R53.81 DEBILITY: ICD-10-CM

## 2022-06-27 DIAGNOSIS — E11.9 TYPE 2 DIABETES MELLITUS WITHOUT COMPLICATION, WITHOUT LONG-TERM CURRENT USE OF INSULIN (HCC): Primary | ICD-10-CM

## 2022-06-27 DIAGNOSIS — D64.9 ANEMIA, UNSPECIFIED TYPE: ICD-10-CM

## 2022-06-27 DIAGNOSIS — I48.0 PAROXYSMAL ATRIAL FIBRILLATION (HCC): ICD-10-CM

## 2022-06-27 PROCEDURE — 99309 SBSQ NF CARE MODERATE MDM 30: CPT | Performed by: FAMILY MEDICINE

## 2022-06-27 NOTE — PROGRESS NOTES
[address] [facility]  Progress Note  POS: Nursing Facility/LT-32    Chief Complaint/Reason for visit: Follow up of chronic medical conditions  History of Present Illness: 8 year old female evaluated for routine follow up of chronic medical conditions  Continues on janumet for DM2; routine accuchecks are not performed; most recent A1c very well controlled  Concern for difficulty swallowing pills when in bed, requests pills to be administered once she is out of bed in her chair instead, nursing made aware  Continues on lisinopril, amlodipine for hypertension; SBP trending 130s-140s; verapamil for rate control for pAfib, HR trending 80s-90s  Past Medical History: reviewed and updated  Review of systems: Review of Systems   Constitutional: Negative for activity change, appetite change, chills and fever  HENT: Positive for trouble swallowing (see HPI)  Respiratory: Negative for cough and shortness of breath  Musculoskeletal: Negative for arthralgias  Neurological: Negative for dizziness and light-headedness  All other systems reviewed and are negative  Medications: No changes made  Medication list reviewed and updated in Epic to reflect most current SNF orders  Allergies: NKDA  Labs/Diagnostics (reviewed by this provider): Copy in Chart  HbA1c:(4/21/22) 5 5  CBC: (5/12/22) Hb:10 1  BMP: (5/12/22)    Physical Exam    Weight: 126 8lb Temp:98 4F BP:148/70 Pulse:94 Resp:20 O2 Sat:93%RA    Physical Exam  Vitals and nursing note reviewed  Constitutional:       General: She is awake  She is not in acute distress  Appearance: She is well-developed and well-groomed  She is not ill-appearing, toxic-appearing or diaphoretic  HENT:      Head: Normocephalic and atraumatic  Right Ear: External ear normal       Left Ear: External ear normal       Nose: No rhinorrhea  Mouth/Throat:      Mouth: Mucous membranes are moist       Pharynx: Oropharynx is clear     Eyes: General: No scleral icterus  Right eye: No discharge  Left eye: No discharge  Conjunctiva/sclera: Conjunctivae normal    Cardiovascular:      Rate and Rhythm: Normal rate and regular rhythm  Heart sounds: S1 normal and S2 normal    Pulmonary:      Effort: Pulmonary effort is normal  No respiratory distress  Breath sounds: No wheezing or rhonchi  Abdominal:      General: There is no distension  Palpations: Abdomen is soft  Musculoskeletal:      Cervical back: No rigidity  Skin:     General: Skin is warm and dry  Coloration: Skin is not jaundiced or pale  Neurological:      Mental Status: She is alert  Cranial Nerves: No dysarthria  Motor: No tremor  Psychiatric:         Attention and Perception: Attention and perception normal          Mood and Affect: Mood and affect normal          Speech: Speech normal          Behavior: Behavior is cooperative         Assessment/Plan:  8 year old female with:    Type 2 diabetes mellitus without complication, without long-term current use of insulin (HCC)    Lab Results   Component Value Date    HGBA1C 5 5 04/21/2022   Goal A1c <8 5 given age and comorbidities  A1c, BMP ordered for 6/30  If A1c<7, will d/c janumet and administer metformin only, 500mg once daily    Anemia  CBC ordered    Essential hypertension  Goal <140-150/90; avoid hypotension  Continue amlodipine, lisinopril, verapamil  SBP trending 130s-140s    Paroxysmal atrial fibrillation (HCC)  Rate controlled with verapamil, goal HR<100  No anticoagulation at baseline    Debility  Multifactorial  Continue LTC for 24/7 and ADL care  Supportive care, nutritional support  Fall precautions  Management of chronic medical conditions as outlined    Sebastien Almanza DO  6/27/22

## 2022-07-02 ENCOUNTER — TELEPHONE (OUTPATIENT)
Dept: OTHER | Facility: OTHER | Age: 87
End: 2022-07-02

## 2022-07-02 NOTE — TELEPHONE ENCOUNTER
Iliana called, requesting a call back from on call provider regarding pt having a fever   Provider paged via [facility]

## 2022-07-09 PROBLEM — D64.9 ANEMIA: Status: ACTIVE | Noted: 2022-07-09

## 2022-07-09 PROBLEM — R78.81 BACTEREMIA: Status: RESOLVED | Noted: 2022-04-22 | Resolved: 2022-07-09

## 2022-07-09 PROBLEM — R53.81 DEBILITY: Status: ACTIVE | Noted: 2022-07-09

## 2022-07-09 PROBLEM — A41.9 SEPSIS (HCC): Status: RESOLVED | Noted: 2022-04-21 | Resolved: 2022-07-09

## 2022-07-09 PROBLEM — R74.01 TRANSAMINITIS: Status: RESOLVED | Noted: 2022-04-22 | Resolved: 2022-07-09

## 2022-07-09 PROBLEM — R54 ADVANCED AGE: Status: RESOLVED | Noted: 2019-10-22 | Resolved: 2022-07-09

## 2022-07-10 NOTE — ASSESSMENT & PLAN NOTE
Multifactorial  Continue LTC for 24/7 and ADL care  Supportive care, nutritional support  Fall precautions  Management of chronic medical conditions as outlined

## 2022-07-10 NOTE — ASSESSMENT & PLAN NOTE
Lab Results   Component Value Date    HGBA1C 5 5 04/21/2022   Goal A1c <8 5 given age and comorbidities  A1c, BMP ordered for 6/30  If A1c<7, will d/c janumet and administer metformin only, 500mg once daily

## 2022-07-10 NOTE — ASSESSMENT & PLAN NOTE
Goal <140-150/90; avoid hypotension  Continue amlodipine, lisinopril, verapamil  SBP trending 130s-140s

## 2022-08-01 ENCOUNTER — NURSING HOME VISIT (OUTPATIENT)
Dept: GERIATRICS | Facility: OTHER | Age: 87
End: 2022-08-01
Payer: MEDICARE

## 2022-08-01 DIAGNOSIS — D64.9 ANEMIA, UNSPECIFIED TYPE: ICD-10-CM

## 2022-08-01 DIAGNOSIS — E11.9 TYPE 2 DIABETES MELLITUS WITHOUT COMPLICATION, WITHOUT LONG-TERM CURRENT USE OF INSULIN (HCC): Primary | ICD-10-CM

## 2022-08-01 DIAGNOSIS — R53.81 DEBILITY: ICD-10-CM

## 2022-08-01 DIAGNOSIS — I10 ESSENTIAL HYPERTENSION: ICD-10-CM

## 2022-08-01 PROCEDURE — 99309 SBSQ NF CARE MODERATE MDM 30: CPT | Performed by: FAMILY MEDICINE

## 2022-08-01 NOTE — PROGRESS NOTES
[address] [facility]  Progress Note  POS: Nursing Facility/LTC-    Chief Complaint/Reason for visit: Routine follow up of chronic medical conditions  History of Present Illness: 8 year old female evaluated for routine follow up of chronic medical conditions  Continues on multiple agents for hypertension, BP reading high today (isolated), as SBP otherwise trending in the 130s  Weight stable  Diabetes very well controlled with A1c<6, transitioned to low dose metformin  Past Medical History: Reviewed and unchanged  Review of systems: Review of Systems   Constitutional: Negative for chills, fever and unexpected weight change  HENT: Positive for hearing loss (chronic)  Eyes: Positive for visual disturbance (chronic)  Respiratory: Negative for cough and shortness of breath  Cardiovascular: Negative for leg swelling  Psychiatric/Behavioral: Positive for sleep disturbance (insomnia at times)  All other systems reviewed and are negative  Medications: Changes made- see written orders  Medication list reviewed and updated in Epic to reflect most current SNF orders  Allergies: NKDA  Labs/Diagnostics (reviewed by this provider): Copy in Chart (6/30/22)  HbA1c:5 7  CBC: Hb:11 0 PLT:370  BMP: Na:139 K:4 7 Cr:0 59    Physical Exam    Weight: 124 4lb Temp:98 2F BP:165/73 Pulse:85 Resp:20 O2 Sat:96%RA    Physical Exam  Vitals and nursing note reviewed  Constitutional:       General: She is awake  She is not in acute distress  Appearance: She is well-developed and well-groomed  She is not toxic-appearing or diaphoretic  HENT:      Head: Normocephalic and atraumatic  Right Ear: External ear normal       Left Ear: External ear normal       Nose: No rhinorrhea  Mouth/Throat:      Mouth: Mucous membranes are moist    Eyes:      General: No scleral icterus  Right eye: No discharge  Left eye: No discharge        Conjunctiva/sclera: Conjunctivae normal    Pulmonary:      Effort: Pulmonary effort is normal  No respiratory distress  Abdominal:      General: There is no distension  Musculoskeletal:      Cervical back: No rigidity  Skin:     Coloration: Skin is not jaundiced or pale  Neurological:      Mental Status: She is alert  Mental status is at baseline  Cranial Nerves: No facial asymmetry  Psychiatric:         Attention and Perception: Attention and perception normal          Mood and Affect: Mood and affect normal          Behavior: Behavior is cooperative         Assessment/Plan:  8 year old female with:    Type 2 diabetes mellitus without complication, without long-term current use of insulin ([facility])  A1c 5 7; at goal A1c <8 5 given age and comorbidities  D/c janumet and change to metformin only, 500mg once daily  Repeat A1c and BMP in 3-4 months    Essential hypertension  Goal <140-150/90; avoid hypotension  Continue amlodipine, lisinopril, verapamil    Anemia  CBC reviewed; Hgb improved 10 1->11  Repeat CBC in approx 3 months    Debility  Multifactorial  Continue LTC for 24/7 and ADL care  Supportive care, nutritional support  Fall precautions  Management of chronic medical conditions as outlined    Nicolas Hernadez DO  8/1/22

## 2022-08-09 NOTE — ASSESSMENT & PLAN NOTE
A1c 5 7; at goal A1c <8 5 given age and comorbidities  D/c janumet and change to metformin only, 500mg once daily  Repeat A1c and BMP in 3-4 months

## 2022-08-25 ENCOUNTER — NURSING HOME VISIT (OUTPATIENT)
Dept: GERIATRICS | Facility: OTHER | Age: 87
End: 2022-08-25
Payer: MEDICARE

## 2022-08-25 DIAGNOSIS — I10 ESSENTIAL HYPERTENSION: ICD-10-CM

## 2022-08-25 DIAGNOSIS — R53.81 DEBILITY: ICD-10-CM

## 2022-08-25 DIAGNOSIS — E11.9 TYPE 2 DIABETES MELLITUS WITHOUT COMPLICATION, WITHOUT LONG-TERM CURRENT USE OF INSULIN (HCC): Primary | ICD-10-CM

## 2022-08-25 PROCEDURE — 99309 SBSQ NF CARE MODERATE MDM 30: CPT | Performed by: FAMILY MEDICINE

## 2022-08-25 NOTE — PROGRESS NOTES
[facility]  Progress Note  POS: Nursing Facility/LT-32    Chief Complaint/Reason for visit: Routine follow up of chronic medical conditions  History of Present Illness: 8 year old female evaluated for routine follow up of chronic medical conditions  Continues on metformin once daily for DM2; jaunmet previously discontinued  No pain or discomfort reported  Continues on multiple agents for hypertension, SBP generally trending 120s-130s with isolated elevation to 160s noted per last BP log on 7/21/22  Past Medical History: Reviewed and unchanged  Review of systems: Review of Systems   Constitutional: Negative for chills and fever  Eyes: Negative for visual disturbance  Respiratory: Negative for cough and shortness of breath  Cardiovascular: Negative for leg swelling  Gastrointestinal: Negative for abdominal pain  Musculoskeletal: Negative for arthralgias  All other systems reviewed and are negative  Medications: No changes made  Allergies: NKDA    Physical Exam    Weight: 128 4lb Temp:98 1F Pulse:85 Resp:18 O2 Sat:97%RA    Physical Exam  Vitals and nursing note reviewed  Constitutional:       General: She is awake  She is not in acute distress  Appearance: She is well-developed and well-groomed  She is not toxic-appearing or diaphoretic  HENT:      Head: Normocephalic and atraumatic  Right Ear: External ear normal       Left Ear: External ear normal       Nose: No rhinorrhea  Mouth/Throat:      Mouth: Mucous membranes are moist    Eyes:      General:         Right eye: No discharge  Left eye: No discharge  Pulmonary:      Effort: Pulmonary effort is normal  No respiratory distress  Abdominal:      General: There is no distension  Musculoskeletal:      Cervical back: No rigidity  Skin:     Coloration: Skin is not jaundiced or pale  Neurological:      Mental Status: She is alert  Mental status is at baseline  Cranial Nerves: No dysarthria or facial asymmetry     Psychiatric:         Attention and Perception: Attention and perception normal          Speech: Speech normal        Assessment/Plan:  8 year old female with:    Type 2 diabetes mellitus without complication, without long-term current use of insulin ([facility])  Most recent A1c 5 7; at goal A1c <8 5 given age and comorbidities  Continue metformin 500mg once daily  Repeat A1c and BMP in October with plan for complete d/c of metformin if A1c remains <7    Essential hypertension  Goal <140-150/90; avoid hypotension  Continue amlodipine, lisinopril, verapamil    Debility  Multifactorial  Continue LTC for 24/7 and ADL care  Supportive care, nutritional support  Fall precautions  Management of chronic medical conditions as outlined    Sebastien Almanza DO  8/25/22

## 2022-08-29 ENCOUNTER — NURSING HOME VISIT (OUTPATIENT)
Dept: GERIATRICS | Facility: OTHER | Age: 87
End: 2022-08-29
Payer: MEDICARE

## 2022-08-29 VITALS
SYSTOLIC BLOOD PRESSURE: 165 MMHG | OXYGEN SATURATION: 98 % | TEMPERATURE: 98.4 F | RESPIRATION RATE: 20 BRPM | DIASTOLIC BLOOD PRESSURE: 73 MMHG | HEART RATE: 85 BPM

## 2022-08-29 DIAGNOSIS — I10 ESSENTIAL HYPERTENSION: ICD-10-CM

## 2022-08-29 DIAGNOSIS — H02.9: Primary | ICD-10-CM

## 2022-08-29 PROCEDURE — 99308 SBSQ NF CARE LOW MDM 20: CPT | Performed by: NURSE PRACTITIONER

## 2022-08-29 NOTE — PROGRESS NOTES
Bryan 11 at [facility], [address] [place] [address]  [phone] or [phone]    LTC: POS 32  Acute Visit Note    ASSESSMENT AND PLAN:  1  Lesion of canthus of left eye  Assessment & Plan:  · Erythromycin eye ointment ordered TID for 7 days  · Will continue to monitor      2  Essential hypertension  Assessment & Plan:  · Last BP mildly elevated  · Will repeat vital signs in the am  · Continue current antihypertensive medication regime        HPI:    [place]  is a 8 year old patient seen and examined in LT  Per nursing request for a blister that opened to her left lower eyelid  Over this past weekend, it was reported that the patients left eye was red with a small amount of drainage present  Upon examination, patient is calm cooperative and in no acute distress  Patient states, that she has had a lesion of her left eye for a long time  She states that someone tried to remove it and it opened up  No eye pain or increased visual disturbance reported  ROS: Review of Systems   Constitutional: Negative for chills, diaphoresis, fatigue and fever  Eyes: Positive for discharge and redness  Negative for photophobia, pain, itching and visual disturbance  Skin: Negative for color change, pallor and rash  Left lower eyelid lesion     Neurological: Negative for dizziness, weakness, light-headedness and headaches         No Known Allergies    Medications:    Current Outpatient Medications on File Prior to Visit   Medication Sig Dispense Refill    amLODIPine (NORVASC) 5 mg tablet TAKE 1 TABLET(5 MG) BY MOUTH DAILY 90 tablet 3    lisinopril (ZESTRIL) 20 mg tablet TAKE 1 TABLET(20 MG) BY MOUTH TWICE DAILY 180 tablet 3    metFORMIN (GLUCOPHAGE) 500 mg tablet Take 500 mg by mouth daily with dinner      verapamil (VERELAN PM) 180 MG 24 hr capsule TAKE 1 CAPSULE(180 MG) BY MOUTH DAILY 90 capsule 3     No current facility-administered medications on file prior to visit  History:  Past Medical History:   Diagnosis Date    Diabetes mellitus ([facility] [address] )     Hypertension     Macular degeneration      No past surgical history on file  No family history on file  Social History     Socioeconomic History    Marital status:      Spouse name: Not on file    Number of children: Not on file    Years of education: Not on file    Highest education level: Not on file   Occupational History    Not on file   Tobacco Use    Smoking status: Never Smoker    Smokeless tobacco: Never Used   Substance and Sexual Activity    Alcohol use: No    Drug use: No    Sexual activity: Not on file   Other Topics Concern    Not on file   Social History Narrative    Not on file     Social Determinants of Health     Financial Resource Strain: Not on file   Food Insecurity: No Food Insecurity    Worried About Running Out of Food in the Last Year: Never true    [place] of Food in the Last Year: Never true   Transportation Needs: No Transportation Needs    Lack of Transportation (Medical): No    Lack of Transportation (Non-Medical): No   Physical Activity: Not on file   Stress: Not on file   Social Connections: Not on file   Intimate Partner Violence: Not on file   Housing Stability: Unknown    Unable to Pay for Housing in the Last Year: No    Number of Places Lived in the Last Year: Not on file    Unstable Housing in the Last Year: No     No past surgical history on file  OBJECTIVE:  There were no vitals filed for this visit  There is no height or weight on file to calculate BMI  Physical Exam  Constitutional:       General: She is not in acute distress  Appearance: Normal appearance  She is normal weight  She is not ill-appearing, toxic-appearing or diaphoretic  HENT:      Head: Normocephalic and atraumatic  Nose: Nose normal    Eyes:      General: No scleral icterus  Right eye: No discharge  Left eye: Discharge present       Extraocular Movements: Extraocular movements intact  Conjunctiva/sclera: Conjunctivae normal    Pulmonary:      Effort: Pulmonary effort is normal    Musculoskeletal:      Cervical back: Normal range of motion and neck supple  Skin:     General: Skin is warm and dry  Coloration: Skin is not pale  Findings: Lesion present  No erythema or rash  Comments: Left lower eyelid lesion with scab   Neurological:      General: No focal deficit present  Mental Status: She is alert  Mental status is at baseline  Psychiatric:         Mood and Affect: Mood normal          Behavior: Behavior normal          Thought Content: Thought content normal          Labs & Imaging:  Lab Results   Component Value Date    WBC 6 91 04/27/2022    HGB 10 0 (L) 04/27/2022    HCT 30 7 (L) 04/27/2022    MCV 98 04/27/2022     04/27/2022     Lab Results   Component Value Date    SODIUM 138 04/26/2022    K 4 5 04/26/2022     04/26/2022    CO2 26 04/26/2022    BUN 24 04/26/2022    CREATININE 0 65 04/26/2022    GLUC 103 04/26/2022    CALCIUM 8 2 (L) 04/26/2022     No results found for: AXOTCIFO17  No results found for: [id number], TSH  No results found for: VQLX70LFXINR, XAME07HMBMBX   Results for orders placed during the hospital encounter of 02/21/17    CT head wo contrast    Narrative  CT BRAIN - WITHOUT CONTRAST    INDICATION:  Fall  Confusion  COMPARISON:  April 5, 2010    TECHNIQUE:  CT examination of the brain was performed  In addition to axial images, coronal reformatted images were created and submitted for interpretation  This examination, like all CT scans performed in the [facility], was  performed utilizing techniques to minimize radiation dose exposure, including the use of iterative reconstruction and automated exposure control  IMAGE QUALITY:  Diagnostic      FINDINGS:    PARENCHYMA:  Decreased attenuation is noted in the supratentorial white matter demonstrating an appearance most consistent with moderate microangiopathic change  Chronic lacunar infarctions are noted in the basal ganglia, larger on the left than the  right  Small chronic infarction noted in the medial right occipital lobe, not present on April 5, 2010 examination  Atherosclerotic carotid and vertebral artery calcifications are noted  No intracranial mass, mass effect or midline shift  No acute intracranial hemorrhage  No CT signs of acute infarction  VENTRICLES AND EXTRA-AXIAL SPACES:  Age-appropriate volume loss is noted  No hydrocephalus  VISUALIZED ORBITS AND PARANASAL SINUSES:  Unremarkable  CALVARIUM AND EXTRACRANIAL SOFT TISSUES:   Normal     Impression  No acute intracranial abnormality  Chronic small large vessel ischemic changes as described        Workstation performed: [id number]

## 2022-08-29 NOTE — ASSESSMENT & PLAN NOTE
· Last BP mildly elevated  · Will repeat vital signs in the am  · Continue current antihypertensive medication regime

## 2022-09-01 NOTE — ASSESSMENT & PLAN NOTE
Most recent A1c 5 7; at goal A1c <8 5 given age and comorbidities  Continue metformin 500mg once daily  Repeat A1c and BMP in October with plan for complete d/c of metformin if A1c remains <7

## 2022-10-18 ENCOUNTER — NURSING HOME VISIT (OUTPATIENT)
Dept: GERIATRICS | Facility: OTHER | Age: 87
End: 2022-10-18
Payer: MEDICARE

## 2022-10-18 DIAGNOSIS — R53.81 DEBILITY: ICD-10-CM

## 2022-10-18 DIAGNOSIS — D64.9 ANEMIA, UNSPECIFIED TYPE: ICD-10-CM

## 2022-10-18 DIAGNOSIS — I10 ESSENTIAL HYPERTENSION: Primary | ICD-10-CM

## 2022-10-18 DIAGNOSIS — E11.9 TYPE 2 DIABETES MELLITUS WITHOUT COMPLICATION, WITHOUT LONG-TERM CURRENT USE OF INSULIN (HCC): ICD-10-CM

## 2022-10-18 PROCEDURE — 99309 SBSQ NF CARE MODERATE MDM 30: CPT | Performed by: FAMILY MEDICINE

## 2022-10-19 NOTE — PROGRESS NOTES
[address] [facility]  Progress Note  POS: Nursing Facility/LT-    Chief Complaint/Reason for visit: Routine follow up of chronic medical conditions  History of Present Illness: 8 year old female evaluated for routine follow up of chronic medical conditions  On 3 agents for hypertension, BP log reviewed in [facility] EMR and overall within acceptable range; no hypotension noted  Continues on metformin once daily for DM2, most recent A1c at goal; due for A1c, BMP  Past Medical History: Reviewed and unchanged  Review of systems: Review of Systems   Constitutional: Negative for chills, fever and unexpected weight change (desired weight gain of 2lb/1month)  Respiratory: Negative for cough and shortness of breath  All other systems reviewed and are negative  Medications: No changes made  Allergies: NKDA    Physical Exam    Weight: 132lb Temp:97 7F BP:165/65 Pulse:79 Resp:20 O2 Sat:96%RA    Physical Exam  Vitals and nursing note reviewed  Constitutional:       General: She is awake  She is not in acute distress  Appearance: She is well-developed and well-groomed  She is not toxic-appearing or diaphoretic  HENT:      Head: Normocephalic and atraumatic  Right Ear: External ear normal       Left Ear: External ear normal       Nose: No rhinorrhea  Mouth/Throat:      Mouth: Mucous membranes are moist    Eyes:      General: No scleral icterus  Right eye: No discharge  Left eye: No discharge  Conjunctiva/sclera: Conjunctivae normal    Pulmonary:      Effort: Pulmonary effort is normal  No respiratory distress  Abdominal:      General: There is no distension  Musculoskeletal:      Cervical back: No rigidity  Right lower leg: No edema  Left lower leg: No edema  Skin:     Coloration: Skin is not jaundiced or pale  Neurological:      Mental Status: She is alert  Mental status is at baseline  Cranial Nerves: No facial asymmetry  Psychiatric:         Attention and Perception: Attention and perception normal          Mood and Affect: Mood and affect normal          Behavior: Behavior is cooperative         Assessment/Plan:  8 year old female with:    Essential hypertension  Goal <140-150/90; avoid hypotension  Continue monthly BP checks, SBPs generally 120s-140s with intermittent outliers in 160s  Continue amlodipine, lisinopril, verapamil  Due for BMP Dec 2022    Anemia  Due for CBC in December     Type 2 diabetes mellitus without complication, without long-term current use of insulin ([facility] [address] )  Most recent A1c 5 7; at goal A1c <8 5 given age and comorbidities  Continue metformin 500mg once daily  Repeat A1c and BMP to be drawn with routine labs in Nov/Dec with plan for complete d/c of metformin if A1c remains <7    Debility  Multifactorial  Continue LTC for 24/7 and ADL care  Supportive care, nutritional support  Fall precautions  Management of chronic medical conditions as outlined    Negrito Pulido DO  10/18/22

## 2022-10-20 NOTE — ASSESSMENT & PLAN NOTE
Most recent A1c 5 7; at goal A1c <8 5 given age and comorbidities  Continue metformin 500mg once daily  Repeat A1c and BMP to be drawn with routine labs in Nov/Dec with plan for complete d/c of metformin if A1c remains <7

## 2022-11-22 ENCOUNTER — NURSING HOME VISIT (OUTPATIENT)
Dept: GERIATRICS | Facility: OTHER | Age: 87
End: 2022-11-22

## 2022-11-22 DIAGNOSIS — D64.9 ANEMIA, UNSPECIFIED TYPE: ICD-10-CM

## 2022-11-22 DIAGNOSIS — I10 ESSENTIAL HYPERTENSION: Primary | ICD-10-CM

## 2022-11-22 DIAGNOSIS — H35.30 ARMD (AGE RELATED MACULAR DEGENERATION): ICD-10-CM

## 2022-11-22 DIAGNOSIS — E11.9 TYPE 2 DIABETES MELLITUS WITHOUT COMPLICATION, WITHOUT LONG-TERM CURRENT USE OF INSULIN (HCC): ICD-10-CM

## 2022-11-22 DIAGNOSIS — R53.81 DEBILITY: ICD-10-CM

## 2022-11-22 NOTE — PROGRESS NOTES
[address] [facility]  Progress Note  POS: Nursing Facility/[facility]-    Chief Complaint/Reason for visit: Follow up of chronic medical conditions   History of Present Illness: 8 year old female evaluated for routine follow up of chronic medical conditions  Weight gain of 6 6lb over past month noted; no increased LE edema or SOB reported  SBP elevated in 160s per 2 most recent monthly BP checks; on 3 antihypertensive agents  Past Medical History: Reviewed and unchanged  Review of systems: Review of Systems   Constitutional: Positive for unexpected weight change  Negative for fever  Respiratory: Negative for cough and shortness of breath  Cardiovascular: Negative for leg swelling  All other systems reviewed and are negative  Medications: No changes made  Allergies: NKDA    Physical Exam    Weight: 138 6lb Temp:98 3F BP:166/65 Pulse:83 Resp:20 O2 Sat:93%RA    Physical Exam  Vitals and nursing note reviewed  Constitutional:       General: She is not in acute distress  Appearance: She is not diaphoretic  HENT:      Head: Normocephalic and atraumatic  Right Ear: External ear normal       Left Ear: External ear normal       Nose: No rhinorrhea  Mouth/Throat:      Mouth: Mucous membranes are moist    Eyes:      General: No scleral icterus  Right eye: No discharge  Left eye: No discharge  Pulmonary:      Effort: Pulmonary effort is normal  No respiratory distress  Musculoskeletal:      Cervical back: No rigidity  Right lower leg: No edema  Left lower leg: No edema  Skin:     Coloration: Skin is not jaundiced or pale  Neurological:      Mental Status: She is alert  Mental status is at baseline  Cranial Nerves: No dysarthria or facial asymmetry  Psychiatric:         Attention and Perception: Attention and perception normal          Behavior: Behavior is cooperative         Assessment/Plan:  8 year old female with:    Essential hypertension  Goal <140-150/90; avoid hypotension  SBP noted to be elevated in 160s per most two most recent monthly BP checks- order daily BP checks x1 week  Continue amlodipine, lisinopril, verapamil  BMP ordered to be drawn on 12/20    Type 2 diabetes mellitus without complication, without long-term current use of insulin ([facility] [address] )  Most recent A1c 5 7; at goal A1c <8 5 given age and comorbidities  Continue metformin 500mg once daily  Repeat A1c and BMP ordered to be drawn 12/20  D/c metformin if A1c remains <7 5    Anemia  CBC ordered    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

## 2022-12-06 NOTE — ASSESSMENT & PLAN NOTE
Most recent A1c 5 7; at goal A1c <8 5 given age and comorbidities  Continue metformin 500mg once daily  Repeat A1c and BMP ordered to be drawn 12/20  D/c metformin if A1c remains <7 5

## 2022-12-06 NOTE — ASSESSMENT & PLAN NOTE
Goal <140-150/90; avoid hypotension  SBP noted to be elevated in 160s per most two most recent monthly BP checks- order daily BP checks x1 week  Continue amlodipine, lisinopril, verapamil  BMP ordered to be drawn on 12/20

## 2022-12-15 ENCOUNTER — NURSING HOME VISIT (OUTPATIENT)
Dept: GERIATRICS | Facility: OTHER | Age: 87
End: 2022-12-15

## 2022-12-15 DIAGNOSIS — H02.9: Primary | ICD-10-CM

## 2023-01-09 NOTE — PROGRESS NOTES
[facility] at [address], [address] [facility]  [phone]    Acute Visit Note  POS 32    ASSESSMENT AND PLAN:  1  Lesion of canthus of left eye  Assessment & Plan:  · Chronic  · Will order repeat course of Erythromycin  · Continue warm compresses        HPI:    8 year old patient seen and examined per nursing request for irritation of left eye  Nursing reports, "Resident noted to be bleeding scantly from skin underneath left eye  Bleeding controlled  Area cleansed with NSS and manuel applied, and left open to air  Area appears to be either a stye or a pimple  Resident stated that she was scratching at the area  "    ROS: Review of Systems   Constitutional: Negative for chills, fatigue and fever  Eyes: Positive for discharge, redness and visual disturbance  Negative for pain and itching  Neurological: Negative for dizziness, weakness, light-headedness and headaches  No Known Allergies    Medications:    Current Outpatient Medications on File Prior to Visit   Medication Sig Dispense Refill   • amLODIPine (NORVASC) 5 mg tablet TAKE 1 TABLET(5 MG) BY MOUTH DAILY 90 tablet 3   • lisinopril (ZESTRIL) 20 mg tablet TAKE 1 TABLET(20 MG) BY MOUTH TWICE DAILY 180 tablet 3   • metFORMIN (GLUCOPHAGE) 500 mg tablet Take 500 mg by mouth daily with dinner     • verapamil (VERELAN PM) 180 MG 24 hr capsule TAKE 1 CAPSULE(180 MG) BY MOUTH DAILY 90 capsule 3     No current facility-administered medications on file prior to visit  History:  Past Medical History:   Diagnosis Date   • Diabetes mellitus ([facility] [address] )    • Hypertension    • Macular degeneration      No past surgical history on file  No family history on file  Social History     Socioeconomic History   • Marital status:       Spouse name: Not on file   • Number of children: Not on file   • Years of education: Not on file   • Highest education level: Not on file   Occupational History   • Not on file Tobacco Use   • Smoking status: Never   • Smokeless tobacco: Never   Substance and Sexual Activity   • Alcohol use: No   • Drug use: No   • Sexual activity: Not on file   Other Topics Concern   • Not on file   Social History Narrative   • Not on file     Social Determinants of Health     Financial Resource Strain: Not on file   Food Insecurity: No Food Insecurity   • Worried About Running Out of Food in the Last Year: Never true   • Ran Out of Food in the Last Year: Never true   Transportation Needs: No Transportation Needs   • Lack of Transportation (Medical): No   • Lack of Transportation (Non-Medical): No   Physical Activity: Not on file   Stress: Not on file   Social Connections: Not on file   Intimate Partner Violence: Not on file   Housing Stability: Unknown   • Unable to Pay for Housing in the Last Year: No   • Number of Places Lived in the Last Year: Not on file   • Unstable Housing in the Last Year: No     No past surgical history on file  OBJECTIVE:  There were no vitals filed for this visit  There is no height or weight on file to calculate BMI  Physical Exam  Constitutional:       General: She is not in acute distress  Appearance: Normal appearance  She is not ill-appearing  HENT:      Right Ear: External ear normal       Left Ear: External ear normal       Nose: Nose normal  No congestion or rhinorrhea  Mouth/Throat:      Mouth: Mucous membranes are moist    Eyes:      General:         Left eye: Discharge present  Extraocular Movements: Extraocular movements intact  Conjunctiva/sclera: Conjunctivae normal       Comments: Left lower eyelid lesion   Pulmonary:      Effort: No respiratory distress  Skin:     General: Skin is warm and dry  Neurological:      General: No focal deficit present  Mental Status: She is alert  Mental status is at baseline     Psychiatric:         Mood and Affect: Mood normal          Behavior: Behavior normal          Labs & Imaging:  Lab Results   Component Value Date    WBC 6 91 04/27/2022    HGB 10 0 (L) 04/27/2022    HCT 30 7 (L) 04/27/2022    MCV 98 04/27/2022     04/27/2022     Lab Results   Component Value Date    SODIUM 138 04/26/2022    K 4 5 04/26/2022     04/26/2022    CO2 26 04/26/2022    BUN 24 04/26/2022    CREATININE 0 65 04/26/2022    GLUC 103 04/26/2022    CALCIUM 8 2 (L) 04/26/2022     No results found for: [id number]  No results found for: [id number], TSH  No results found for: [id number], [id number]   Results for orders placed during the hospital encounter of 02/21/17    CT head wo contrast    Narrative  CT BRAIN - WITHOUT CONTRAST    INDICATION:  Fall  Confusion  COMPARISON:  April 5, 2010    TECHNIQUE:  CT examination of the brain was performed  In addition to axial images, coronal reformatted images were created and submitted for interpretation  This examination, like all CT scans performed in the [facility], was  performed utilizing techniques to minimize radiation dose exposure, including the use of iterative reconstruction and automated exposure control  IMAGE QUALITY:  Diagnostic  FINDINGS:    PARENCHYMA:  Decreased attenuation is noted in the supratentorial white matter demonstrating an appearance most consistent with moderate microangiopathic change  Chronic lacunar infarctions are noted in the basal ganglia, larger on the left than the  right  Small chronic infarction noted in the medial right occipital lobe, not present on April 5, 2010 examination  Atherosclerotic carotid and vertebral artery calcifications are noted  No intracranial mass, mass effect or midline shift  No acute intracranial hemorrhage  No CT signs of acute infarction  VENTRICLES AND EXTRA-AXIAL SPACES:  Age-appropriate volume loss is noted  No hydrocephalus  VISUALIZED ORBITS AND PARANASAL SINUSES:  Unremarkable      CALVARIUM AND EXTRACRANIAL SOFT TISSUES:   Normal     Impression  No acute intracranial abnormality  Chronic small large vessel ischemic changes as described        Workstation performed: [id number]      Selena Thorne, [address] [facility]  12/15/2022

## 2023-01-13 ENCOUNTER — NURSING HOME VISIT (OUTPATIENT)
Dept: GERIATRICS | Facility: OTHER | Age: 88
End: 2023-01-13

## 2023-01-13 DIAGNOSIS — R60.0 BILATERAL LOWER EXTREMITY EDEMA: Primary | ICD-10-CM

## 2023-01-22 PROBLEM — R60.0 BILATERAL LOWER EXTREMITY EDEMA: Status: ACTIVE | Noted: 2023-01-13

## 2023-01-23 NOTE — ASSESSMENT & PLAN NOTE
[id number]    Rec'd pt resting in bed awake alert and oriented x 4. VSS pt incont of loose brown stool, large amount. Replaced mepilex to reddend sacrum, also applied zinc barrier orintment. Rec'd tylenol 650 mg PO without difficulty Linen replaced. Purewick replaced. Output 150 from 7PM.    2200  Was updated that pt has room available on [facility]. Completed pts PM meds/blood sugar. Asked pt would she like to for me to call a family member to update on the room/transfer. Pt declined. Report called to [employer]. · Mild edema of bilateral lower extremities  · Monitor for worsening edema, redness or warmth  · Patient currently declining compression stockings  · Continue to encourage leg elevation

## 2023-01-23 NOTE — PROGRESS NOTES
[address]  [address] [facility]  [phone] [address]   Acute Visit Note  POS 32      NAME: Babatunde Xiong  AGE: 80 y o  SEX: female  :  1918  DATE OF ENCOUNTER: 2023    Chief Complaint   Patient seen and examined for increased right lower extremity edema    History of Present Illness     8 year old female patient seen and examined today per nursing request for increased right lateral edema of her lower extremity  On examination, patient is sitting in her chair, calm, cooperative and in no acute distress  She denies chest pain, sob, fever, chills, abdominal pain, myalgia, arthralgia  The following portions of the patient's history were reviewed and updated as appropriate: allergies, current medications, past family history, past medical history, past social history, past surgical history and problem list     Review of Systems     A review of systems was performed  All negative, except as per HPI  History     Past Medical History:   Diagnosis Date   • [facility] mellitus ([facility] )    • Hypertension    • Macular degeneration      No past surgical history on file  No family history on file  Social History     Socioeconomic History   • Marital status:       Spouse name: Not on file   • Number of children: Not on file   • Years of education: Not on file   • Highest education level: Not on file   Occupational History   • Not on file   Tobacco Use   • Smoking status: Never   • Smokeless tobacco: Never   Substance and Sexual Activity   • Alcohol use: No   • Drug use: No   • Sexual activity: Not on file   Other Topics Concern   • Not on file   Social History Narrative   • Not on file     Social Determinants of Health     Financial Resource Strain: Not on file   Food Insecurity: No Food Insecurity   • Worried About Running Out of Food in the Last Year: Never true   • Ran Out of Food in the Last Year: Never true   Transportation Needs: No Transportation Needs   • Lack of Transportation (Medical): No   • Lack of Transportation (Non-Medical): No   Physical Activity: Not on file   Stress: Not on file   Social Connections: Not on file   Intimate Partner Violence: Not on file   Housing Stability: Unknown   • Unable to Pay for Housing in the Last Year: No   • Number of Places Lived in the Last Year: Not on file   • Unstable Housing in the Last Year: No     No Known Allergies    Objective     Vital Signs  BP: 148/68      HR: 86 T 97 7     RR: 18  O2Sat: 92% RA W: 140 5 on 01/05/2023  General: NAD, Well Nourished, Well Developed  Oral: Oropharynx Moist and Clear  Neck: Supple, +ROM  CV: S1, S2, normal rate, regular rhythm, pulses intact  Pulmonary: Lung sounds clear to air, no wheezing, rhonchi, rales  Extremities: Mild bilateral lower extremity  edema, +ROM, +Strength  Skin: Warm, Dry, no lesions, no rash, no erythema present, no ecchymosis present  Psych: Awake and alert no mood, no affect    Pertinent Laboratory/Diagnostic Studies:  Reviewed in nursing home EMR  Current Medications     Current Medications Reviewed and updated in Nursing Home EMR      Assessment and Plan     Bilateral lower extremity edema  · Mild edema of bilateral lower extremities  · Monitor for worsening edema, redness or warmth  · Patient currently declining compression stockings  · Continue to encourage leg elevation      Katey [address] [facility]   01/13/2023

## 2023-02-24 ENCOUNTER — NURSING HOME VISIT (OUTPATIENT)
Dept: GERIATRICS | Facility: OTHER | Age: 88
End: 2023-02-24

## 2023-02-24 DIAGNOSIS — D64.9 ANEMIA, UNSPECIFIED TYPE: ICD-10-CM

## 2023-02-24 DIAGNOSIS — I10 ESSENTIAL HYPERTENSION: ICD-10-CM

## 2023-02-24 DIAGNOSIS — E11.9 TYPE 2 DIABETES MELLITUS WITHOUT COMPLICATION, WITHOUT LONG-TERM CURRENT USE OF INSULIN (HCC): Primary | ICD-10-CM

## 2023-02-24 DIAGNOSIS — R53.81 DEBILITY: ICD-10-CM

## 2023-02-24 NOTE — PROGRESS NOTES
[address] [facility]  Progress Note  POS: Nursing Facility/LT-    Chief Complaint/Reason for visit: Follow up of chronic medical conditions  History of Present Illness: 8 year old female evaluated for routine follow up of chronic medical conditions  Lower extremity edema reported; no SOB or calf pain; no hypoxia  On both verapamil and amlodipine with may contribute to peripheral edema  Review of systems: Review of Systems   Respiratory: Negative for cough and shortness of breath  Cardiovascular: Positive for leg swelling  Medications: No changes made    Physical Exam    Weight: 146 2lb Temp:97 8F BP:155/74 Pulse:90 Resp:20 O2 Sat:96%RA  Physical Exam  Vitals and nursing note reviewed  Constitutional:       General: She is awake  She is not in acute distress  Appearance: She is well-developed and well-groomed  She is not toxic-appearing or diaphoretic  HENT:      Head: Normocephalic and atraumatic  Right Ear: External ear normal       Left Ear: External ear normal       Nose: No rhinorrhea  Mouth/Throat:      Mouth: Mucous membranes are moist    Eyes:      General:         Right eye: No discharge  Left eye: No discharge  Pulmonary:      Effort: Pulmonary effort is normal  No respiratory distress  Musculoskeletal:      Right lower leg: Edema present  Left lower leg: Edema present  Skin:     Coloration: Skin is not jaundiced or pale  Neurological:      Mental Status: She is alert  Mental status is at baseline  Cranial Nerves: No facial asymmetry  Psychiatric:         Behavior: Behavior is cooperative         Assessment/Plan:  8 year old female with:    Type 2 diabetes mellitus without complication, without long-term current use of insulin (HCC)  A1c 6 2 on 12/20/23  Goal A1c <8 5 given age and comorbidities  Metformin discontinued  Due for routine A1c and BMP approx June 2023    Essential hypertension  Goal <150/90; BP log reviewed and overall within acceptable range  Continue amlodipine, lisinopril, verapamil    Anemia  Hgb 10 8 on most recent labs; stable  Next due for routine CBC approx June 2023    Debility  Multifactorial  Continue LTC for 24/7 and ADL care  Supportive care, nutritional support  Fall precautions  Management of chronic medical conditions as outlined    Jose Adams DO  2/24/23

## 2023-03-02 NOTE — ASSESSMENT & PLAN NOTE
Goal <150/90; BP log reviewed and overall within acceptable range  Continue amlodipine, lisinopril, verapamil

## 2023-03-02 NOTE — ASSESSMENT & PLAN NOTE
A1c 6 2 on 12/20/23  Goal A1c <8 5 given age and comorbidities  Metformin discontinued  Due for routine A1c and BMP approx June 2023

## 2023-04-19 PROBLEM — R06.2 WHEEZING: Status: ACTIVE | Noted: 2023-04-19

## 2023-05-07 NOTE — TELEPHONE ENCOUNTER
Refill Zerapamil 180 mg 1 daily  [facility]  Please bring a flu shot when you go out for home visit 
Sent to Dr Paula Palacio
No/Not applicable

## 2023-05-10 ENCOUNTER — NURSING HOME VISIT (OUTPATIENT)
Dept: GERIATRICS | Facility: OTHER | Age: 88
End: 2023-05-10

## 2023-05-10 VITALS
SYSTOLIC BLOOD PRESSURE: 166 MMHG | HEART RATE: 93 BPM | RESPIRATION RATE: 22 BRPM | DIASTOLIC BLOOD PRESSURE: 82 MMHG | WEIGHT: 152.4 LBS | BODY MASS INDEX: 28.8 KG/M2 | OXYGEN SATURATION: 96 % | TEMPERATURE: 97 F

## 2023-05-10 DIAGNOSIS — R60.0 BILATERAL LOWER EXTREMITY EDEMA: Primary | ICD-10-CM

## 2023-05-10 DIAGNOSIS — I48.0 PAROXYSMAL ATRIAL FIBRILLATION (HCC): ICD-10-CM

## 2023-05-10 DIAGNOSIS — I10 ESSENTIAL HYPERTENSION: ICD-10-CM

## 2023-05-10 RX ORDER — METOPROLOL SUCCINATE 25 MG/1
25 TABLET, EXTENDED RELEASE ORAL DAILY
Start: 2023-05-10 | End: 2023-05-17

## 2023-05-10 NOTE — ASSESSMENT & PLAN NOTE
Currently rate controlled on verapamil 180 mg daily, d/t edema will discontinue and start metoprolol 25 mg daily for rate control

## 2023-05-10 NOTE — ASSESSMENT & PLAN NOTE
+2 pitting edema to bilateral lower extremities  7 lb weight gain within one month   Will discontinue verapamil and amlodipine d/t peripheral edema   Encourage elevation of lower extremities and compression stockings   Monitor weights weekly for on month and then return to monthly weights per facility protocol

## 2023-05-10 NOTE — ASSESSMENT & PLAN NOTE
On review of BP log, BP has been consistently elevated, currently 166/82 today   Goal BP <140/90  Currently on max dose of lisinopril   Discontinue CCBs d/t edema  Will start Metoprolol 25 mg daily   Continue to monitor BP daily for 1 week and reassess effectiveness at that time

## 2023-05-10 NOTE — PROGRESS NOTES
[facility]  [address] , [address] [facility]  LTC: POS 32  Acute Visit    NAME: Ford Le  AGE: 80 y o  SEX: female  : 1918     DATE: 5/10/2023    CODE STATUS: DNR     Assessment and Plan:     Problem List Items Addressed This Visit        Cardiovascular and Mediastinum    Essential hypertension     On review of BP log, BP has been consistently elevated, currently 166/82 today   Goal BP <140/90  Currently on max dose of lisinopril   Discontinue CCBs d/t edema  Will start Metoprolol 25 mg daily   Continue to monitor BP daily for 1 week and reassess effectiveness at that time          Relevant Medications    metoprolol succinate (TOPROL-XL) 25 mg 24 hr tablet    Paroxysmal atrial fibrillation (HCC)     Currently rate controlled on verapamil 180 mg daily, d/t edema will discontinue and start metoprolol 25 mg daily for rate control           Relevant Medications    metoprolol succinate (TOPROL-XL) 25 mg 24 hr tablet       Other    Bilateral lower extremity edema - Primary     +2 pitting edema to bilateral lower extremities  7 lb weight gain within one month   Will discontinue verapamil and amlodipine d/t peripheral edema   Encourage elevation of lower extremities and compression stockings   Monitor weights weekly for on month and then return to monthly weights per facility protocol                History of Present Illness:     Aarti Mckeon is a 80 yr old female being seen at her LTC facility for acute weight gain and leg edema  Per nursing staff she has had a 7 lb weight gain in one month  During exam she does have some pitting edema to her bilateral lower legs  Denies SOB or CP  Lungs are clear on auscultation  Pertinent medications include amlodipine and verapamil for hypertension and atrial fibrillation         The following portions of the patient's history were reviewed and updated as appropriate: allergies, current medications, past family history, past medical history, past social history, past surgical history and problem list      Review of Systems:     Review of Systems   Constitutional: Negative for chills and fever  HENT: Negative for ear pain and sore throat  Eyes: Negative for pain and visual disturbance  Respiratory: Negative for cough and shortness of breath  Cardiovascular: Negative for chest pain and palpitations  Gastrointestinal: Negative for abdominal pain and vomiting  Genitourinary: Negative for dysuria and hematuria  Musculoskeletal: Negative for arthralgias and back pain  Skin: Negative for color change and rash  Neurological: Negative for seizures and syncope  All other systems reviewed and are negative  Problem List:     Patient Active Problem List   Diagnosis   • Essential hypertension   • Primary osteoarthritis of left hip   • Ambulatory dysfunction   • Type 2 diabetes mellitus without complication, without long-term current use of insulin ([facility])   • ARMD (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 degeneration)   • Left sided lacunar infarction [facility])   • Primary osteoarthritis of right knee   • Paroxysmal atrial fibrillation ([facility])   • Anemia   • Debility   • Lesion of canthus of left eye   • Bilateral lower extremity edema   • Wheezing        Objective:     /82   Pulse 93   Temp (!) 97 °F (36 1 °C)   Resp 22   Wt 69 1 kg (152 lb 6 4 oz)   LMP  (LMP Unknown)   SpO2 96%   BMI 28 80 kg/m²     Physical Exam  Vitals and nursing note reviewed  Constitutional:       General: She is not in acute distress  Appearance: She is well-developed  HENT:      Head: Normocephalic and atraumatic  Eyes:      Conjunctiva/sclera: Conjunctivae normal    Cardiovascular:      Rate and Rhythm: Normal rate and regular rhythm  Heart sounds: No murmur heard  Pulmonary:      Effort: Pulmonary effort is normal  No respiratory distress  Breath sounds: Normal breath sounds  Abdominal:      Palpations: Abdomen is soft  Tenderness: There is no abdominal tenderness  Musculoskeletal:         General: No swelling  Cervical back: Neck supple  Right lower le+ Pitting Edema present  Left lower le+ Pitting Edema present  Skin:     General: Skin is warm and dry  Capillary Refill: Capillary refill takes less than 2 seconds  Neurological:      Mental Status: She is alert  Psychiatric:         Mood and Affect: Mood normal          Pertinent Laboratory/Diagnostic Studies:    Laboratory Results: I have personally reviewed the pertinent laboratory results/reports     Radiology/Other Diagnostic Testing Results: I have personally reviewed pertinent reports        Constantin Erazo, 10 [facility]  Geriatric Medicine

## 2023-05-17 ENCOUNTER — NURSING HOME VISIT (OUTPATIENT)
Dept: GERIATRICS | Facility: OTHER | Age: 88
End: 2023-05-17

## 2023-05-17 VITALS
HEART RATE: 80 BPM | OXYGEN SATURATION: 96 % | DIASTOLIC BLOOD PRESSURE: 90 MMHG | SYSTOLIC BLOOD PRESSURE: 168 MMHG | TEMPERATURE: 98.4 F

## 2023-05-17 DIAGNOSIS — I10 ESSENTIAL HYPERTENSION: Primary | ICD-10-CM

## 2023-05-17 DIAGNOSIS — R60.0 BILATERAL LOWER EXTREMITY EDEMA: ICD-10-CM

## 2023-05-17 RX ORDER — METOPROLOL SUCCINATE 25 MG/1
50 TABLET, EXTENDED RELEASE ORAL DAILY
Start: 2023-05-17

## 2023-05-17 NOTE — ASSESSMENT & PLAN NOTE
Continues to have +2 pitting edema  CCB's recently discontinued   Last weight 151 6 lbs, 1 lb weight loss    Consider addition of HCTZ or lasix if BP goal not met and patient continues to have edema   Continue to elevate lower extremities   Weekly weights

## 2023-05-17 NOTE — PROGRESS NOTES
[facility]  [address] [facility] , [address]  POS 32  Acute Visit    NAME: Thad Mireles  AGE: 80 y o  SEX: female  : 1918     DATE: 2023    CODE STATUS: DNR     Assessment and Plan:     Problem List Items Addressed This Visit        Cardiovascular and Mediastinum    Essential hypertension - Primary     BP remains elevated 168/90  Goal <140/90  Currently on max dose of lisinopril  CCBs d/c'd d/t edema, metoprolol started on 5/10  Will increase metoprolol from 25 mg to 50 mg daily  HR trending 60's-80's   Hold metoprolol for HR <60  Monitor BP and HR daily x 1 week          Relevant Medications    metoprolol succinate (TOPROL-XL) 25 mg 24 hr tablet       Other    Bilateral lower extremity edema     Continues to have +2 pitting edema  CCB's recently discontinued   Last weight 151 6 lbs, 1 lb weight loss    Consider addition of HCTZ or lasix if BP goal not met and patient continues to have edema   Continue to elevate lower extremities   Weekly weights                History of Present Illness:     Patient is a 80 y o  old female being seen at [address] [place] [address] for nursing concerns of elevated blood pressure  Ian Owens was seen last week for edema of bilateral lower extremities and was noted to have elevated blood pressure at that time as well  During previous visit calcium channel blockers were discontinued d/t concern of peripheral edema and metoprolol was initiated  At present patient denies headaches, double vision, nausea, or CP  She does continue to have edema to bilateral lower extremities but has improved slightly  The following portions of the patient's history were reviewed and updated as appropriate: allergies, current medications, past family history, past medical history, past social history, past surgical history and problem list      Review of Systems:     Review of Systems   Constitutional: Negative for chills and fever     HENT: Negative for ear pain and sore throat  Eyes: Negative for pain and visual disturbance  Respiratory: Negative for cough and shortness of breath  Cardiovascular: Negative for chest pain and palpitations  Gastrointestinal: Negative for abdominal pain and vomiting  Genitourinary: Negative for dysuria and hematuria  Musculoskeletal: Negative for arthralgias and back pain  Skin: Negative for color change and rash  Neurological: Negative for seizures and syncope  All other systems reviewed and are negative  Problem List:     Patient Active Problem List   Diagnosis   • Essential hypertension   • Primary osteoarthritis of left hip   • Ambulatory dysfunction   • Type 2 diabetes mellitus without complication, without long-term current use of insulin ([facility])   • ARMD (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 degeneration)   • Left sided lacunar infarction [facility])   • Primary osteoarthritis of right knee   • Paroxysmal atrial fibrillation ([facility])   • Anemia   • Debility   • Lesion of canthus of left eye   • Bilateral lower extremity edema   • Wheezing        Objective:     /90   Pulse 80   Temp 98 4 °F (36 9 °C)   LMP  (LMP Unknown)   SpO2 96%     Physical Exam  Vitals and nursing note reviewed  Constitutional:       General: She is not in acute distress  Appearance: She is well-developed  HENT:      Head: Normocephalic and atraumatic  Eyes:      Conjunctiva/sclera: Conjunctivae normal    Cardiovascular:      Rate and Rhythm: Normal rate and regular rhythm  Heart sounds: No murmur heard  Pulmonary:      Effort: Pulmonary effort is normal  No respiratory distress  Breath sounds: Normal breath sounds  Abdominal:      Palpations: Abdomen is soft  Tenderness: There is no abdominal tenderness  Musculoskeletal:      Cervical back: Neck supple  Right lower leg: Edema present  Left lower leg: Edema present  Skin:     General: Skin is warm and dry        Capillary Refill: Capillary refill takes less than 2 seconds  Neurological:      General: No focal deficit present  Mental Status: She is alert and oriented to person, place, and time  Psychiatric:         Mood and Affect: Mood normal          Behavior: Behavior normal          Pertinent Laboratory/Diagnostic Studies:    Laboratory Results: I have personally reviewed the pertinent laboratory results/reports     Radiology/Other Diagnostic Testing Results: I have personally reviewed pertinent reports        Sherri Briggs, 10 [facility]

## 2023-05-17 NOTE — ASSESSMENT & PLAN NOTE
BP remains elevated 168/90  Goal <140/90  Currently on max dose of lisinopril  CCBs d/c'd d/t edema, metoprolol started on 5/10  Will increase metoprolol from 25 mg to 50 mg daily  HR trending 60's-80's   Hold metoprolol for HR <60  Monitor BP and HR daily x 1 week

## 2023-05-24 ENCOUNTER — NURSING HOME VISIT (OUTPATIENT)
Dept: GERIATRICS | Facility: OTHER | Age: 88
End: 2023-05-24
Payer: MEDICARE

## 2023-05-24 DIAGNOSIS — R60.0 BILATERAL LOWER EXTREMITY EDEMA: ICD-10-CM

## 2023-05-24 DIAGNOSIS — I10 RESISTANT HYPERTENSION: Primary | ICD-10-CM

## 2023-05-24 PROCEDURE — 99309 SBSQ NF CARE MODERATE MDM 30: CPT | Performed by: NURSE PRACTITIONER

## 2023-05-24 NOTE — PROGRESS NOTES
[address]  [phone]) [address]   Acute Visit Note  LTC: POS 32       NAME: Vic Shen  AGE: 80 y o  SEX: female  :  1918  DATE OF ENCOUNTER: 2023    Chief Complaint   Patient seen and examined for elevated blood pressure    History of Present Illness     8 year old female patient seen and examined today for elevated blood pressure, SBP >180  Currently bp is reported to be 200/100 with no reported signs or symptoms  Upon examination, patient is awake, alert and in no acute distress  She denies chest pain, sob, abdominal pain, n/v/d, headache, dizziness or visual disturbance  The following portions of the patient's history were reviewed and updated as appropriate: allergies, current medications, past family history, past medical history, past social history, past surgical history and problem list     Review of Systems     A review of systems was performed  All negative, except as per HPI  History     Past Medical History:   Diagnosis Date   • [facility] )    • Hypertension    • Macular degeneration      No past surgical history on file  No family history on file  Social History     Socioeconomic History   • Marital status:       Spouse name: Not on file   • Number of children: Not on file   • Years of education: Not on file   • Highest education level: Not on file   Occupational History   • Not on file   Tobacco Use   • Smoking status: Never   • Smokeless tobacco: Never   Substance and Sexual Activity   • Alcohol use: No   • Drug use: No   • Sexual activity: Not on file   Other Topics Concern   • Not on file   Social History Narrative   • Not on file     Social Determinants of Health     Financial Resource Strain: Not on file   Food Insecurity: Not on file   Transportation Needs: Not on file   Physical Activity: Not on file   Stress: Not on file   Social Connections: Not on file   Intimate Partner Violence: Not on file   Housing Stability: Not on file     No Known Allergies    Objective     Vital Signs  BP:      200/100 HR: 56  T: 98 5    RR: 20  O2Sat: 92% RA W: 146 2   General: NAD, Well Nourished, Well Developed  Oral: Oropharynx Moist and Clear  Neck: Supple, +ROM  CV: S1, S2, normal rate, regular rhythm, no murmur appreciated  Pulmonary: Lung sounds clear to air, no wheezing, rhonchi, rales  Abdominal:BS + x4 in all quadrants, soft, no mass, no tenderness  Extremities: Mild bilateral lower extremity edema, +ROM, +Strength  Skin: Warm, Dry  Neurological: No cranial nerve deficits   Psych: Awake and alert, no mood, no affect    Pertinent Laboratory/Diagnostic Studies:  Reviewed in [facility] care facility EMR  Current Medications     Current Medications Reviewed and updated in Nursing Home EMR  Assessment and Plan     Resistant hypertension  · BP elevation for the past week not responding to the increase in metoprolol  · BP currently 200/100 despite being given stat dose of Hydralazine  · Will give stat dose of Furosemide 20 mg and continue Furosemide 10 mg po daily  · Hydralazine 25 mg po tid prn sbp > 170  · Continue to monitor vital signs closely; avoid hypotension  · Blood work ordered to monitor electrolytes and renal function    Bilateral lower extremity edema  · Improved, however, mild edema present   · Furosemide 10 mg po daily ordered     · Continue lower extremity elevation      Katey [address] [place] [address]  05/24/2023

## 2023-06-04 PROBLEM — I1A.0 RESISTANT HYPERTENSION: Status: ACTIVE | Noted: 2019-10-22

## 2023-06-04 RX ORDER — FUROSEMIDE 20 MG/1
10 TABLET ORAL DAILY
COMMUNITY

## 2023-06-04 RX ORDER — HYDRALAZINE HYDROCHLORIDE 25 MG/1
25 TABLET, FILM COATED ORAL 3 TIMES DAILY PRN
COMMUNITY

## 2023-06-04 NOTE — ASSESSMENT & PLAN NOTE
· BP elevation for the past week not responding to the increase in metoprolol  · BP currently 200/100 despite being given stat dose of Hydralazine  · Will give stat dose of Furosemide 20 mg and continue Furosemide 10 mg po daily     · Hydralazine 25 mg po tid prn sbp > 170  · Continue to monitor vital signs closely; avoid hypotension  · Blood work ordered to monitor electrolytes and renal function

## 2023-06-04 NOTE — ASSESSMENT & PLAN NOTE
· Improved, however, mild edema present   · Furosemide 10 mg po daily ordered     · Continue lower extremity elevation

## 2023-06-12 ENCOUNTER — NURSING HOME VISIT (OUTPATIENT)
Dept: GERIATRICS | Facility: OTHER | Age: 88
End: 2023-06-12
Payer: MEDICARE

## 2023-06-12 DIAGNOSIS — N94.9 VAGINAL BURNING: Primary | ICD-10-CM

## 2023-06-12 PROCEDURE — 99308 SBSQ NF CARE LOW MDM 20: CPT | Performed by: NURSE PRACTITIONER

## 2023-06-12 NOTE — PROGRESS NOTES
[address]  [address] [facility]  [phone]  [address]   Acute Visit Note  POS 32      NAME: Ruben Garcia  AGE: 80 y o  SEX: female  :  1918  DATE OF ENCOUNTER: 2023    Chief Complaint   Patient seen and examined for vaginal burning at     History of Present Illness     8 year old female patient seen and examined today per nursing request for complaint of vaginal burning at HS  Patient states that she thinks the burning is due to a medication that she is taking in the evening  This sensation started several days ago  She is also complaining of dysuria  No fever, chills, chest pain, abdominal pain, nausea, vomiting reported  The following portions of the patient's history were reviewed and updated as appropriate: allergies, current medications, past family history, past medical history, past social history, past surgical history and problem list     Review of Systems     A review of systems was performed  All negative, except as per HPI  History     Past Medical History:   Diagnosis Date   • [facility]    • Hypertension    • Macular degeneration      No past surgical history on file  No family history on file  Social History     Socioeconomic History   • Marital status:       Spouse name: Not on file   • Number of children: Not on file   • Years of education: Not on file   • Highest education level: Not on file   Occupational History   • Not on file   Tobacco Use   • Smoking status: Never   • Smokeless tobacco: Never   Substance and Sexual Activity   • Alcohol use: No   • Drug use: No   • Sexual activity: Not on file   Other Topics Concern   • Not on file   Social History Narrative   • Not on file     Social Determinants of Health     Financial Resource Strain: Not on file   Food Insecurity: No Food Insecurity (2022)    Hunger Vital Sign    • Worried About Running Out of Food in the Last Year: Never true    • Ran Out of Food in the Last Year: Never true   Transportation Needs: No Transportation Needs (4/22/2022)    PRAPARE - Transportation    • Lack of Transportation (Medical): No    • Lack of Transportation (Non-Medical): No   Physical Activity: Not on file   Stress: Not on file   Social Connections: Not on file   Intimate Partner Violence: Not on file   Housing Stability: Unknown (4/22/2022)    Housing Stability Vital Sign    • Unable to Pay for Housing in the Last Year: No    • Number of Places Lived in the Last Year: Not on file    • Unstable Housing in the Last Year: No     No Known Allergies    Objective     Vital Signs  General: NAD, Well Nourished, Well Developed  CV: S1, S2, normal rate, regular rhythm  Pulmonary: Lung sounds clear to air, no wheezing, rhonchi, rales  Abdominal:BS + x4 in all quadrants, soft, no mass, no tenderness  Skin: Warm, Dry  Psych: Awake and alert, no mood, no affect, good judgement    Pertinent Laboratory/Diagnostic Studies:  Reviewed in nursing home EMR  Current Medications     Current Medications Reviewed and updated in Nursing Home EMR  Assessment and Plan     Vaginal burning  · Will place on watchful waiting UTI protocol  · The only evening medication is lisinopril  · Will order lisinopril 40 mg po daily in the am  · Monitor vital signs    · Encourage adequate hydration      [address] [facility]  Geriatric Medicine  06/12/2023

## 2023-06-16 ENCOUNTER — NURSING HOME VISIT (OUTPATIENT)
Dept: GERIATRICS | Facility: OTHER | Age: 88
End: 2023-06-16
Payer: MEDICARE

## 2023-06-16 DIAGNOSIS — I10 RESISTANT HYPERTENSION: Primary | ICD-10-CM

## 2023-06-16 DIAGNOSIS — R05.1 ACUTE COUGH: ICD-10-CM

## 2023-06-16 DIAGNOSIS — I48.0 PAROXYSMAL ATRIAL FIBRILLATION (HCC): ICD-10-CM

## 2023-06-16 DIAGNOSIS — R53.81 DEBILITY: ICD-10-CM

## 2023-06-16 PROCEDURE — 99309 SBSQ NF CARE MODERATE MDM 30: CPT

## 2023-06-17 ENCOUNTER — TELEPHONE (OUTPATIENT)
Dept: OTHER | Facility: OTHER | Age: 88
End: 2023-06-17

## 2023-06-20 VITALS — SYSTOLIC BLOOD PRESSURE: 152 MMHG | TEMPERATURE: 97.3 F | DIASTOLIC BLOOD PRESSURE: 68 MMHG | HEART RATE: 60 BPM

## 2023-06-20 PROBLEM — R05.9 COUGH: Status: ACTIVE | Noted: 2023-06-20

## 2023-06-20 RX ORDER — GUAIFENESIN 200 MG/10ML
200 LIQUID ORAL 2 TIMES DAILY PRN
Qty: 120 ML | Refills: 0
Start: 2023-06-20

## 2023-06-20 NOTE — PROGRESS NOTES
[address]  8225 [facility] , [address] [place] [address] [place]  POS 32  LTC Follow-up    NAME: Trace Rodríguez  AGE: 80 y o  SEX: female  : 1918     DATE: 2023    CODE STATUS: DNR     Assessment and Plan:     Problem List Items Addressed This Visit        Cardiovascular and Mediastinum    Resistant hypertension - Primary     Blood pressures remain high SBP trending 150s to 160s  Goal blood pressure less than 150/90  Allow for higher blood pressures due to advanced age  Blood pressures have improved from 200s over 100s  Continue Lasix 10 mg daily hydralazine 25 mg 3 times daily as needed lisinopril 40 mg daily and metoprolol 50 mg daily  Continue to monitor blood pressure  Avoid hypotension         Paroxysmal atrial fibrillation (HCC)     Heart rate stable  Continue metoprolol for rate control  No current anticoagulation medications            Other    Debility     Multifactorial in the setting of advanced age and chronic comorbidities  Continues to require 24/7 care and support at [address] [place] [address]  Continue supportive care  Continue nutritional support  Fall and safety precautions  Management of acute and chronic medical conditions         Cough     Patient reporting increased phlegm and cough   Will start elaine-tussin liquid 10ml q12h prn   Monitor for worsening symptoms  Lungs clear on exam          Relevant Medications    guaiFENesin (ROBITUSSIN) 100 MG/5ML oral liquid          History of Present Illness:     Patient is 8-year-old female being seen for follow-up of acute and chronic medical conditions at long-term care facility  She was doing well at [facility] facility  No complaints of pain today  Patient does report increased phlegm  No acute distress no shortness of breath  Patient is alert and oriented at time of exam she is sitting outside with her family  No other concerns at this time        The following portions of the patient's history were reviewed and updated as appropriate: allergies, current medications, past family history, past medical history, past social history, past surgical history and problem list      Review of Systems:     Review of Systems   Constitutional: Negative for chills and fever  HENT: Negative for ear pain and sore throat  Eyes: Negative for pain and visual disturbance  Respiratory: Positive for cough  Negative for shortness of breath  Cardiovascular: Negative for chest pain, palpitations and leg swelling  Gastrointestinal: Negative for abdominal pain and vomiting  Genitourinary: Negative for dysuria and hematuria  Musculoskeletal: Negative for arthralgias and back pain  Skin: Negative for color change and rash  Neurological: Negative for dizziness, seizures, syncope and headaches  All other systems reviewed and are negative  Problem List:     Patient Active Problem List   Diagnosis   • Resistant hypertension   • Primary osteoarthritis of left hip   • Ambulatory dysfunction   • Type 2 diabetes mellitus without complication, without long-term current use of insulin ([facility])   • ARMD (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 degeneration)   • Left sided lacunar infarction [facility])   • Primary osteoarthritis of right knee   • Paroxysmal atrial fibrillation ([facility])   • Anemia   • Debility   • Lesion of canthus of left eye   • Bilateral lower extremity edema   • Wheezing   • Cough        Objective:     /68   Pulse 60   Temp (!) 97 3 °F (36 3 °C)   LMP  (LMP Unknown)     Physical Exam  Vitals and nursing note reviewed  Constitutional:       General: She is not in acute distress  Appearance: She is well-developed  HENT:      Head: Normocephalic and atraumatic  Eyes:      Conjunctiva/sclera: Conjunctivae normal    Cardiovascular:      Rate and Rhythm: Normal rate and regular rhythm  Heart sounds: Normal heart sounds  No murmur heard  Pulmonary:      Effort: Pulmonary effort is normal  No respiratory distress  Breath sounds: Normal breath sounds  Abdominal:      Palpations: Abdomen is soft  Tenderness: There is no abdominal tenderness  Musculoskeletal:         General: No swelling  Cervical back: Neck supple  Skin:     General: Skin is warm and dry  Capillary Refill: Capillary refill takes less than 2 seconds  Neurological:      General: No focal deficit present  Mental Status: She is alert and oriented to person, place, and time  Psychiatric:         Mood and Affect: Mood normal          Behavior: Behavior normal          Pertinent Laboratory/Diagnostic Studies:    Laboratory Results: I have personally reviewed the pertinent laboratory results/reports     Radiology/Other Diagnostic Testing Results: I have personally reviewed pertinent reports        Jeri Melendrez, [facility]  [facility]iatric [facility]

## 2023-06-20 NOTE — ASSESSMENT & PLAN NOTE
Patient reporting increased phlegm and cough   Will start elaine-tussin liquid 10ml q12h prn   Monitor for worsening symptoms  Lungs clear on exam

## 2023-06-20 NOTE — ASSESSMENT & PLAN NOTE
Blood pressures remain high SBP trending 150s to 160s  Goal blood pressure less than 150/90  Allow for higher blood pressures due to advanced age  Blood pressures have improved from 200s over 100s  Continue Lasix 10 mg daily hydralazine 25 mg 3 times daily as needed lisinopril 40 mg daily and metoprolol 50 mg daily  Continue to monitor blood pressure  Avoid hypotension

## 2023-06-20 NOTE — ASSESSMENT & PLAN NOTE
Multifactorial in the setting of advanced age and chronic comorbidities  Continues to require 24/7 care and support at [address] [place] [address]  Continue supportive care  Continue nutritional support  Fall and safety precautions  Management of acute and chronic medical conditions

## 2023-06-25 PROBLEM — N94.9 VAGINAL BURNING: Status: ACTIVE | Noted: 2023-06-12

## 2023-06-26 ENCOUNTER — NURSING HOME VISIT (OUTPATIENT)
Dept: GERIATRICS | Facility: OTHER | Age: 88
End: 2023-06-26
Payer: MEDICARE

## 2023-06-26 DIAGNOSIS — H02.9: ICD-10-CM

## 2023-06-26 DIAGNOSIS — I10 RESISTANT HYPERTENSION: ICD-10-CM

## 2023-06-26 DIAGNOSIS — R06.09 DYSPNEA ON EXERTION: Primary | ICD-10-CM

## 2023-06-26 PROCEDURE — 99309 SBSQ NF CARE MODERATE MDM 30: CPT | Performed by: NURSE PRACTITIONER

## 2023-06-26 RX ORDER — AMLODIPINE BESYLATE 5 MG/1
5 TABLET ORAL DAILY
COMMUNITY

## 2023-06-26 RX ORDER — IPRATROPIUM BROMIDE AND ALBUTEROL SULFATE 2.5; .5 MG/3ML; MG/3ML
3 SOLUTION RESPIRATORY (INHALATION) 4 TIMES DAILY
COMMUNITY

## 2023-06-26 RX ORDER — ERYTHROMYCIN 5 MG/G
0.5 OINTMENT OPHTHALMIC EVERY 12 HOURS SCHEDULED
COMMUNITY
Start: 2023-06-26 | End: 2023-07-03

## 2023-06-26 NOTE — ASSESSMENT & PLAN NOTE
· Patient with cough congestion for the past week  · Chest x-ray reviewed on 06/19; shows no active disease in the chest  Exam remains stable since prior study  · Will increase neb treatments from prn to qid  · Will increase Furosemide to 20 mg po daily  · BMP, CBC with diff for the am  · Monitor frequent vital signs

## 2023-06-26 NOTE — ASSESSMENT & PLAN NOTE
· BP improved with increase in metoprolol and  addition of furosemide and amlodipine  · Due to increased wheezing, will increase  Furosemide to  20 mg po daily     · Continue Hydralazine 25 mg po tid prn sbp > 170  · Continue to monitor vital signs closely; avoid hypotension  · Blood work ordered to monitor electrolytes and renal function

## 2023-06-26 NOTE — PROGRESS NOTES
[address] [facility] 7342, [address] [facility]  [phone]    Acute Visit Note  LTC: POS 32    ASSESSMENT AND PLAN:  1  Dyspnea on exertion  Assessment & Plan:  · Patient with cough congestion for the past week  · Chest x-ray reviewed on ; shows no active disease in the chest  Exam remains stable since prior study  · Will increase neb treatments from prn to qid  · Will increase Furosemide to 20 mg po daily  · BMP, CBC with diff for the am  · Monitor frequent vital signs  2  Lesion of canthus of left eye  Assessment & Plan:  · Chronic, with mild eye infection starting  · Will order Erythromycin eye ointment bid for 7 days  · Continue warm compresses      3  Resistant hypertension  Assessment & Plan:  · BP improved with increase in metoprolol and  addition of furosemide and amlodipine  · Due to increased wheezing, will increase  Furosemide to  20 mg po daily  · Continue Hydralazine 25 mg po tid prn sbp > 170  · Continue to monitor vital signs closely; avoid hypotension  · Blood work ordered to monitor electrolytes and renal function          Name:  Rosa Side    :  1918     Sex: Female     HPI:    8 year old female patient seen and examined today per nursing request for increased sob with exertion and left eye irritation  Upon examination, patient is sitting in her chair  She is calm, cooperative and in no acute distress  She states that she feels sob when she tries to cough  She denies chest pain, fever, chills, headache, dizziness  The following portions of the patient's history were reviewed and updated as appropriate: allergies, current medications, past family history, past medical history, past social history, past surgical history and problem list     ROS: Review of Systems   Constitutional: Negative for chills, diaphoresis and fever  Eyes: Positive for discharge and redness  Negative for photophobia and pain  Respiratory: Positive for cough, shortness of breath and wheezing  Negative for chest tightness  Cardiovascular: Positive for leg swelling  Negative for chest pain and palpitations  Gastrointestinal: Negative for abdominal pain, nausea and vomiting  Neurological: Negative for dizziness, weakness and light-headedness  No Known Allergies    Medications:    Current Outpatient Medications on File Prior to Visit   Medication Sig Dispense Refill   • amLODIPine (NORVASC) 5 mg tablet Take 5 mg by mouth daily     • erythromycin (ILOTYCIN) ophthalmic ointment 0 5 inches every 12 (twelve) hours     • ipratropium-albuterol (DUO-NEB) 0 5-2 5 mg/3 mL nebulizer solution Take 3 mL by nebulization 4 (four) times a day     • furosemide (LASIX) 20 mg tablet Take 20 mg by mouth daily     • guaiFENesin (ROBITUSSIN) 100 MG/5ML oral liquid Take 10 mL (200 mg total) by mouth 2 (two) times a day as needed for cough 120 mL 0   • hydrALAZINE (APRESOLINE) 25 mg tablet Take 25 mg by mouth 3 (three) times a day as needed     • lisinopril (ZESTRIL) 20 mg tablet TAKE 1 TABLET(20 MG) BY MOUTH TWICE DAILY (Patient taking differently: Take 40 mg by mouth daily) 180 tablet 3   • metoprolol succinate (TOPROL-XL) 25 mg 24 hr tablet Take 2 tablets (50 mg total) by mouth daily       No current facility-administered medications on file prior to visit  History:  Past Medical History:   Diagnosis Date   • Diabetes mellitus ([facility] [address] )    • Hypertension    • Macular degeneration      No past surgical history on file  No family history on file  Social History     Socioeconomic History   • Marital status:       Spouse name: Not on file   • Number of children: Not on file   • Years of education: Not on file   • Highest education level: Not on file   Occupational History   • Not on file   Tobacco Use   • Smoking status: Never   • Smokeless tobacco: Never   Substance and Sexual Activity   • Alcohol use: No   • Drug use: No   • Sexual activity: Not on file   Other Topics Concern   • Not on file   Social History Narrative   • Not on file     Social Determinants of Health     Financial Resource Strain: Not on file   Food Insecurity: No Food Insecurity (4/22/2022)    Hunger Vital Sign    • Worried About Running Out of Food in the Last Year: Never true    • Ran Out of Food in the Last Year: Never true   Transportation Needs: No Transportation Needs (4/22/2022)    PRAPARE - Transportation    • Lack of Transportation (Medical): No    • Lack of Transportation (Non-Medical): No   Physical Activity: Not on file   Stress: Not on file   Social Connections: Not on file   Intimate Partner Violence: Not on file   Housing Stability: Unknown (4/22/2022)    Housing Stability Vital Sign    • Unable to Pay for Housing in the Last Year: No    • Number of Places Lived in the Last Year: Not on file    • Unstable Housing in the Last Year: No     No past surgical history on file  OBJECTIVE:  Vital Signs:  /70, HR 91, Temp 98 0, RR 20, Spo2 94% RA  Wgt 146 2 lbs     Physical Exam  Constitutional:       General: She is not in acute distress  Appearance: Normal appearance  She is not ill-appearing, toxic-appearing or diaphoretic  HENT:      Right Ear: External ear normal       Left Ear: External ear normal       Nose: Nose normal       Mouth/Throat:      Mouth: Mucous membranes are moist       Pharynx: Oropharynx is clear  Eyes:      General:         Right eye: No foreign body, discharge or hordeolum  Left eye: Discharge and hordeolum present  No foreign body  Conjunctiva/sclera:      Left eye: Left conjunctiva is injected  Cardiovascular:      Rate and Rhythm: Normal rate and regular rhythm  Pulses: Normal pulses  Heart sounds: Murmur heard  Pulmonary:      Effort: Pulmonary effort is normal  No respiratory distress  Breath sounds: Wheezing present  No rhonchi or rales     Abdominal:      General: Bowel sounds are normal  There is no distension  Palpations: Abdomen is soft  There is no mass  Tenderness: There is no abdominal tenderness  There is no guarding  Musculoskeletal:         General: No tenderness or signs of injury  Normal range of motion  Right lower leg: Edema present  Left lower leg: Edema present  Skin:     General: Skin is warm and dry  Neurological:      General: No focal deficit present  Mental Status: She is alert  Mental status is at baseline  Cranial Nerves: No cranial nerve deficit  Sensory: No sensory deficit  Motor: No weakness  Labs & Imagin2023  PROCEDURE: XRAY CHEST 2 VIEW STATUS: Final   INTERPRETATION:  Reason for Study: R05 9 COUGH, UNSPECIFIED  Principal Result : Contreras Day ([id number])  Technician: Adin Langford (Funmilayo Estrada)  Transcription Technician: DERICK    XRAY CHEST 2 VIEW Comparison: Comparison 2023  See Note  FINDINGS: The cardiac silhouette is enlarged, there is calcification of the aortic arch and mild uncoiling  There is no CHF or pneumonia, there are no infiltrates or effusions  The extrathoracic soft tissues are normal, there are degenerative changes of the shoulders  There is mild kyphosis of the thoracic spine  CONCLUSION: There is no active disease in the chest  This examination remains stable since the prior study  RICHA Mcfarland  2023 4:42:17 PM EDT           Sridevi Bee[address]  2023

## 2023-06-26 NOTE — ASSESSMENT & PLAN NOTE
· Chronic, with mild eye infection starting  · Will order Erythromycin eye ointment bid for 7 days  · Continue warm compresses

## 2023-06-26 NOTE — ASSESSMENT & PLAN NOTE
· Will place on watchful waiting UTI protocol  · The only evening medication is lisinopril  · Will order lisinopril 40 mg po daily in the am  · Monitor vital signs    · Encourage adequate hydration

## 2023-06-30 ENCOUNTER — NURSING HOME VISIT (OUTPATIENT)
Dept: GERIATRICS | Facility: OTHER | Age: 88
End: 2023-06-30
Payer: MEDICARE

## 2023-06-30 DIAGNOSIS — I15.0 RENOVASCULAR HYPERTENSION: Primary | ICD-10-CM

## 2023-06-30 DIAGNOSIS — I50.31 ACUTE DIASTOLIC CHF (CONGESTIVE HEART FAILURE) (HCC): ICD-10-CM

## 2023-06-30 DIAGNOSIS — E11.9 TYPE 2 DIABETES MELLITUS WITHOUT COMPLICATION, WITHOUT LONG-TERM CURRENT USE OF INSULIN (HCC): ICD-10-CM

## 2023-06-30 DIAGNOSIS — I48.0 PAROXYSMAL ATRIAL FIBRILLATION (HCC): ICD-10-CM

## 2023-06-30 DIAGNOSIS — R53.81 DEBILITY: ICD-10-CM

## 2023-06-30 PROCEDURE — 99310 SBSQ NF CARE HIGH MDM 45: CPT | Performed by: FAMILY MEDICINE

## 2023-06-30 NOTE — PROGRESS NOTES
[address] [facility]. [facility]  Progress Note  POS: Nursing Facility/LTC-32    Chief Complaint/Reason for visit: Follow up of MACKEY, uncontrolled hypertension  History of Present Illness: 8 year old female evaluated for follow up of [facility], volume overload on clinical exam, uncontrolled hypertension. See A&P below for additional HPI. SBP trending 140s-160s over past 48h. Weight stable. Improved breathlessness on exertion; not requiring supplemental O2. Some improvement in LE edema reported. Review of systems: Review of Systems   Constitutional: Negative for fever. Respiratory: Positive for shortness of breath. Cardiovascular: Positive for leg swelling. Negative for chest pain. Gastrointestinal: Negative for abdominal pain and nausea. Medications: Changes made- see written orders  Labs/Diagnostics (reviewed by this provider): Copy in Chart  BMP (6/27/23)  CBC, CMP, A1c (5/26/23)    Imaging Reviewed:  CXR (6/19/23)    Physical Exam    Weight: 146.6lb Temp:97F BP:150/70 Pulse:98 Resp:18 O2 Sat:94% RA  Physical Exam  Vitals and nursing note reviewed. Constitutional:       General: She is awake. She is not in acute distress. Appearance: She is not toxic-appearing or diaphoretic. HENT:      Head: Normocephalic. Nose: No rhinorrhea. Mouth/Throat:      Mouth: Mucous membranes are moist.   Eyes:      General: No scleral icterus. Pulmonary:      Effort: Pulmonary effort is normal. No respiratory distress. Musculoskeletal:      Cervical back: No rigidity. Right lower leg: Edema present. Left lower leg: Edema present. Skin:     Coloration: Skin is not jaundiced or pale. Neurological:      Mental Status: She is alert. Mental status is at baseline. Psychiatric:         Behavior: Behavior is cooperative.          Assessment/Plan:  8 year old female with:    Hypertension  Poorly controlled; goal <140-150/90, avoid hypotension  Continue amlodipine 5mg daily  Increase metoprolol succinate to 75mg daily  Continue lasix at increased dose of 20mg daily  Continue lisinopril 40mg daily  D/c PRN hydralazine    Acute diastolic CHF (congestive heart failure) ([address])  Clinical diagnosis in setting of uncontrolled hypertension and tachycardia/relative tachycardia  Echo from 4/2022 with EF of 70% and grade 1 diastolic dysfunction  Continue lasix at increased dose of 20mg daily; repeat BMP ordered for 7/5 to reassess renal function and electrolytes  Metoprolol succinate increase to 75mg daily  Monitor weights 3x/week; monitor volume status closely    Paroxysmal atrial fibrillation (HCC)  HR trending 80s-100s; goal HR<100  In setting of uncontrolled hypertension and tachycardia/ relative tachycardia (both likely contributing to clinical diagnosis of CHF), will increase metoprolol succinate to 75mg daily; hold for HR<60, SBP<100    Type 2 diabetes mellitus without complication, without long-term current use of insulin (HCC)  A1c 6.6 on 5/26/23; remains diet controlled  Goal A1c <8.5 given age and comorbidities  Metformin previously discontinued    Debility  Multifactorial  Continue LTC for 24/7 and ADL care  Supportive care, nutritional support  Fall precautions  Management of acute and chronic medical conditions as outlined    Laura Wren DO  6/30/23

## 2023-07-02 PROBLEM — I50.31 ACUTE DIASTOLIC CHF (CONGESTIVE HEART FAILURE) (HCC): Status: ACTIVE | Noted: 2023-07-02

## 2023-07-02 NOTE — ASSESSMENT & PLAN NOTE
Clinical diagnosis in setting of uncontrolled hypertension and tachycardia/relative tachycardia  Echo from 4/2022 with EF of 70% and grade 1 diastolic dysfunction  Continue lasix at increased dose of 20mg daily; repeat BMP ordered for 7/5 to reassess renal function and electrolytes  Metoprolol succinate increase to 75mg daily  Monitor weights 3x/week; monitor volume status closely

## 2023-07-02 NOTE — ASSESSMENT & PLAN NOTE
HR trending 80s-100s; goal HR<100  In setting of uncontrolled hypertension and tachycardia/ relative tachycardia (both likely contributing to clinical diagnosis of CHF), will increase metoprolol succinate to 75mg daily; hold for HR<60, SBP<100

## 2023-07-02 NOTE — ASSESSMENT & PLAN NOTE
A1c 6.6 on 5/26/23; remains diet controlled  Goal A1c <8.5 given age and comorbidities  Metformin previously discontinued

## 2023-07-02 NOTE — ASSESSMENT & PLAN NOTE
Multifactorial  Continue LTC for 24/7 and ADL care  Supportive care, nutritional support  Fall precautions  Management of acute and chronic medical conditions as outlined

## 2023-07-02 NOTE — ASSESSMENT & PLAN NOTE
Poorly controlled; goal <140-150/90, avoid hypotension  Continue amlodipine 5mg daily  Increase metoprolol succinate to 75mg daily  Continue lasix at increased dose of 20mg daily  Continue lisinopril 40mg daily  D/c PRN hydralazine

## 2023-08-19 PROBLEM — R05.9 COUGH: Status: RESOLVED | Noted: 2023-06-20 | Resolved: 2023-08-19

## 2023-08-21 ENCOUNTER — NURSING HOME VISIT (OUTPATIENT)
Dept: GERIATRICS | Facility: OTHER | Age: 88
End: 2023-08-21
Payer: MEDICARE

## 2023-08-21 DIAGNOSIS — I50.31 ACUTE DIASTOLIC CHF (CONGESTIVE HEART FAILURE) (HCC): ICD-10-CM

## 2023-08-21 DIAGNOSIS — I15.0 RENOVASCULAR HYPERTENSION: ICD-10-CM

## 2023-08-21 DIAGNOSIS — R26.2 AMBULATORY DYSFUNCTION: ICD-10-CM

## 2023-08-21 DIAGNOSIS — D64.9 ANEMIA, UNSPECIFIED TYPE: ICD-10-CM

## 2023-08-21 DIAGNOSIS — M79.89 SWELLING OF RIGHT UPPER EXTREMITY: Primary | ICD-10-CM

## 2023-08-21 DIAGNOSIS — E11.9 TYPE 2 DIABETES MELLITUS WITHOUT COMPLICATION, WITHOUT LONG-TERM CURRENT USE OF INSULIN (HCC): ICD-10-CM

## 2023-08-21 PROCEDURE — 99309 SBSQ NF CARE MODERATE MDM 30: CPT | Performed by: NURSE PRACTITIONER

## 2023-08-21 NOTE — PROGRESS NOTES
[address] [address] [phone] [facility]   Progress Note  LTC: POS 32      NAME: Felton Muniz  AGE: 80 y.o. SEX: female  :  1918  DATE OF ENCOUNTER: 2023    Chief Complaint   Patient seen and examined for follow up on chronic conditions. History of Present Illness     Felton Muniz is a 8 year old female patient seen and examined in long term care at [facility] to follow-up on acute and chronic medical conditions. Nursing reports that the patient has right upper extremity swelling. It was reported to have been found yesterday. Patient reports no pain or tenderness of her right arm. She denies recent injury or  fall. No chest pain, sob, abdominal pain, n/v/d, headache, dizziness reported. The following portions of the patient's history were reviewed and updated as appropriate: allergies, current medications, past family history, past medical history, past social history, past surgical history and problem list.    Review of Systems     A review of systems was performed. All negative, except as per HPI. History     Past Medical History:   Diagnosis Date   • Diabetes mellitus ([address])    • Hypertension    • Macular degeneration      No past surgical history on file. No family history on file. Social History     Socioeconomic History   • Marital status:       Spouse name: Not on file   • Number of children: Not on file   • Years of education: Not on file   • Highest education level: Not on file   Occupational History   • Not on file   Tobacco Use   • Smoking status: Never   • Smokeless tobacco: Never   Substance and Sexual Activity   • Alcohol use: No   • Drug use: No   • Sexual activity: Not on file   Other Topics Concern   • Not on file   Social History Narrative   • Not on file     Social Determinants of Health     Financial Resource Strain: Not on file   Food Insecurity: No Food Insecurity (2022)    Hunger Vital Sign    • Worried About Running Out of Food in the Last Year: Never true    • Ran Out of Food in the Last Year: Never true   Transportation Needs: No Transportation Needs (4/22/2022)    PRAPARE - Transportation    • Lack of Transportation (Medical): No    • Lack of Transportation (Non-Medical): No   Physical Activity: Not on file   Stress: Not on file   Social Connections: Not on file   Intimate Partner Violence: Not on file   Housing Stability: Unknown (4/22/2022)    Housing Stability Vital Sign    • Unable to Pay for Housing in the Last Year: No    • Number of Places Lived in the Last Year: Not on file    • Unstable Housing in the Last Year: No     No Known Allergies    Objective     Vital Signs  BP: 150/78  HR: 71  T: 98.4    RR: 20  O2Sat: 96% RA W: 144 lbs   General: NAD, Well Nourished, Well Developed  Neck: Supple, +ROM  Pulmonary: Respirations unlabored   Extremities: bilateral edema, + right lower forearm swelling,  +ROM, +Strength  Skin: Warm, Dry, no lesions, no rash, no erythema present, no ecchymosis present  Neurological: No cranial nerve deficits.    Psych: Awake and alert, no mood, no affect    Pertinent Laboratory/Diagnostic Studies Reviewed:   07/07/2023:           GLUCOSE 115 mg/dL 65-99 H Final              BUN 13 mg/dL 7-25  Final             CREATININE 0.78 mg/dL 0.40-1.10  Final             SODIUM 137 mmol/L 135-145  Final             POTASSIUM 4.1 mmol/L 3.5-5.2  Final             CHLORIDE 101 mmol/L 100-109  Final             CARBON DIOXIDE 27 mmol/L 21-31  Final             CALCIUM 8.4 mg/dL 8.5-10.1 L Final             ANION GAP 9  3-11  Final             eGFRcr (Note)  >59  Final      Not performed on patients less than 25years of age or greater than 80  years of age        eGFRcr COMMENT Not applicable    Final             06/20/2023:                HEMOGLOBIN 10.7 g/dL 11.5-14.5 L Final              HEMATOCRIT 32.0 % 35.0-43.0 L Final             WBC 7.0 thou/cmm 4.0-10.0  Final           RBC 3.57 mill/cmm 3.70-4.70 L Final             PLATELET COUNT 327 thou/cmm 140-350 H Final             MPV 6.9 fL 7.5-11.3 L Final             MCV 90 fL   Final             MCH 30.1 pg 26.0-34.0  Final             MCHC 33.5 g/dL 32.0-37.0  Final             RDW 14.1 % 12.0-16.0  Final             DIFFERENTIAL TYPE AUTO    Final             ABSOLUTE NEUT 4.0 thou/cmm 1.8-7.8  Final             ABSOLUTE LYMPH 1.9 thou/cmm 1.0-3.0  Final             ABSOLUTE MONO 0.8 thou/cmm 0.3-1.0  Final             ABSOLUTE EOS 0.3 thou/cmm 0.0-0.5  Final             ABSOLUTE BASO 0.0 thou/cmm 0.0-0.1  Final             NEUTROPHILS 58 %   Final             LYMPHOCYTES 27 %   Final             MONOCYTES 11 %   Final             EOSINOPHILS 4 %   Final             BASOPHILS 0 %   Final                 Current Medications     Current Medications Reviewed and updated in Nursing Home EMR. Assessment and Plan     Swelling of right upper extremity  · Swelling of right forearm on examination. No pain or tenderness reported. No recent falls or injury. · Will continue to monitor for worsening signs/symptoms  · Encourage right arm elevation as tolerated. Hypertension  · Goal BP <140-150/90; avoid hypotension  · SBP average for August so far is  around 146  · Continue metoprolol 75 mg po daily, lisinopril 40 mg daily, amlodipine 5 mg daily and lasix 20 mg daily  · Will monitor electrolytes and renal function. Type 2 diabetes mellitus without complication, without long-term current use of insulin (HCC)  · Goal A1C <8.5 given advanced age and multiple co-morbidities  ·  on 07/07/2023  · Metformin previously discontinued  · Avoid hypoglycemia      Anemia  · Baseline hemoglobin around 10  · Hemoglobin stable at  10.7 on 06/20/2023  · Will monitor CBC    Ambulatory dysfunction  · Patient is ambulates 20 feet with WC to follow an assist of one.   · Continue restorative therapy  · Maintain fall precautions. Acute diastolic CHF (congestive heart failure) ([address])  · Clinical diagnosis in the setting of uncontrolled hypertension and tachycardia/relative tachycardia.   · Echo 04/2022 with EF of 79% and grade one diastolic dysfunction  · Lasix recently increased to 20 mg po daily  · Metoprolol increased to 75 mg po daily  · No sob or increased lower extremity edema reported  · Swelling of right upper extremity present  · Continue to monitor weights and volume status             [facility]   08/21/2023

## 2023-08-24 PROBLEM — M79.89 SWELLING OF RIGHT UPPER EXTREMITY: Status: ACTIVE | Noted: 2023-08-21

## 2023-08-25 NOTE — ASSESSMENT & PLAN NOTE
· Goal A1C <8.5 given advanced age and multiple co-morbidities  ·  on 07/07/2023  · Metformin previously discontinued  · Avoid hypoglycemia

## 2023-08-25 NOTE — ASSESSMENT & PLAN NOTE
· Goal BP <140-150/90; avoid hypotension  · SBP average for August so far is  around 146  · Continue metoprolol 75 mg po daily, lisinopril 40 mg daily, amlodipine 5 mg daily and lasix 20 mg daily  · Will monitor electrolytes and renal function.

## 2023-08-25 NOTE — ASSESSMENT & PLAN NOTE
· Clinical diagnosis in the setting of uncontrolled hypertension and tachycardia/relative tachycardia.   · Echo 04/2022 with EF of 79% and grade one diastolic dysfunction  · Lasix recently increased to 20 mg po daily  · Metoprolol increased to 75 mg po daily  · No sob or increased lower extremity edema reported  · Swelling of right upper extremity present  · Continue to monitor weights and volume status

## 2023-08-25 NOTE — ASSESSMENT & PLAN NOTE
· Patient is ambulates 20 feet with WC to follow an assist of one. · Continue restorative therapy  · Maintain fall precautions.

## 2023-08-25 NOTE — ASSESSMENT & PLAN NOTE
· Swelling of right forearm on examination. No pain or tenderness reported. No recent falls or injury. · Will continue to monitor for worsening signs/symptoms  · Encourage right arm elevation as tolerated.

## 2023-09-29 ENCOUNTER — NURSING HOME VISIT (OUTPATIENT)
Dept: GERIATRICS | Facility: OTHER | Age: 88
End: 2023-09-29
Payer: MEDICARE

## 2023-09-29 DIAGNOSIS — I50.31 ACUTE DIASTOLIC CHF (CONGESTIVE HEART FAILURE) (HCC): ICD-10-CM

## 2023-09-29 DIAGNOSIS — M25.541 ARTHRALGIA OF BOTH HANDS: Primary | ICD-10-CM

## 2023-09-29 DIAGNOSIS — I48.0 PAROXYSMAL ATRIAL FIBRILLATION (HCC): ICD-10-CM

## 2023-09-29 DIAGNOSIS — I15.0 RENOVASCULAR HYPERTENSION: ICD-10-CM

## 2023-09-29 DIAGNOSIS — H02.9: ICD-10-CM

## 2023-09-29 DIAGNOSIS — E11.9 TYPE 2 DIABETES MELLITUS WITHOUT COMPLICATION, WITHOUT LONG-TERM CURRENT USE OF INSULIN (HCC): ICD-10-CM

## 2023-09-29 DIAGNOSIS — R53.81 DEBILITY: ICD-10-CM

## 2023-09-29 DIAGNOSIS — M25.542 ARTHRALGIA OF BOTH HANDS: Primary | ICD-10-CM

## 2023-09-29 PROCEDURE — 99309 SBSQ NF CARE MODERATE MDM 30: CPT | Performed by: NURSE PRACTITIONER

## 2023-09-29 NOTE — ASSESSMENT & PLAN NOTE
· Goal A1C <8.5 given advanced age and multiple co-morbidities  · A1C 6.6 on 05/26/2023  ·  on 07/07/2023  · Metformin previously discontinued  · Avoid hypoglycemia  · Recommend repeat A1C in one month

## 2023-09-29 NOTE — ASSESSMENT & PLAN NOTE
· Multifactorial  · Continue LTC for 24/7 care  · Continue nutritional and psychosocial support  · Manage acute and chronic medical conditions as outlined  · Maintain fall precautions.

## 2023-09-29 NOTE — ASSESSMENT & PLAN NOTE
· Clinical diagnosis in the setting of uncontrolled hypertension and tachycardia/relative tachycardia.   · Echo 04/2022 with EF of 79% and grade one diastolic dysfunction  · Continue Lasix  20 mg po daily  · Continue Metoprolol  75 mg po daily  · No sob or increased lower extremity edema reported  · Continue to monitor weights and volume status

## 2023-09-29 NOTE — PROGRESS NOTES
[facility] at [facility]  [address] [facility], [address] [place] [address]  [phone]    Progress Note  LTC: POS 32    ASSESSMENT AND PLAN:  1. Arthralgia of both hands  Assessment & Plan:  · Bilateral hand arthritic pain reported  · Will order Voltaren gel 2 gms to bilateral wrist and finger joints bid  · Keep hands warm  · Continue prn tylenol       2. Renovascular hypertension  Assessment & Plan:  · Goal BP <140-150/90; avoid hypotension  · SBP average 150-160  · Continue metoprolol 75 mg po daily, lisinopril 40 mg daily, amlodipine 5 mg daily and lasix 20 mg daily  · Will continue to monitor blood pressures and consider increasing amlodipine to 7.5 mg po daily if bp's remain above goal  · Will monitor electrolytes and renal function. 3. Type 2 diabetes mellitus without complication, without long-term current use of insulin (HCC)  Assessment & Plan:  · Goal A1C <8.5 given advanced age and multiple co-morbidities  · A1C 6.6 on 05/26/2023  ·  on 07/07/2023  · Metformin previously discontinued  · Avoid hypoglycemia  · Recommend repeat A1C in one month           4. Paroxysmal atrial fibrillation (HCC)  Assessment & Plan:  · Rate controlled  · Continue Metoprolol  75 mg po daily  · No anticoagulation related to advanced age and fall risk. 5. Debility  Assessment & Plan:  · Multifactorial  · Continue LTC for 24/7 care  · Continue nutritional and psychosocial support  · Manage acute and chronic medical conditions as outlined  · Maintain fall precautions. 6. Lesion of canthus of left eye  Assessment & Plan:  · Stable, no pain or drainage reported  · Continue warm compresses prn      7. Acute diastolic CHF (congestive heart failure) (HCC)  Assessment & Plan:  · Clinical diagnosis in the setting of uncontrolled hypertension and tachycardia/relative tachycardia.   · Echo 04/2022 with EF of 79% and grade one diastolic dysfunction  · Continue Lasix  20 mg po daily  · Continue Metoprolol  75 mg po daily  · No sob or increased lower extremity edema reported  · Continue to monitor weights and volume status                     Name: Amando Cervantes       :  1918              Sex: Female       HPI:    8 year old female patient of [facility] at [address] [place] [address] seen and examined today to follow-up on acute and chronic medical conditions. History obtained from nursing staff and patient. Patient is sitting in her chair, calm, cooperative and in no acute distress. She reports bilateral arthritic hand pain. She reports that this is a dull ache. She has difficulty moving all of her fingers due to contractures. Her appetite is reported to be good and she is moving her bowels regularly. The following portions of the patient's history were reviewed and updated as appropriate: allergies, current medications, past family history, past medical history, past social history, past surgical history and problem list.    ROS: Review of Systems   Constitutional: Negative for appetite change, chills, diaphoresis, fatigue, fever and unexpected weight change. Respiratory: Negative for cough, chest tightness, shortness of breath and wheezing. Cardiovascular: Negative for chest pain, palpitations and leg swelling. Gastrointestinal: Negative for abdominal pain, constipation, diarrhea, nausea and vomiting. Genitourinary: Negative for difficulty urinating and dysuria. Musculoskeletal: Positive for arthralgias and gait problem. Negative for back pain. Neurological: Negative for dizziness, light-headedness and headaches. Psychiatric/Behavioral: Negative for dysphoric mood and sleep disturbance. The patient is not nervous/anxious. No Known Allergies    Medications:    Current Outpatient Medications on File Prior to Visit   Medication Sig Dispense Refill   • Diclofenac Sodium (VOLTAREN) 1 % Apply 2 g topically 2 (two) times a day Bilateral wrist and finger joints.      • amLODIPine (NORVASC) 5 mg tablet Take 5 mg by mouth daily     • furosemide (LASIX) 20 mg tablet Take 20 mg by mouth daily     • guaiFENesin (ROBITUSSIN) 100 MG/5ML oral liquid Take 10 mL (200 mg total) by mouth 2 (two) times a day as needed for cough 120 mL 0   • hydrALAZINE (APRESOLINE) 25 mg tablet Take 25 mg by mouth 3 (three) times a day as needed     • ipratropium-albuterol (DUO-NEB) 0.5-2.5 mg/3 mL nebulizer solution Take 3 mL by nebulization 4 (four) times a day     • lisinopril (ZESTRIL) 20 mg tablet TAKE 1 TABLET(20 MG) BY MOUTH TWICE DAILY (Patient taking differently: Take 40 mg by mouth daily) 180 tablet 3   • metoprolol succinate (TOPROL-XL) 25 mg 24 hr tablet Take 2 tablets (50 mg total) by mouth daily (Patient taking differently: Take 75 mg by mouth daily)       No current facility-administered medications on file prior to visit. History:  Past Medical History:   Diagnosis Date   • Diabetes mellitus ([address])    • Hypertension    • Macular degeneration      No past surgical history on file. No family history on file. Social History     Socioeconomic History   • Marital status:      Spouse name: Not on file   • Number of children: Not on file   • Years of education: Not on file   • Highest education level: Not on file   Occupational History   • Not on file   Tobacco Use   • Smoking status: Never   • Smokeless tobacco: Never   Substance and Sexual Activity   • Alcohol use: No   • Drug use: No   • Sexual activity: Not on file   Other Topics Concern   • Not on file   Social History Narrative   • Not on file     Social Determinants of Health     Financial Resource Strain: Not on file   Food Insecurity: No Food Insecurity (4/22/2022)    Hunger Vital Sign    • Worried About Running Out of Food in the Last Year: Never true    • Ran Out of Food in the Last Year: Never true   Transportation Needs: No Transportation Needs (4/22/2022)    PRAPARE - Transportation    • Lack of Transportation (Medical):  No    • Lack of Transportation (Non-Medical): No   Physical Activity: Not on file   Stress: Not on file   Social Connections: Not on file   Intimate Partner Violence: Not on file   Housing Stability: Unknown (4/22/2022)    Housing Stability Vital Sign    • Unable to Pay for Housing in the Last Year: No    • Number of Places Lived in the Last Year: Not on file    • Unstable Housing in the Last Year: No     No past surgical history on file. OBJECTIVE:  Vital Signs:  /80, Temp 97.5, HR 83, RR 16, SpO2 98% RA, Wgt 146.2 lbs    Physical Exam  Constitutional:       General: She is not in acute distress. Appearance: Normal appearance. She is not ill-appearing, toxic-appearing or diaphoretic. HENT:      Head: Normocephalic and atraumatic. Right Ear: External ear normal.      Left Ear: External ear normal.      Nose: Nose normal.      Mouth/Throat:      Mouth: Mucous membranes are moist.      Pharynx: No oropharyngeal exudate or posterior oropharyngeal erythema. Eyes:      General: No scleral icterus. Right eye: No discharge. Left eye: No discharge. Extraocular Movements: Extraocular movements intact. Conjunctiva/sclera: Conjunctivae normal.      Comments: Left lower eyelid chronic lesion. Cardiovascular:      Rate and Rhythm: Normal rate and regular rhythm. Pulses: Normal pulses. Pulmonary:      Effort: Pulmonary effort is normal. No respiratory distress. Breath sounds: Normal breath sounds. No wheezing, rhonchi or rales. Abdominal:      General: Bowel sounds are normal. There is no distension. Palpations: Abdomen is soft. There is no mass. Tenderness: There is no abdominal tenderness. There is no guarding. Musculoskeletal:         General: No tenderness. Normal range of motion. Right lower leg: No edema. Left lower leg: No edema. Comments: Arthritic changes  Bilateral finger contractures    Skin:     General: Skin is warm and dry.    Neurological:      General: No focal deficit present. Mental Status: She is alert. Mental status is at baseline. Gait: Gait abnormal.   Psychiatric:         Mood and Affect: Mood normal.         Behavior: Behavior normal.         Thought Content: Thought content normal.         Labs & Imaging Reviewed in [facility], nursing home EMR.          Alice Su, [address] [facility]  09/29/2023

## 2023-09-29 NOTE — ASSESSMENT & PLAN NOTE
· Rate controlled  · Continue Metoprolol  75 mg po daily  · No anticoagulation related to advanced age and fall risk.

## 2023-09-29 NOTE — ASSESSMENT & PLAN NOTE
· Goal BP <140-150/90; avoid hypotension  · SBP average 150-160  · Continue metoprolol 75 mg po daily, lisinopril 40 mg daily, amlodipine 5 mg daily and lasix 20 mg daily  · Will continue to monitor blood pressures and consider increasing amlodipine to 7.5 mg po daily if bp's remain above goal  · Will monitor electrolytes and renal function.

## 2023-11-03 ENCOUNTER — TELEPHONE (OUTPATIENT)
Dept: OTHER | Facility: HOSPITAL | Age: 88
End: 2023-11-03

## 2023-11-03 NOTE — TELEPHONE ENCOUNTER
Patient is hallucinating, possible UTI. Please call for orders for UA and CNS. TC to on call provider. ..

## 2023-11-06 ENCOUNTER — NURSING HOME VISIT (OUTPATIENT)
Dept: GERIATRICS | Facility: OTHER | Age: 88
End: 2023-11-06
Payer: MEDICARE

## 2023-11-06 DIAGNOSIS — N39.0 E-COLI UTI: Primary | ICD-10-CM

## 2023-11-06 DIAGNOSIS — B96.20 E-COLI UTI: Primary | ICD-10-CM

## 2023-11-06 PROCEDURE — 99309 SBSQ NF CARE MODERATE MDM 30: CPT | Performed by: FAMILY MEDICINE

## 2023-11-06 NOTE — PROGRESS NOTES
[address] [facility]. [facility]  Progress Note- Acute Visit  POS: Nursing Facility/LTC-32    Chief Complaint/Reason for visit: Hallucination, leukocytosis, +urine cx  History of Present Illness: 8 year old female evaluated for acute visit. Visual hallucination reported over the weekend; lab workup revealing leukocytosis and urine culture +Ecoli. No fevers. Review of systems: Review of Systems   Constitutional:  Negative for chills and fever. Respiratory:  Negative for cough and shortness of breath. Genitourinary:  Negative for difficulty urinating and dysuria. Psychiatric/Behavioral:  Positive for hallucinations. Medications: Changes made- see written orders  Labs/Diagnostics (reviewed by this provider): Copy in Chart  Urine Cx (11/4/23) >100,000 Ecoli  CBC, CMP (11/4/23) WBC 10.4 Creat 0.66 Na 140 K 4.2 Glc 128    Physical Exam    Vitals reviewed in SNF EMR    Physical Exam  Vitals and nursing note reviewed. Constitutional:       General: She is awake. She is not in acute distress. Appearance: She is not toxic-appearing or diaphoretic. HENT:      Head: Normocephalic and atraumatic. Nose: No rhinorrhea. Mouth/Throat:      Mouth: Mucous membranes are moist.   Eyes:      General:         Right eye: No discharge. Left eye: No discharge. Pulmonary:      Effort: Pulmonary effort is normal. No respiratory distress. Musculoskeletal:      Cervical back: No rigidity. Right lower leg: Edema present. Left lower leg: Edema present. Skin:     Coloration: Skin is not jaundiced or pale. Neurological:      Mental Status: She is alert. Mental status is at baseline. Psychiatric:         Behavior: Behavior is cooperative.          Assessment/Plan:  8 year old female with:    E-coli UTI  Associated leukocytosis and visual hallucinations  Encourage PO hydration  Urine Cx >100,000 pansensitive Ecoli  Start cephalexin 500mg PO Q12h x 7 days Tiffani Chacon,   11/6/23

## 2023-11-06 NOTE — ASSESSMENT & PLAN NOTE
Associated leukocytosis and visual hallucinations  Encourage PO hydration  Urine Cx >100,000 pansensitive Ecoli  Start cephalexin 500mg PO Q12h x 7 days

## 2023-11-07 ENCOUNTER — NURSING HOME VISIT (OUTPATIENT)
Dept: GERIATRICS | Facility: OTHER | Age: 88
End: 2023-11-07
Payer: MEDICARE

## 2023-11-07 DIAGNOSIS — R19.5 LOOSE STOOLS: Primary | ICD-10-CM

## 2023-11-07 PROCEDURE — 99308 SBSQ NF CARE LOW MDM 20: CPT | Performed by: NURSE PRACTITIONER

## 2023-11-07 RX ORDER — SACCHAROMYCES BOULARDII 250 MG
250 CAPSULE ORAL 2 TIMES DAILY
COMMUNITY
Start: 2023-11-07 | End: 2023-11-21

## 2023-11-07 NOTE — PROGRESS NOTES
[facility] at [facility]  [address] [place] [address]  [phone]    Acute Visit Note  LTC: POS 32    ASSESSMENT AND PLAN:  1. Loose stools  Assessment & Plan:  Loose stools reported after starting antibiotic for UTI  Will order Florastor 250 mg bid while taking antibiotic. Monitor for signs or symptoms of C-Diff infection. Name:  Arlyn Bernstein      : 1918               Sex: Female     HPI:     8-year-old female seen in examined in her room today per nursing request for loose stools. It is reported that the patient has been having multiple loose stools over the past few days. She was recently started on cephalexin for a UTI. No fevers or chills reported. The following portions of the patient's history were reviewed and updated as appropriate: allergies, current medications, past family history, past medical history, past social history, past surgical history and problem list.    ROS: Review of Systems   Constitutional:  Negative for chills, fatigue and fever. Respiratory:  Negative for chest tightness and shortness of breath. Gastrointestinal:  Positive for diarrhea. Negative for abdominal distention, abdominal pain and nausea. Genitourinary:  Negative for difficulty urinating and dysuria. No Known Allergies    Medications:    Current Outpatient Medications on File Prior to Visit   Medication Sig Dispense Refill    amLODIPine (NORVASC) 5 mg tablet Take 5 mg by mouth daily      Diclofenac Sodium (VOLTAREN) 1 % Apply 2 g topically 2 (two) times a day Bilateral wrist and finger joints.       furosemide (LASIX) 20 mg tablet Take 20 mg by mouth daily      guaiFENesin (ROBITUSSIN) 100 MG/5ML oral liquid Take 10 mL (200 mg total) by mouth 2 (two) times a day as needed for cough 120 mL 0    hydrALAZINE (APRESOLINE) 25 mg tablet Take 25 mg by mouth 3 (three) times a day as needed      ipratropium-albuterol (DUO-NEB) 0.5-2.5 mg/3 mL nebulizer solution Take 3 mL by nebulization 4 (four) times a day      lisinopril (ZESTRIL) 20 mg tablet TAKE 1 TABLET(20 MG) BY MOUTH TWICE DAILY (Patient taking differently: Take 40 mg by mouth daily) 180 tablet 3    metoprolol succinate (TOPROL-XL) 25 mg 24 hr tablet Take 2 tablets (50 mg total) by mouth daily (Patient taking differently: Take 75 mg by mouth daily)       No current facility-administered medications on file prior to visit. History:  Past Medical History:   Diagnosis Date    Diabetes mellitus ([address])     Hypertension     Macular degeneration      No past surgical history on file. No family history on file. Social History     Socioeconomic History    Marital status:      Spouse name: Not on file    Number of children: Not on file    Years of education: Not on file    Highest education level: Not on file   Occupational History    Not on file   Tobacco Use    Smoking status: Never    Smokeless tobacco: Never   Substance and Sexual Activity    Alcohol use: No    Drug use: No    Sexual activity: Not on file   Other Topics Concern    Not on file   Social History Narrative    Not on file     Social Determinants of Health     Financial Resource Strain: Not on file   Food Insecurity: No Food Insecurity (4/22/2022)    Hunger Vital Sign     Worried About Running Out of Food in the Last Year: Never true     Ran Out of Food in the Last Year: Never true   Transportation Needs: No Transportation Needs (4/22/2022)    PRAPARE - Transportation     Lack of Transportation (Medical): No     Lack of Transportation (Non-Medical):  No   Physical Activity: Not on file   Stress: Not on file   Social Connections: Not on file   Intimate Partner Violence: Not on file   Housing Stability: Unknown (4/22/2022)    Housing Stability Vital Sign     Unable to Pay for Housing in the Last Year: No     Number of Places Lived in the Last Year: Not on file     Unstable Housing in the Last Year: No     No past surgical history on file.    OBJECTIVE:  Vital Signs:  /77, HR 86, Temp 98.0, RR 20, SpO2 98% RA, Wgt: 147.8 lbs. Physical Exam  Constitutional:       General: She is not in acute distress. Appearance: Normal appearance. She is not ill-appearing or toxic-appearing. Pulmonary:      Effort: Pulmonary effort is normal. No respiratory distress. Abdominal:      General: Bowel sounds are normal. There is no distension. Palpations: Abdomen is soft. There is no mass. Tenderness: There is no abdominal tenderness. There is no guarding. Skin:     General: Skin is warm and dry. Neurological:      General: No focal deficit present. Mental Status: She is alert. Mental status is at baseline. Psychiatric:         Mood and Affect: Mood normal.         Behavior: Behavior normal.         Thought Content: Thought content normal.       Labs & Imaging Reviewed in [facility] EMR.    Microbiology Report Below    Final     SPECIMEN DESCRIPTION     : Urine  SPECIAL REQUESTS         : NONE  CULTURE RESULTS          :     >100,000 CFU/ml  **ESCHERICHIA COLI**  REPORT STATUS            : Final 11/05/2023  ORGANISM                 : >100,000 CFU/ml  **ESCHERICHIA COLI**  SENSITIVITY              : HENRY  PIP/TAZO                 : <=4 Susceptible  CEFEPIME                 : <=1 Susceptible  AZTREONAM                : <=1 Susceptible  MEROPENEM                : <=0.25 Susceptible  GENTAMICIN               : <=1 Susceptible  CIPRO                    : <=0.25 Susceptible  TRIMETHOPRIM-SUL         : <=20 Susceptible  AMPICILLIN               : <=2 Susceptible  CEFAZOLIN                : <=4 Susceptible  CEFTRIAXONE              : <=1 Susceptible  NITROFURANTOIN           : <=16 Susceptible  CEFTAZIDIME              : <=1 Susceptible  ERTAPENEM                : <=0.5 Susceptible       Caesar Alford, [address] [place] [address]  11/07/2023

## 2023-11-24 PROBLEM — R19.5 LOOSE STOOLS: Status: ACTIVE | Noted: 2023-11-07

## 2023-11-25 NOTE — ASSESSMENT & PLAN NOTE
Loose stools reported after starting antibiotic for UTI  Will order Florastor 250 mg bid while taking antibiotic. Monitor for signs or symptoms of C-Diff infection.

## 2023-11-27 ENCOUNTER — NURSING HOME VISIT (OUTPATIENT)
Dept: GERIATRICS | Facility: OTHER | Age: 88
End: 2023-11-27
Payer: MEDICARE

## 2023-11-27 DIAGNOSIS — E11.9 TYPE 2 DIABETES MELLITUS WITHOUT COMPLICATION, WITHOUT LONG-TERM CURRENT USE OF INSULIN (HCC): Primary | ICD-10-CM

## 2023-11-27 DIAGNOSIS — R53.81 DEBILITY: ICD-10-CM

## 2023-11-27 DIAGNOSIS — I50.32 CHRONIC DIASTOLIC CHF (CONGESTIVE HEART FAILURE) (HCC): ICD-10-CM

## 2023-11-27 DIAGNOSIS — I15.0 RENOVASCULAR HYPERTENSION: ICD-10-CM

## 2023-11-27 DIAGNOSIS — I10 ESSENTIAL HYPERTENSION: ICD-10-CM

## 2023-11-27 PROBLEM — B96.20 E-COLI UTI: Status: RESOLVED | Noted: 2023-11-06 | Resolved: 2023-11-27

## 2023-11-27 PROBLEM — N39.0 E-COLI UTI: Status: RESOLVED | Noted: 2023-11-06 | Resolved: 2023-11-27

## 2023-11-27 PROCEDURE — 99309 SBSQ NF CARE MODERATE MDM 30: CPT | Performed by: FAMILY MEDICINE

## 2023-11-27 RX ORDER — LISINOPRIL 40 MG/1
40 TABLET ORAL DAILY
Start: 2023-11-27

## 2023-11-27 RX ORDER — METOPROLOL SUCCINATE 25 MG/1
75 TABLET, EXTENDED RELEASE ORAL DAILY
Start: 2023-11-27

## 2023-11-27 NOTE — PROGRESS NOTES
[facility]. [facility]  Progress Note  POS: Nursing Facility/LTC-32    Chief Complaint/Reason for visit: Follow up of chronic medical conditions   History of Present Illness: 8 year old female evaluated for routine follow up of chronic medical conditions. Recently completed treatment with cephalexin for Ecoli UTI; reports of loose stool following antibiotic use which has resolved- was treated with florastor. No fever, chills, dysuria, hallucinations reported. See A&P below for additional HPI. Review of systems: Review of Systems   Constitutional:  Negative for activity change, appetite change, chills, fever and unexpected weight change. Respiratory:  Negative for cough and shortness of breath. Psychiatric/Behavioral:  Negative for confusion and sleep disturbance. Medications: No changes made  Labs/Diagnostics (reviewed by this provider): Copy in Chart  (11/4/23) CBC, CMP  A1c 6/6 (5/2023)    Physical Exam    Weight: 147.8lb Temp:97.5F BP:130/62 Pulse:82 Resp:18 O2 Sat:95%RA    Physical Exam  Vitals and nursing note reviewed. Constitutional:       General: She is awake. She is not in acute distress. Appearance: She is well-groomed. She is not toxic-appearing or diaphoretic. HENT:      Head: Normocephalic and atraumatic. Nose: No rhinorrhea. Mouth/Throat:      Mouth: Mucous membranes are moist.   Eyes:      General:         Right eye: No discharge. Left eye: No discharge. Pulmonary:      Effort: Pulmonary effort is normal. No respiratory distress. Musculoskeletal:      Cervical back: No rigidity. Skin:     Coloration: Skin is not jaundiced or pale. Neurological:      Mental Status: She is alert. Mental status is at baseline. Psychiatric:         Behavior: Behavior is cooperative.        Assessment/Plan:  8 year old female with:    Type 2 diabetes mellitus without complication, without long-term current use of insulin ([facility])  A1c 6.6 on 5/26/23; remains diet controlled  Goal A1c <8.5 given age and comorbidities.  Given advanced age, unlikely would restart oral hypoglycemic agent unless drastic increase in A1c or clinical symptoms  Metformin previously discontinued  Plan for repeat A1c and BMP approx May 2024 unless change in clinical condition; recent UTI but no complaints of urinary frequency, fasting glucose 128 per CMP on 11/4/23    Chronic diastolic CHF (congestive heart failure) ([address])  Echo from 4/2022 with EF of 70% and grade 1 diastolic dysfunction  Weight stable within 1lb over past 2 months  Continue to monitor weight and volume status closely  Continue lasix 20mg daily  Continue metoprolol succinate 75mg daily    Hypertension  Goal <140-150/90, avoid hypotension  Continue amlodipine 5mg daily  Continue metoprolol succinate to 75mg daily and lasix 20mg daily in setting of above  Continue lisinopril 40mg daily  Recent CMP reviewed and stable- plan to recheck with routine labs approx May 2024, sooner if change in clinical condition    Debility  Multifactorial  Continue LTC for 24/7 and ADL care  Supportive care, nutritional support  Fall precautions  Management of chronic medical conditions as outlined     Dahiana Nicole DO  11/27/23

## 2023-11-27 NOTE — ASSESSMENT & PLAN NOTE
A1c 6.6 on 5/26/23; remains diet controlled  Goal A1c <8.5 given age and comorbidities.  Given advanced age, unlikely would restart oral hypoglycemic agent unless drastic increase in A1c or clinical symptoms  Metformin previously discontinued  Plan for repeat A1c and BMP approx May 2024 unless change in clinical condition; recent UTI but no complaints of urinary frequency, fasting glucose 128 per CMP on 11/4/23

## 2023-11-27 NOTE — ASSESSMENT & PLAN NOTE
Echo from 4/2022 with EF of 70% and grade 1 diastolic dysfunction  Weight stable within 1lb over past 2 months  Continue to monitor weight and volume status closely  Continue lasix 20mg daily  Continue metoprolol succinate 75mg daily

## 2023-11-27 NOTE — ASSESSMENT & PLAN NOTE
Goal <140-150/90, avoid hypotension  Continue amlodipine 5mg daily  Continue metoprolol succinate to 75mg daily and lasix 20mg daily in setting of above  Continue lisinopril 40mg daily  Recent CMP reviewed and stable- plan to recheck with routine labs approx May 2024, sooner if change in clinical condition

## 2023-12-27 ENCOUNTER — NURSING HOME VISIT (OUTPATIENT)
Dept: GERIATRICS | Facility: OTHER | Age: 88
End: 2023-12-27
Payer: MEDICARE

## 2023-12-27 DIAGNOSIS — E11.9 TYPE 2 DIABETES MELLITUS WITHOUT COMPLICATION, WITHOUT LONG-TERM CURRENT USE OF INSULIN (HCC): ICD-10-CM

## 2023-12-27 DIAGNOSIS — I15.0 RENOVASCULAR HYPERTENSION: ICD-10-CM

## 2023-12-27 DIAGNOSIS — R53.81 DEBILITY: ICD-10-CM

## 2023-12-27 DIAGNOSIS — I50.32 CHRONIC DIASTOLIC CHF (CONGESTIVE HEART FAILURE) (HCC): Primary | ICD-10-CM

## 2023-12-27 DIAGNOSIS — D64.9 ANEMIA, UNSPECIFIED TYPE: ICD-10-CM

## 2023-12-27 PROCEDURE — 99309 SBSQ NF CARE MODERATE MDM 30: CPT | Performed by: FAMILY MEDICINE

## 2023-12-27 NOTE — PROGRESS NOTES
[facility]  Progress Note  POS: Nursing Facility/LTC-32    Chief Complaint/Reason for visit: Follow up of chronic medical conditions  History of Present Illness: 89 year old female evaluated for routine follow up of chronic medical conditions. Wound team following for recent LE skin tear. Weight -2lb/ 1 month, will continue to monitor trend.        Review of systems: Review of Systems   Constitutional:  Negative for chills, fever and unexpected weight change.   Respiratory:  Negative for cough and shortness of breath.    Cardiovascular:  Positive for leg swelling (chronic).      Medications: No changes made  Labs/Diagnostics (reviewed by this provider): Copy in Chart  (11/4/23) CBC, CMP    Physical Exam    Vitals reviewed in SNF EMR    Physical Exam  Vitals and nursing note reviewed.   Constitutional:       General: She is awake. She is not in acute distress.     Appearance: She is not toxic-appearing or diaphoretic.   HENT:      Head: Normocephalic and atraumatic.      Ears:      Comments: +[place]     Mouth/Throat:      Mouth: Mucous membranes are moist.   Eyes:      General: No scleral icterus.        Right eye: No discharge.         Left eye: No discharge.   Pulmonary:      Effort: Pulmonary effort is normal. No respiratory distress.   Musculoskeletal:      Cervical back: No rigidity.      Right lower leg: Edema present.      Left lower leg: Edema present.   Skin:     Coloration: Skin is not jaundiced or pale.   Neurological:      Mental Status: She is alert. Mental status is at baseline.   Psychiatric:         Behavior: Behavior is cooperative.       Assessment/Plan:  89 year old female with:    Chronic diastolic CHF (congestive heart failure) ([facility])  Echo from 4/2022 with EF of 70% and grade 1 diastolic dysfunction  Weight stable within 1lb over past 2 months, down 2lb over 1 month  Continue to monitor weight and volume status closely  Continue lasix 20mg  daily  Continue metoprolol succinate 75mg daily  Continue lisinopril 40mg daily  Monitor metabolic panel Q6 months, sooner if change in clinical status- next routine BMP approx April 2024    Hypertension  Goal <140-150/90, avoid hypotension. BP log reviewed and overall at goal.   Continue amlodipine 5mg daily  Continue metoprolol succinate to 75mg daily and lasix 20mg daily in setting of above  Continue lisinopril 40mg daily  Recent CMP reviewed and stable- plan to recheck with routine labs approx Apr 2024, sooner if change in clinical condition    Anemia  Hgb 11.7  most recent labs; improved from baseline  Next due for routine CBC approx April 2024    Type 2 diabetes mellitus without complication, without long-term current use of insulin ([facility])  A1c 6.6 on 5/26/23; remains diet controlled with stable weight overall  Goal A1c <8.5 given age and comorbidities. Given advanced age, unlikely would restart oral hypoglycemic agent unless drastic increase in A1c or clinical symptoms  Metformin previously discontinued  Plan for repeat A1c and BMP approx Apr 2024 unless change in clinical condition    Debility  Multifactorial  Continue LTC for 24/7 and ADL care  Supportive care, nutritional support  Fall precautions  Management of chronic medical conditions as outlined     Katherine Stevenson, DO  12/27/23

## 2024-01-03 PROBLEM — R19.5 LOOSE STOOLS: Status: RESOLVED | Noted: 2023-11-07 | Resolved: 2024-01-03

## 2024-01-03 PROBLEM — N94.9 VAGINAL BURNING: Status: RESOLVED | Noted: 2023-06-12 | Resolved: 2024-01-03

## 2024-01-03 PROBLEM — R06.2 WHEEZING: Status: RESOLVED | Noted: 2023-04-19 | Resolved: 2024-01-03

## 2024-01-03 NOTE — ASSESSMENT & PLAN NOTE
A1c 6.6 on 5/26/23; remains diet controlled with stable weight overall  Goal A1c <8.5 given age and comorbidities. Given advanced age, unlikely would restart oral hypoglycemic agent unless drastic increase in A1c or clinical symptoms  Metformin previously discontinued  Plan for repeat A1c and BMP approx Apr 2024 unless change in clinical condition

## 2024-01-03 NOTE — ASSESSMENT & PLAN NOTE
Echo from 4/2022 with EF of 70% and grade 1 diastolic dysfunction  Weight stable within 1lb over past 2 months, down 2lb over 1 month  Continue to monitor weight and volume status closely  Continue lasix 20mg daily  Continue metoprolol succinate 75mg daily  Continue lisinopril 40mg daily  Monitor metabolic panel Q6 months, sooner if change in clinical status- next routine BMP approx April 2024

## 2024-01-03 NOTE — ASSESSMENT & PLAN NOTE
Goal <140-150/90, avoid hypotension. BP log reviewed and overall at goal.   Continue amlodipine 5mg daily  Continue metoprolol succinate to 75mg daily and lasix 20mg daily in setting of above  Continue lisinopril 40mg daily  Recent CMP reviewed and stable- plan to recheck with routine labs approx Apr 2024, sooner if change in clinical condition

## 2024-01-30 ENCOUNTER — NURSING HOME VISIT (OUTPATIENT)
Dept: GERIATRICS | Facility: OTHER | Age: 89
End: 2024-01-30
Payer: MEDICARE

## 2024-01-30 DIAGNOSIS — I15.0 RENOVASCULAR HYPERTENSION: ICD-10-CM

## 2024-01-30 DIAGNOSIS — R26.2 AMBULATORY DYSFUNCTION: ICD-10-CM

## 2024-01-30 DIAGNOSIS — R53.81 DEBILITY: ICD-10-CM

## 2024-01-30 DIAGNOSIS — H35.30 ARMD (AGE RELATED MACULAR DEGENERATION): ICD-10-CM

## 2024-01-30 DIAGNOSIS — I50.32 CHRONIC DIASTOLIC CHF (CONGESTIVE HEART FAILURE) (HCC): Primary | ICD-10-CM

## 2024-01-30 PROCEDURE — 99309 SBSQ NF CARE MODERATE MDM 30: CPT | Performed by: FAMILY MEDICINE

## 2024-01-30 NOTE — PROGRESS NOTES
[facility]  Progress Note  POS: Nursing Facility/LT-32    Chief Complaint/Reason for visit: Follow up of chronic medical conditions  History of Present Illness: 89 year old female evaluated for routine follow up of chronic medical conditions. Recent fall on 1/21 without reported injuries (lower to the floor due to knees buckling). Recent speech therapy evaluation and diet upgrade noted; no reports of coughing with eating or swallowing difficulties.        Review of systems: Review of Systems   Constitutional:  Negative for fever and unexpected weight change.   HENT:  Negative for trouble swallowing.    Respiratory:  Negative for cough and shortness of breath.    Musculoskeletal:  Negative for arthralgias.   Skin:  Positive for wound.      Medications: No changes made  Consults reviewed: Wound Care, Speech therapy    Physical Exam    Vitals reviewed in SNF EMR    Physical Exam  Vitals and nursing note reviewed.   Constitutional:       General: She is awake. She is not in acute distress.     Appearance: She is well-groomed. She is not toxic-appearing or diaphoretic.   HENT:      Head: Normocephalic and atraumatic.      Nose: No rhinorrhea.      Mouth/Throat:      Mouth: Mucous membranes are moist.   Pulmonary:      Effort: Pulmonary effort is normal. No respiratory distress.   Musculoskeletal:      Right lower leg: Edema present.      Left lower leg: Edema present.   Skin:     Coloration: Skin is not jaundiced or pale.   Neurological:      Mental Status: She is alert. Mental status is at baseline.   Psychiatric:         Behavior: Behavior is cooperative.       Assessment/Plan:  89 year old female with:    Chronic diastolic CHF (congestive heart failure) ([facility])  Echo from 4/2022 with EF of 70% and grade 1 diastolic dysfunction  Weight stable within 1lb over past month  Continue to monitor weight and volume status closely  Continue lasix 20mg daily  Continue metoprolol  succinate 75mg daily  Continue lisinopril 40mg daily  Monitor metabolic panel Q6 months, sooner if change in clinical status- next routine BMP approx April 2024    Hypertension  Goal <140-150/90, avoid hypotension. 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

## 2024-02-21 PROBLEM — M79.89 SWELLING OF RIGHT UPPER EXTREMITY: Status: RESOLVED | Noted: 2023-08-21 | Resolved: 2024-02-21

## 2024-02-21 PROBLEM — R06.09 DYSPNEA ON EXERTION: Status: RESOLVED | Noted: 2023-06-26 | Resolved: 2024-02-21

## 2024-02-21 NOTE — ASSESSMENT & PLAN NOTE
Echo from 4/2022 with EF of 70% and grade 1 diastolic dysfunction  Weight stable within 1lb over past month  Continue to monitor weight and volume status closely  Continue lasix 20mg daily  Continue metoprolol succinate 75mg daily  Continue lisinopril 40mg daily  Monitor metabolic panel Q6 months, sooner if change in clinical status- next routine BMP approx April 2024

## 2024-02-21 NOTE — ASSESSMENT & PLAN NOTE
Goal <140-150/90, avoid hypotension. SBP trending 120s-130s over past month   Continue amlodipine 5mg daily  Continue metoprolol succinate to 75mg daily and lasix 20mg daily in setting of above  Continue lisinopril 40mg daily  Due for routine BMP approx Apr 2024 (to reassess electrolytes and renal function on diuretic and ACE-I), sooner if change in clinical condition

## 2024-02-27 ENCOUNTER — NURSING HOME VISIT (OUTPATIENT)
Dept: GERIATRICS | Facility: OTHER | Age: 89
End: 2024-02-27
Payer: MEDICARE

## 2024-02-27 DIAGNOSIS — M15.9 GENERALIZED OSTEOARTHRITIS: ICD-10-CM

## 2024-02-27 DIAGNOSIS — I15.0 RENOVASCULAR HYPERTENSION: ICD-10-CM

## 2024-02-27 DIAGNOSIS — R53.81 DEBILITY: ICD-10-CM

## 2024-02-27 DIAGNOSIS — I50.32 CHRONIC DIASTOLIC CHF (CONGESTIVE HEART FAILURE) (HCC): Primary | ICD-10-CM

## 2024-02-27 PROCEDURE — 99309 SBSQ NF CARE MODERATE MDM 30: CPT | Performed by: FAMILY MEDICINE

## 2024-02-27 NOTE — PROGRESS NOTES
[facility]  Progress Note  POS: Nursing Facility/LTC-32    Chief Complaint/Reason for visit: Follow up of chronic medical conditions  History of Present Illness: 89 year old female evaluated for routine follow up of chronic medical conditions. 3lb weight loss over past 2 months noted; net stable over past 5 months; fortified milk discontinued as patient was refusing, nutrition following. Wound CRNP following for LLE abrasion, treatment changed 2/26. No recent use of PRN acetaminophen for pain noted.       Review of systems: Review of Systems   Constitutional:  Negative for fever.   Respiratory:  Negative for cough and shortness of breath.       Medications: No changes made  Consults reviewed: Wound care    Physical Exam    Vitals reviewed in SNF EMR    Physical Exam  Vitals and nursing note reviewed.   Constitutional:       General: She is awake. She is not in acute distress.     Appearance: She is well-groomed. She is not toxic-appearing or diaphoretic.   HENT:      Head: Normocephalic and atraumatic.      Nose: No rhinorrhea.      Mouth/Throat:      Mouth: Mucous membranes are moist.   Eyes:      General:         Right eye: No discharge.         Left eye: No discharge.   Pulmonary:      Effort: Pulmonary effort is normal. No respiratory distress.   Skin:     Coloration: Skin is not jaundiced or pale.   Neurological:      Mental Status: She is alert. Mental status is at baseline.   Psychiatric:         Behavior: Behavior is cooperative.       Assessment/Plan:  89 year old female with:    Chronic diastolic CHF (congestive heart failure) ([facility])  Echo from 4/2022 with EF of 70% and grade 1 diastolic dysfunction  Weight stable long term with some variation over past 1-2 months- continue to trend weights, monitor volume status closely  Continue lasix 20mg daily  Continue metoprolol succinate 75mg daily  Continue lisinopril 40mg daily  Monitor metabolic panel Q6 months,  sooner if change in clinical status- next routine BMP approx April 2024    Generalized osteoarthritis  Continue acetaminophen PRN- infrequent use noted  Continue topical diclofenac for painful wrists/hands- consider trial of change to PRN if infrequent pain complaints    Hypertension  Goal <140-150/90, avoid hypotension. SBP trending 130s-140s over past month   Continue amlodipine 5mg daily  Continue metoprolol succinate, lasix, lisinopril in setting of CHF  Due for routine BMP in Apr 2024 (to reassess electrolytes and renal function on diuretic and ACE-I), sooner if change in clinical condition    Debility  Multifactorial  Continue LTC for 24/7 and ADL care  Supportive care, nutritional support  Fall precautions  Management of chronic medical conditions as outlined     Katherine Stevenson, DO  2/27/24

## 2024-03-11 PROBLEM — M25.541 ARTHRALGIA OF BOTH HANDS: Status: RESOLVED | Noted: 2023-09-29 | Resolved: 2024-03-11

## 2024-03-11 PROBLEM — M25.542 ARTHRALGIA OF BOTH HANDS: Status: RESOLVED | Noted: 2023-09-29 | Resolved: 2024-03-11

## 2024-03-11 PROBLEM — M15.9 GENERALIZED OSTEOARTHRITIS: Status: ACTIVE | Noted: 2024-03-11

## 2024-03-11 NOTE — ASSESSMENT & PLAN NOTE
Goal <140-150/90, avoid hypotension. SBP trending 130s-140s over past month   Continue amlodipine 5mg daily  Continue metoprolol succinate, lasix, lisinopril in setting of CHF  Due for routine BMP in Apr 2024 (to reassess electrolytes and renal function on diuretic and ACE-I), sooner if change in clinical condition

## 2024-03-11 NOTE — ASSESSMENT & PLAN NOTE
Echo from 4/2022 with EF of 70% and grade 1 diastolic dysfunction  Weight stable long term with some variation over past 1-2 months- continue to trend weights, monitor volume status closely  Continue lasix 20mg daily  Continue metoprolol succinate 75mg daily  Continue lisinopril 40mg daily  Monitor metabolic panel Q6 months, sooner if change in clinical status- next routine BMP approx April 2024

## 2024-03-11 NOTE — ASSESSMENT & PLAN NOTE
Continue acetaminophen PRN- infrequent use noted  Continue topical diclofenac for painful wrists/hands- consider trial of change to PRN if infrequent pain complaints

## 2024-03-25 NOTE — PROGRESS NOTES
Learning About Being Active as an Older Adult  Why is being active important as you get older?     Being active is one of the best things you can do for your health. And it's never too late to start. Being active--or getting active, if you aren't already--has definite benefits. It can:  Give you more energy,  Keep your mind sharp.  Improve balance to reduce your risk of falls.  Help you manage chronic illness with fewer medicines.  No matter how old you are, how fit you are, or what health problems you have, there is a form of activity that will work for you. And the more physical activity you can do, the better your overall health will be.  What kinds of activity can help you stay healthy?  Being more active will make your daily activities easier. Physical activity includes planned exercise and things you do in daily life. There are four types of activity:  Aerobic.  Doing aerobic activity makes your heart and lungs strong.  Includes walking, dancing, and gardening.  Aim for at least 2½ hours spread throughout the week.  It improves your energy and can help you sleep better.  Muscle-strengthening.  This type of activity can help maintain muscle and strengthen bones.  Includes climbing stairs, using resistance bands, and lifting or carrying heavy loads.  Aim for at least twice a week.  It can help protect the knees and other joints.  Stretching.  Stretching gives you better range of motion in joints and muscles.  Includes upper arm stretches, calf stretches, and gentle yoga.  Aim for at least twice a week, preferably after your muscles are warmed up from other activities.  It can help you function better in daily life.  Balancing.  This helps you stay coordinated and have good posture.  Includes heel-to-toe walking, maxime chi, and certain types of yoga.  Aim for at least 3 days a week.  It can reduce your risk of falling.  Even if you have a hard time meeting the recommendations, it's better to be more active  [facility]  Progress Note Pepper Axon 7/19/1918, 80 y o  female MRN: [id number]  Unit/Bed#: S -01 Encounter: [id number]  Primary Care Provider: Martine Fonseca MD   Date and time admitted to hospital: 4/21/2022  7:24 PM    * Sepsis [facility])  Assessment & Plan  POA: Sepsis as evidenced by fever 101 7, tachycardia 98, leukocytosis 11 06       Recent Labs     04/21/22 1954 04/22/22  0521   WBC 11 06* 15 47*     Recent Labs     04/21/22 2021 04/21/22 2158 04/22/22  0014   LACTICACID 5 6* 3 3* 1 3       /70   Pulse 84   Temp 98 1 °F (36 7 °C)   Resp 16   Ht 5' 1" (1 549 m)   Wt 60 1 kg (132 lb 7 9 oz)   LMP  (LMP Unknown)   SpO2 95%   BMI 25 03 kg/m²     CT abdomen pelvis with contrast was unremarkable  Ultrasound right upper quadrant shows pneumobilia, normal common bile duct  Chest x-ray unremarkable  Urinalysis unremarkable  Patient had 1/2 E coli on blood cultures  Plan:   Abx:  Ceftriaxone 1 g Q 24 hours   Discontinue IV fluids   Two HIDA scan rule out acute cholecystitis given ultrasound findings   Continue to follow culture sensitivities  Transaminitis  Assessment & Plan  Recent Labs     04/21/22 1954 04/22/22  0728   AST 2,363* 1,410*   ALT 1,507* 1,516*   ALKPHOS 666* 517*   TBILI 1 01* 0 37     Acetaminophen:  Daily use unknown dosage  Acetaminophen level 18 5    Imaging  · CT Abd/Pev:  Unremarkable  · U/S:  Pending  There is a mixed pattern in terms of the transaminitis  Status post acetylcysteine infusion in the emergency department  Etiologies:  Most likely secondary to infarction  Need to rule out viral etiology  Plan:  · Continue to trend LFTs, INR  · GI on board  · Follow Hepatitis panel        Bacteremia  Assessment & Plan  1/2 E coli bacteremia    Continue ceftriaxone  Follow sensitivities    Type 2 diabetes mellitus without complication, without long-term current use of insulin [facility])  Assessment & Plan  Lab Results   Component Value Date    HGBA1C 5 5 2022       Recent Labs     22  0803 22  1110 22  1611   POCGLU 131 98 125       Blood Sugar Average: Last 72 hrs:  · (P) 118   Plan  · Hold Janumet  · Insulin sliding scale  · Hemoglobin A1c  Essential hypertension  Assessment & Plan  · Blood pressure acceptable  · Continue amlodipine 5 mg daily  · Continue verapamil 180 mg daily  · Continue lisinopril 20 mg b i d  Francisco Aquino Acetaminophen toxicity-resolved as of 2022  Assessment & Plan  · Takes daily acetaminophen 4 tablets per day unknown dosage  · Serum acetaminophen level 15 8  Acetaminophen toxicity is unlikely given patient was only taking 2 g of Tylenol per day per GI  Plan  · Received 3 doses of acetylcysteine          VTE Pharmacologic Prophylaxis: VTE Score: 8 High Risk (Score >/= 5) - Pharmacological DVT Prophylaxis Ordered: enoxaparin (Lovenox)  Sequential Compression Devices Ordered  Patient Centered Rounds: I performed bedside rounds with nursing staff today  Discussions with Specialists or Other Care Team Provider:  Gastroenterology    Education and Discussions with Family / Patient: Updated  (daughter) at bedside  Current Length of Stay: 1 day(s)  Current Patient Status: Inpatient   Discharge Plan: Anticipate discharge in 24-48 hrs to home  Code Status: Level 1 - Full Code    Subjective:   Patient is currently comfortable  No more abdominal pain endorsed  Patient has normal urine and stool  No gross bleeding noted  No rashes noted  Patient reports to have good appetite  No other subjective complaints noted    Objective:     Vitals:   Temp (24hrs), Av 2 °F (37 3 °C), Min:98 °F (36 7 °C), Max:101 7 °F (38 7 °C)    Temp:  [98 °F (36 7 °C)-101 7 °F (38 7 °C)] 98 1 °F (36 7 °C)  HR:  [78-98] 84  Resp:  [16-18] 16  BP: (102-158)/(51-73) 148/70  SpO2:  [91 %-97 %] 95 %  Body mass index is 25 03 kg/m²  Input and Output Summary (last 24 hours):      Intake/Output Summary (Last 24 hours) at 4/22/2022 1639  Last data filed at 4/22/2022 0836  Gross per 24 hour   Intake 1550 ml   Output 1450 ml   Net 100 ml       Physical Exam:   Physical Exam  Vitals and nursing note reviewed  Constitutional:       General: She is not in acute distress  Appearance: She is well-developed  Comments: Patient appears euvolemic on physical exam    patient oriented x3   HENT:      Head: Normocephalic and atraumatic  Nose: Nose normal       Mouth/Throat:      Mouth: Mucous membranes are moist    Eyes:      Conjunctiva/sclera: Conjunctivae normal    Cardiovascular:      Rate and Rhythm: Normal rate and regular rhythm  Heart sounds: No murmur heard  Pulmonary:      Effort: Pulmonary effort is normal  No respiratory distress  Breath sounds: Normal breath sounds  Abdominal:      General: There is no distension  Palpations: Abdomen is soft  There is no mass  Tenderness: There is no abdominal tenderness  There is no right CVA tenderness, left CVA tenderness, guarding or rebound  Hernia: No hernia is present  Musculoskeletal:      Cervical back: Neck supple  Right lower leg: No edema  Left lower leg: No edema  Skin:     General: Skin is warm and dry  Capillary Refill: Capillary refill takes less than 2 seconds  Findings: Rash (Noted to have dry skin, ecchymosis on bilateral hands upper extremities  ) present  Neurological:      General: No focal deficit present  Mental Status: She is alert and oriented to person, place, and time     Psychiatric:         Mood and Affect: Mood normal           Additional Data:     Labs:  Results from last 7 days   Lab Units 04/22/22  0522 04/22/22  0521 04/22/22  0521   WBC Thousand/uL  --   --  15 47*   HEMOGLOBIN g/dL  --   --  10 2*   HEMATOCRIT %  --   --  32 5*   PLATELETS Thousands/uL 349   < > 332   NEUTROS PCT %  --   --  87*   LYMPHS PCT %  --   --  5*   MONOS PCT %  --   --  7   EOS PCT % --   --  0    < > = values in this interval not displayed       Results from last 7 days   Lab Units 04/22/22  0728   SODIUM mmol/L 144   POTASSIUM mmol/L 3 5   CHLORIDE mmol/L 108   CO2 mmol/L 24   BUN mg/dL 16   CREATININE mg/dL 0 57*   ANION GAP mmol/L 12   CALCIUM mg/dL 8 1*   ALBUMIN g/dL 2 7*   TOTAL BILIRUBIN mg/dL 0 37   ALK PHOS U/L 517*   ALT U/L 1,516*   AST U/L 1,410*   GLUCOSE RANDOM mg/dL 131     Results from last 7 days   Lab Units 04/22/22  0728   INR  1 27*     Results from last 7 days   Lab Units 04/22/22  1611 04/22/22  1110 04/22/22  0803   POC GLUCOSE mg/dl 125 98 131     Results from last 7 days   Lab Units 04/21/22  1954   HEMOGLOBIN A1C % 5 5     Results from last 7 days   Lab Units 04/22/22  0728 04/22/22  0014 04/21/22  2158 04/21/22 2021   LACTIC ACID mmol/L  --  1 3 3 3* 5 6*   PROCALCITONIN ng/ml 11 76*  --   --   --        Lines/Drains:  Invasive Devices  Report    Peripheral Intravenous Line            Peripheral IV 04/21/22 Left;Dorsal (posterior) Forearm <1 day    Peripheral IV 04/21/22 Right;Lateral Antecubital <1 day                      Imaging: Reviewed radiology reports from this admission including: abdominal/pelvic CT    Recent Cultures (last 7 days):   Results from last 7 days   Lab Units 04/21/22 2021   GRAM STAIN RESULT  Gram negative rods*  Gram negative rods*       Last 24 Hours Medication List:   Current Facility-Administered Medications   Medication Dose Route Frequency Provider Last Rate    acetylcysteine  100 mg/kg Intravenous Once Teresa MD Justo 6,010 mg (04/22/22 5941)    amLODIPine  5 mg Oral Daily Kye Davies DO      cefTRIAXone  1,000 mg Intravenous Q24H Kye Davies DO      enoxaparin  30 mg Subcutaneous Daily Andry Bustos DO      insulin lispro  1-5 Units Subcutaneous TID  Andry Bustos DO      lisinopril  20 mg Oral BID Kye Davies DO      verapamil  180 mg Oral Daily Kye Davies DO          Today, Patient Was Seen By: David Kerr, MD    **Please Note: This note may have been constructed using a voice recognition system  **

## 2024-04-30 ENCOUNTER — NURSING HOME VISIT (OUTPATIENT)
Dept: GERIATRICS | Facility: OTHER | Age: 89
End: 2024-04-30
Payer: MEDICARE

## 2024-04-30 DIAGNOSIS — R53.81 DEBILITY: ICD-10-CM

## 2024-04-30 DIAGNOSIS — D64.9 ANEMIA, UNSPECIFIED TYPE: ICD-10-CM

## 2024-04-30 DIAGNOSIS — E11.9 TYPE 2 DIABETES MELLITUS WITHOUT COMPLICATION, WITHOUT LONG-TERM CURRENT USE OF INSULIN (HCC): ICD-10-CM

## 2024-04-30 DIAGNOSIS — I50.32 CHRONIC DIASTOLIC CHF (CONGESTIVE HEART FAILURE) (HCC): Primary | ICD-10-CM

## 2024-04-30 PROCEDURE — 99309 SBSQ NF CARE MODERATE MDM 30: CPT | Performed by: FAMILY MEDICINE

## 2024-04-30 NOTE — PROGRESS NOTES
[facility]  Progress Note  POS: Nursing Facility/LT-32    Chief Complaint/Reason for visit: Follow up of chronic medical conditions  History of Present Illness: 89 year old female evaluated for routine follow up of chronic medical conditions. Weight loss of 5.8lb from March to April, will trend next weight in early May. Wound team continues to follow for LLE abrasion.        Review of systems: Review of Systems   Constitutional:  Positive for appetite change and unexpected weight change. Negative for activity change.   Skin:  Positive for wound.      Medications: No changes made  Consults reviewed: Wound Care    Physical Exam    Weight: 138lb Temp:97.9F BP:161/68 Pulse:84 Resp:18 O2 Sat:93%RA    Physical Exam  Vitals and nursing note reviewed.   Constitutional:       General: She is sleeping. She is not in acute distress.     Appearance: She is well-groomed. She is not toxic-appearing or diaphoretic.   HENT:      Head: Normocephalic.      Nose: No rhinorrhea.      Mouth/Throat:      Mouth: Mucous membranes are moist.   Eyes:      General:         Right eye: No discharge.         Left eye: No discharge.   Pulmonary:      Effort: Pulmonary effort is normal. No respiratory distress.   Skin:     Coloration: Skin is not jaundiced or pale.   Neurological:      Mental Status: She is easily aroused. Mental status is at baseline.   Psychiatric:         Behavior: Behavior is cooperative.       Assessment/Plan:  89 year old female with:    Chronic diastolic CHF (congestive heart failure) ([facility])  Echo from 4/2022 with EF of 70% and grade 1 diastolic dysfunction  Weight loss over past month noted, decline in PO intake reported  Continue lasix 20mg daily  Continue metoprolol succinate 75mg daily  Continue lisinopril 40mg daily  BMP ordered    Anemia  Hgb 11.7  most recent labs with mild leukocytosis  CBC ordered    Type 2 diabetes mellitus without complication, without long-term  current use of insulin ([facility])  A1c 6.6 on 5/26/23; diet controlled  Goal A1c <8.5-9 given age and comorbidities. Given advanced age, unlikely would restart oral hypoglycemic agent unless drastic increase in A1c or clinical symptoms- recent weight loss noted so will check labs as below  A1c and BMP ordered    Debility  Multifactorial  Continue LTC for 24/7 and ADL care  Supportive care, nutritional support  Fall precautions  Management of chronic medical conditions as outlined     Katherine Stevenson,   4/30/24

## 2024-04-30 NOTE — ASSESSMENT & PLAN NOTE
Echo from 4/2022 with EF of 70% and grade 1 diastolic dysfunction  Weight loss over past month noted, decline in PO intake reported  Continue lasix 20mg daily  Continue metoprolol succinate 75mg daily  Continue lisinopril 40mg daily  BMP ordered

## 2024-04-30 NOTE — ASSESSMENT & PLAN NOTE
A1c 6.6 on 5/26/23; diet controlled  Goal A1c <8.5-9 given age and comorbidities. Given advanced age, unlikely would restart oral hypoglycemic agent unless drastic increase in A1c or clinical symptoms- recent weight loss noted so will check labs as below  A1c and BMP ordered

## 2024-06-03 ENCOUNTER — NURSING HOME VISIT (OUTPATIENT)
Dept: GERIATRICS | Facility: OTHER | Age: 89
End: 2024-06-03
Payer: MEDICARE

## 2024-06-03 DIAGNOSIS — E87.1 HYPONATREMIA: ICD-10-CM

## 2024-06-03 DIAGNOSIS — J40 BRONCHITIS: Primary | ICD-10-CM

## 2024-06-03 PROCEDURE — 99309 SBSQ NF CARE MODERATE MDM 30: CPT | Performed by: NURSE PRACTITIONER

## 2024-06-03 NOTE — PROGRESS NOTES
[facility] at [address]  [place]  [address]  [phone]    Acute Visit Note  LTC: POS 32    ASSESSMENT AND PLAN:  1. Bronchitis  Assessment & Plan:  Chest x-ray reviewed, negative for acute cardio-pulmonary abnormality.  No fever reported. WBC normal on CBC today.  Increased wheezing, cough reported  Will start Augmentin and Medrol dose pack for suspected acute bacterial bronchitis.  Neb treatments ordered.   Will continue to monitor blood work and vital signs  Continue supportive care and ensure adequate hydration.     2. Hyponatremia  Assessment & Plan:  Sodium level currently 134   Suspect poor po fluid intake  Encourage PO fluid intake  Will monitor BMP      Name: Yesica Pierce                 : 1918               Sex: female    HPI:    89-year-old female patient seen and examined today for increased wheezing, cough and shortness of breath. She had a chest x-ray performed which is negative for cardio-pulmonary abnormalities COVID testing reported to be negative. She is currently in no acute distress on oxygen nasal cannula.    The following portions of the patient's history were reviewed and updated as appropriate: allergies, current medications, past family history, past medical history, past social history, past surgical history and problem list.    ROS: Review of Systems   Constitutional:  Negative for chills and fever.   HENT:  Positive for congestion.    Respiratory:  Positive for cough, shortness of breath and wheezing.    Cardiovascular:  Negative for chest pain.       No Known Allergies    Medications:    Current Outpatient Medications on File Prior to Visit   Medication Sig Dispense Refill    amLODIPine (NORVASC) 5 mg tablet Take 5 mg by mouth daily      Diclofenac Sodium (VOLTAREN) 1 % Apply 2 g topically 2 (two) times a day Bilateral wrist and finger joints.      furosemide (LASIX) 20 mg tablet Take 20 mg by mouth daily      lisinopril  (ZESTRIL) 40 mg tablet Take 1 tablet (40 mg total) by mouth daily      metoprolol succinate (TOPROL-XL) 25 mg 24 hr tablet Take 3 tablets (75 mg total) by mouth daily       No current facility-administered medications on file prior to visit.       History:  Past Medical History:   Diagnosis Date    Diabetes mellitus (HCC)     Hypertension     Macular degeneration      No past surgical history on file.  No family history on file.  Social History     Socioeconomic History    Marital status:      Spouse name: Not on file    Number of children: Not on file    Years of education: Not on file    Highest education level: Not on file   Occupational History    Not on file   Tobacco Use    Smoking status: Never    Smokeless tobacco: Never   Substance and Sexual Activity    Alcohol use: No    Drug use: No    Sexual activity: Not on file   Other Topics Concern    Not on file   Social History Narrative    Not on file     Social Determinants of Health     Financial Resource Strain: Not on file   Food Insecurity: No Food Insecurity (4/22/2022)    Hunger Vital Sign     Worried About Running Out of Food in the Last Year: Never true     Ran Out of Food in the Last Year: Never true   Transportation Needs: No Transportation Needs (4/22/2022)    PRAPARE - Transportation     Lack of Transportation (Medical): No     Lack of Transportation (Non-Medical): No   Physical Activity: Not on file   Stress: Not on file   Social Connections: Not on file   Intimate Partner Violence: Not on file   Housing Stability: Unknown (4/22/2022)    Housing Stability Vital Sign     Unable to Pay for Housing in the Last Year: No     Number of Places Lived in the Last Year: Not on file     Unstable Housing in the Last Year: No     No past surgical history on file.    OBJECTIVE:  Vital Signs:  /52, Temp 97.8, RR 20, , SpO2 97% O2 N/C, Wgt: 136.2 lbs   Physical Exam  Constitutional:       General: She is not in acute distress.     Appearance: She  is ill-appearing. She is not toxic-appearing or diaphoretic.   HENT:      Head: Normocephalic and atraumatic.      Right Ear: External ear normal.      Left Ear: External ear normal.      Nose: Nose normal.      Mouth/Throat:      Mouth: Mucous membranes are moist.   Cardiovascular:      Rate and Rhythm: Regular rhythm. Tachycardia present.   Pulmonary:      Effort: Pulmonary effort is normal. No respiratory distress.      Breath sounds: Wheezing present. No rhonchi or rales.   Abdominal:      General: Bowel sounds are normal. There is no distension.      Palpations: Abdomen is soft. There is no mass.      Tenderness: There is no abdominal tenderness. There is no guarding.   Musculoskeletal:         General: Normal range of motion.   Skin:     General: Skin is warm and dry.   Neurological:      Mental Status: She is alert. Mental status is at baseline.   Psychiatric:         Mood and Affect: Mood normal.         Behavior: Behavior normal.         Labs & Imaging Reviewed in facility EMR    JACQUES Ibarra  Geriatric Medicine  06/03/2024

## 2024-06-12 ENCOUNTER — NURSING HOME VISIT (OUTPATIENT)
Dept: GERIATRICS | Facility: OTHER | Age: 89
End: 2024-06-12
Payer: MEDICARE

## 2024-06-12 DIAGNOSIS — I50.32 CHRONIC DIASTOLIC CHF (CONGESTIVE HEART FAILURE) (HCC): ICD-10-CM

## 2024-06-12 DIAGNOSIS — E87.1 HYPONATREMIA: ICD-10-CM

## 2024-06-12 DIAGNOSIS — J96.01 ACUTE RESPIRATORY FAILURE WITH HYPOXIA (HCC): Primary | ICD-10-CM

## 2024-06-12 DIAGNOSIS — I15.0 RENOVASCULAR HYPERTENSION: ICD-10-CM

## 2024-06-12 PROCEDURE — 99309 SBSQ NF CARE MODERATE MDM 30: CPT | Performed by: FAMILY MEDICINE

## 2024-06-12 NOTE — PROGRESS NOTES
[facility]  Progress Note- Acute visit  POS: Nursing Facility/LTC-32    Chief Complaint/Reason for visit: Hypoxia  History of Present Illness: 89 year old female evaluated for acute visit with reported respiratory difficulties and increased oxygen requirements.       Review of systems: Review of Systems   Constitutional:  Positive for appetite change and fatigue. Negative for fever and unexpected weight change.   Respiratory:  Positive for wheezing. Negative for cough, chest tightness and shortness of breath.    Cardiovascular:  Positive for leg swelling. Negative for chest pain and palpitations.   Psychiatric/Behavioral:  Positive for confusion.       Medications: Changes made- see written orders  Labs/Diagnostics (reviewed by this provider): Copy in Chart  CBC, BMP (6/12/24)   Hgb 9.4 WBC 14.1 Plt 480  Na 132 K 4.1 BUN 14 Creat 0.67 Glc 175    Physical Exam    Vitals reviewed in [facility] EMR    Physical Exam  Vitals and nursing note reviewed.   Constitutional:       General: She is sleeping. She is not in acute distress.     Appearance: She is ill-appearing. She is not toxic-appearing or diaphoretic.      Interventions: Nasal cannula in place.   HENT:      Head: Normocephalic and atraumatic.      Nose: No rhinorrhea.   Eyes:      General: No scleral icterus.        Right eye: No discharge.         Left eye: No discharge.   Cardiovascular:      Rate and Rhythm: Normal rate.   Pulmonary:      Effort: Pulmonary effort is normal. No respiratory distress.      Breath sounds: Wheezing present.   Musculoskeletal:      Right lower leg: Edema present.      Left lower leg: Edema present.   Skin:     Coloration: Skin is pale. Skin is not jaundiced.   Neurological:      Mental Status: She is easily aroused.      Cranial Nerves: No facial asymmetry.       Assessment/Plan:  89 year old female with:    Acute respiratory failure with hypoxia (HCC)  In setting of recent bronchitis s/p  antibiotics, nebs and steroids  With evidence of volume overload on exam, will give additional dose of lasix 20mg one time on 6/12/24  Stat CXR ordered  Consider anemia as contributing factor- repeat CBC, BMP ordered for 6/14  Consider atelectasis in setting of debility as contributing factor  Monitor vitals Qshift x72h    Chronic diastolic CHF (congestive heart failure) (HCC)  Echo from 4/2022 with EF of 70% and grade 1 diastolic dysfunction  Continue lasix 20mg daily- additional 20mg to be given one time on 6/12  Continue metoprolol succinate 75mg daily  If SBP consistently <120, will discontinue amlodipine and lisinopril    Hypertension  See above  Goal <140/90    Hyponatremia  Mild volume overload on exam- extra dose of lasix 20mg on 6/12  BMP ordered for 6/14     Katherine Stevenson, DO  6/12/24

## 2024-06-15 ENCOUNTER — TELEPHONE (OUTPATIENT)
Dept: OTHER | Facility: OTHER | Age: 89
End: 2024-06-15

## 2024-06-16 ENCOUNTER — TELEPHONE (OUTPATIENT)
Dept: OTHER | Facility: OTHER | Age: 89
End: 2024-06-16

## 2024-06-17 ENCOUNTER — NURSING HOME VISIT (OUTPATIENT)
Dept: GERIATRICS | Facility: OTHER | Age: 89
End: 2024-06-17
Payer: MEDICARE

## 2024-06-17 DIAGNOSIS — N30.00 ACUTE CYSTITIS WITHOUT HEMATURIA: Primary | ICD-10-CM

## 2024-06-17 DIAGNOSIS — E87.1 HYPONATREMIA: ICD-10-CM

## 2024-06-17 PROCEDURE — 99309 SBSQ NF CARE MODERATE MDM 30: CPT | Performed by: NURSE PRACTITIONER

## 2024-06-17 NOTE — PROGRESS NOTES
[address] [address]  [phone]  [facility]   Progress Note        NAME: Yesica Pierce  AGE: 89 y.o. SEX: female  :  1918  DATE OF ENCOUNTER: 2024    Chief Complaint   Patient seen and examined for     History of Present Illness     ***    The following portions of the patient's history were reviewed and updated as appropriate: allergies, current medications, past family history, past medical history, past social history, past surgical history and problem list.    Review of Systems     A review of systems was performed. All negative, except as per HPI.    History     Past Medical History:   Diagnosis Date    Diabetes mellitus (HCC)     Hypertension     Macular degeneration      No past surgical history on file.  No family history on file.  Social History     Socioeconomic History    Marital status:      Spouse name: Not on file    Number of children: Not on file    Years of education: Not on file    Highest education level: Not on file   Occupational History    Not on file   Tobacco Use    Smoking status: Never    Smokeless tobacco: Never   Substance and Sexual Activity    Alcohol use: No    Drug use: No    Sexual activity: Not on file   Other Topics Concern    Not on file   Social History Narrative    Not on file     Social Determinants of Health     Financial Resource Strain: Not on file   Food Insecurity: No Food Insecurity (2022)    Hunger Vital Sign     Worried About Running Out of Food in the Last Year: Never true     Ran Out of Food in the Last Year: Never true   Transportation Needs: No Transportation Needs (2022)    PRAPARE - Transportation     Lack of Transportation (Medical): No     Lack of Transportation (Non-Medical): No   Physical Activity: Not on file   Stress: Not on file   Social Connections: Not on file   Intimate Partner Violence: Not on file   Housing Stability: Unknown (2022)    Housing Stability Vital Sign      Unable to Pay for Housing in the Last Year: No     Number of Places Lived in the Last Year: Not on file     Unstable Housing in the Last Year: No     No Known Allergies    Objective     Vital Signs  BP: ***       HR:*** T:***    RR:*** O2Sat:*** W:***  General: NAD, Well Nourished, Well Developed  Oral: Oropharynx Moist and Clear  Neck: Supple, +ROM  CV: S1, S2, normal rate, regular rhythm, no murmur appreciated  Pulmonary: Lung sounds clear to air, no wheezing, rhonchi, rales  Abdominal:BS + x4 in all quadrants, soft, no mass, no tenderness  Extremities: No edema, +ROM, +Strength  Skin: Warm, Dry, no lesions, no rash, no erythema present, no ecchymosis present  Neurological: CN 2-12 intact, PERRLA  Psych: Alert and oriented times 3, no mood, no affect, good judgement    Pertinent Laboratory/Diagnostic Studies:  ***      Current Medications     Current Medications Reviewed and updated in Nursing Home EMR.    Assessment and Plan     No problem-specific Assessment & Plan notes found for this encounter.      Katey HANNA  Geriatric Medicine  2024       Name: Yesica Pierce  : 1918  MRN: [id number]  DOS: 2024  Billing Code: [id number]***  Diagnoses:  No diagnosis found.

## 2024-06-20 ENCOUNTER — NURSING HOME VISIT (OUTPATIENT)
Dept: GERIATRICS | Facility: OTHER | Age: 89
End: 2024-06-20
Payer: MEDICARE

## 2024-06-20 DIAGNOSIS — I50.32 CHRONIC DIASTOLIC CHF (CONGESTIVE HEART FAILURE) (HCC): ICD-10-CM

## 2024-06-20 DIAGNOSIS — Z71.89 GOALS OF CARE, COUNSELING/DISCUSSION: ICD-10-CM

## 2024-06-20 DIAGNOSIS — J96.01 ACUTE RESPIRATORY FAILURE WITH HYPOXIA (HCC): ICD-10-CM

## 2024-06-20 DIAGNOSIS — D64.9 ANEMIA, UNSPECIFIED TYPE: ICD-10-CM

## 2024-06-20 DIAGNOSIS — R62.7 FAILURE TO THRIVE IN ADULT: ICD-10-CM

## 2024-06-20 DIAGNOSIS — E87.1 HYPONATREMIA: Primary | ICD-10-CM

## 2024-06-20 DIAGNOSIS — G93.40 ENCEPHALOPATHY: ICD-10-CM

## 2024-06-20 PROCEDURE — 99310 SBSQ NF CARE HIGH MDM 45: CPT | Performed by: FAMILY MEDICINE

## 2024-06-20 NOTE — ASSESSMENT & PLAN NOTE
Echo from 4/2022 with EF of 70% and grade 1 diastolic dysfunction  Continue lasix 20mg daily; monitor volume status closely in setting of poor PO intake  Continue metoprolol succinate 75mg daily  If SBP consistently >120, will discontinue lisinopril

## 2024-06-20 NOTE — PROGRESS NOTES
[facility]  Progress Note- Acute Visit  POS: Nursing Facility/LTC-32    Chief Complaint/Reason for visit: Decreased PO intake, medication refusal  History of Present Illness: 89 year old female evaluated for acute visit with reported progressive decline per nursing. Refusal of PO intake and pills this morning, overall decreased PO intake over the past few days. Patient denied pain or discomfort at time of my visit this morning; no nonverbal signs of pain noted. No fevers. Spoke with daughter, Allyn, via telephone along with nursing and reviewed recent clinical course, labs, interventions and overall decline. Family in agreement with comfort measures; orders updated to do not hospitalize, patient is a DNR. Hospice consult placed.       Review of systems: Review of Systems   Constitutional:  Positive for activity change and appetite change. Negative for fever.   Respiratory:  Negative for shortness of breath.    Psychiatric/Behavioral:  Positive for confusion.       Medications: Changes made- see written orders  Labs/Diagnostics (reviewed by this provider): Copy in Chart  CBC, CMP (6/18/24)   Hgb 8.7 WBC 12.3 Plt 497  Na 132 K 4.3 BUN 13 Creat 0.59  Urine Cx (6/16/24) >10,000 Pseudomonas aeruginosa    Imaging Reviewed:  CXR (6/12/24) no focal consolidation or pleural effusion    Physical Exam    Temp:98F BP:130/88 Pulse:91 Resp:18 O2 Sat:94% NC    Physical Exam  Vitals and nursing note reviewed.   Constitutional:       General: She is sleeping. She is not in acute distress.     Appearance: She is ill-appearing. She is not toxic-appearing or diaphoretic.      Interventions: Nasal cannula in place.      Comments: Patient refused exam of heart and lungs with stethoscope    HENT:      Head: Normocephalic and atraumatic.      Nose: No rhinorrhea.      Mouth/Throat:      Mouth: Mucous membranes are dry.   Eyes:      General: No scleral icterus.        Right eye: No discharge.          Left eye: No discharge.   Pulmonary:      Effort: Pulmonary effort is normal. No respiratory distress.   Musculoskeletal:      Cervical back: No rigidity.   Skin:     Coloration: Skin is pale. Skin is not jaundiced.   Neurological:      Mental Status: She is easily aroused.      Cranial Nerves: No facial asymmetry.       Assessment/Plan:  89 year old female with:    Hyponatremia  In setting of poor PO intake  Examines euvolemic to dry    Anemia  Hgb with recent downtrend in setting of marginal PO intake  Cannot exclude underlying occult bleed, is not on anticoagulation or antiplatelet medications    Chronic diastolic CHF (congestive heart failure) (HCC)  Echo from 4/2022 with EF of 70% and grade 1 diastolic dysfunction  Continue lasix 20mg daily; monitor volume status closely in setting of poor PO intake  Continue metoprolol succinate 75mg daily  If SBP consistently >120, will discontinue lisinopril    Acute respiratory failure with hypoxia (HCC)  In setting of recent bronchitis s/p antibiotics, nebs and steroids  Recent CXR unremarkable for acute pathology  Consider anemia as contributing factor  Consider atelectasis in setting of debility as contributing factor    Failure to thrive in adult  In setting of acute on chronic medical illness  With weight loss of 6.5lb over 3 months, 11.5 lb over 6 months  With refusal of PO intake including food, liquid and medications    Encephalopathy  Multifactorial in setting of above  Waxing and waning    Goals of care, counseling/discussion  See discussion as per HPI  Code status confirmed DNR  Hospitalization status updated to [facility]  Consult placed to [facility]     Katherine Stevenson, DO  6/20/24

## 2024-06-20 NOTE — ASSESSMENT & PLAN NOTE
In setting of acute on chronic medical illness  With weight loss of 6.5lb over 3 months, 11.5 lb over 6 months  With refusal of PO intake including food, liquid and medications

## 2024-06-20 NOTE — ASSESSMENT & PLAN NOTE
Hgb with recent downtrend in setting of marginal PO intake  Cannot exclude underlying occult bleed, is not on anticoagulation or antiplatelet medications

## 2024-06-20 NOTE — ASSESSMENT & PLAN NOTE
See discussion as per HPI  Code status confirmed DNR  Hospitalization status updated to [facility]  Consult placed to [facility]

## 2024-06-20 NOTE — ASSESSMENT & PLAN NOTE
In setting of recent bronchitis s/p antibiotics, nebs and steroids  Recent CXR unremarkable for acute pathology  Consider anemia as contributing factor  Consider atelectasis in setting of debility as contributing factor

## 2024-06-23 PROBLEM — N30.00 ACUTE CYSTITIS WITHOUT HEMATURIA: Status: ACTIVE | Noted: 2024-06-17

## 2024-06-23 PROBLEM — J40 BRONCHITIS: Status: ACTIVE | Noted: 2024-06-03

## 2024-06-23 NOTE — ASSESSMENT & PLAN NOTE
In the setting of poor po fluid intake and infection.  Sodium level decreased to 131  Encourage adequate PO fluid intake  Will continue to monitor BMP

## 2024-06-23 NOTE — ASSESSMENT & PLAN NOTE
Mild urinary tract infection; only >10,000 Pseudomonas Aeruginosa  Cephalexin 500 mg po every 12 hours ordered for 7 days  Encourage adequate PO intake.

## 2024-06-23 NOTE — PROGRESS NOTES
[facility] at [address]  [place]  [address]  [phone]    Acute Visit Note  LTC: POS 32    ASSESSMENT AND PLAN:  1. Acute cystitis without hematuria  Assessment & Plan:  Mild urinary tract infection; only >10,000 Pseudomonas Aeruginosa  Cephalexin 500 mg po every 12 hours ordered for 7 days  Encourage adequate PO intake.  2. Hyponatremia  Assessment & Plan:  In the setting of poor po fluid intake and infection.  Sodium level decreased to 131  Encourage adequate PO fluid intake  Will continue to monitor BMP      Name: Yesica Pierce                 : 1918               Sex: female    HPI:    89-year-old female evaluated today in long term care at [facility] per nursing request for change in mental status. She has been declining for the past several weeks. She is status post antibiotic treatment and medrol dose nabil for acute bronchitis. Over this past weekend, she experienced increased confusion, was found to be yelling out frequently and her urine had a foul odor.  A urine specimen was ordered by the on call provider and shows positive nitrites and >10,000 Pseudomonas Aeruginosa    The following portions of the patient's history were reviewed and updated as appropriate: allergies, current medications, past family history, past medical history, past social history, past surgical history and problem list.    ROS: Review of Systems   Constitutional:  Positive for activity change, appetite change and fatigue. Negative for diaphoresis and fever.   Respiratory:  Negative for chest tightness and shortness of breath.    Cardiovascular:  Negative for chest pain.   Gastrointestinal:  Negative for abdominal pain.   Psychiatric/Behavioral:  Positive for confusion.        No Known Allergies    Medications:    Current Outpatient Medications on File Prior to Visit   Medication Sig Dispense Refill    amLODIPine (NORVASC) 5 mg tablet Take 5 mg by mouth daily      Diclofenac  Sodium (VOLTAREN) 1 % Apply 2 g topically 2 (two) times a day Bilateral wrist and finger joints.      furosemide (LASIX) 20 mg tablet Take 20 mg by mouth daily      lisinopril (ZESTRIL) 40 mg tablet Take 1 tablet (40 mg total) by mouth daily      metoprolol succinate (TOPROL-XL) 25 mg 24 hr tablet Take 3 tablets (75 mg total) by mouth daily       No current facility-administered medications on file prior to visit.       History:  Past Medical History:   Diagnosis Date    Diabetes mellitus (HCC)     Hypertension     Macular degeneration      No past surgical history on file.  No family history on file.  Social History     Socioeconomic History    Marital status:      Spouse name: Not on file    Number of children: Not on file    Years of education: Not on file    Highest education level: Not on file   Occupational History    Not on file   Tobacco Use    Smoking status: Never    Smokeless tobacco: Never   Substance and Sexual Activity    Alcohol use: No    Drug use: No    Sexual activity: Not on file   Other Topics Concern    Not on file   Social History Narrative    Not on file     Social Determinants of Health     Financial Resource Strain: Not on file   Food Insecurity: No Food Insecurity (4/22/2022)    Hunger Vital Sign     Worried About Running Out of Food in the Last Year: Never true     Ran Out of Food in the Last Year: Never true   Transportation Needs: No Transportation Needs (4/22/2022)    PRAPARE - Transportation     Lack of Transportation (Medical): No     Lack of Transportation (Non-Medical): No   Physical Activity: Not on file   Stress: Not on file   Social Connections: Not on file   Intimate Partner Violence: Not on file   Housing Stability: Unknown (4/22/2022)    Housing Stability Vital Sign     Unable to Pay for Housing in the Last Year: No     Number of Places Lived in the Last Year: Not on file     Unstable Housing in the Last Year: No     No past surgical history on  file.    OBJECTIVE:  Vital Signs:  /52, Temp 97.8, HR 76, RR 20, SpO2   Physical Exam  Constitutional:       Appearance: She is ill-appearing.   HENT:      Head: Normocephalic and atraumatic.      Right Ear: External ear normal.      Left Ear: External ear normal.      Nose: Nose normal.      Mouth/Throat:      Mouth: Mucous membranes are moist.   Eyes:      Extraocular Movements: Extraocular movements intact.      Conjunctiva/sclera: Conjunctivae normal.   Pulmonary:      Effort: Pulmonary effort is normal. No respiratory distress.   Musculoskeletal:         General: Normal range of motion.   Skin:     General: Skin is warm and dry.   Neurological:      Mental Status: She is disoriented.   Psychiatric:         Mood and Affect: Mood normal.         Behavior: Behavior normal.         Labs & Imaging Reviewed in facility EMR.    JACQUES Ibarra  Geriatric Medicine  06/17/2024

## 2024-06-23 NOTE — ASSESSMENT & PLAN NOTE
Chest x-ray reviewed, negative for acute cardio-pulmonary abnormality.  No fever reported. WBC normal on CBC today.  Increased wheezing, cough reported  Will start Augmentin and Medrol dose pack for suspected acute bacterial bronchitis.  Neb treatments ordered.   Will continue to monitor blood work and vital signs  Continue supportive care and ensure adequate hydration.

## 2024-06-23 NOTE — ASSESSMENT & PLAN NOTE
Sodium level currently 134   Suspect poor po fluid intake  Encourage PO fluid intake  Will monitor BMP

## 2024-06-25 NOTE — ASSESSMENT & PLAN NOTE
Echo from 4/2022 with EF of 70% and grade 1 diastolic dysfunction  Continue lasix 20mg daily- additional 20mg to be given one time on 6/12  Continue metoprolol succinate 75mg daily  If SBP consistently <120, will discontinue amlodipine and lisinopril

## 2024-06-25 NOTE — ASSESSMENT & PLAN NOTE
In setting of recent bronchitis s/p antibiotics, nebs and steroids  With evidence of volume overload on exam, will give additional dose of lasix 20mg one time on 6/12/24  Stat CXR ordered  Consider anemia as contributing factor- repeat CBC, BMP ordered for 6/14  Consider atelectasis in setting of debility as contributing factor  Monitor vitals Qshift x72h

## 2024-07-23 PROBLEM — N30.00 ACUTE CYSTITIS WITHOUT HEMATURIA: Status: RESOLVED | Noted: 2024-06-17 | Resolved: 2024-07-23

## 2024-07-28 ENCOUNTER — TELEPHONE (OUTPATIENT)
Dept: OTHER | Facility: OTHER | Age: 89
End: 2024-07-28

## 2024-07-28 NOTE — TELEPHONE ENCOUNTER
"[facility] stated, \"The pt has had a change in condition.\"    On call notified via secure chat.  "

## 2025-05-02 NOTE — ASSESSMENT & PLAN NOTE
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